# Patient Record
Sex: FEMALE | Race: WHITE | NOT HISPANIC OR LATINO | Employment: PART TIME | URBAN - METROPOLITAN AREA
[De-identification: names, ages, dates, MRNs, and addresses within clinical notes are randomized per-mention and may not be internally consistent; named-entity substitution may affect disease eponyms.]

---

## 2023-03-20 LAB
ALANINE AMINOTRANSFERASE (SGPT) (U/L) IN SER/PLAS: 26 U/L (ref 7–45)
ALBUMIN (G/DL) IN SER/PLAS: 4.4 G/DL (ref 3.4–5)
ALKALINE PHOSPHATASE (U/L) IN SER/PLAS: 91 U/L (ref 33–136)
ANION GAP IN SER/PLAS: 16 MMOL/L (ref 10–20)
ASPARTATE AMINOTRANSFERASE (SGOT) (U/L) IN SER/PLAS: 22 U/L (ref 9–39)
BILIRUBIN TOTAL (MG/DL) IN SER/PLAS: 0.5 MG/DL (ref 0–1.2)
CALCIUM (MG/DL) IN SER/PLAS: 9.7 MG/DL (ref 8.6–10.6)
CARBON DIOXIDE, TOTAL (MMOL/L) IN SER/PLAS: 26 MMOL/L (ref 21–32)
CHLORIDE (MMOL/L) IN SER/PLAS: 101 MMOL/L (ref 98–107)
CREATININE (MG/DL) IN SER/PLAS: 0.89 MG/DL (ref 0.5–1.05)
ERYTHROCYTE DISTRIBUTION WIDTH (RATIO) BY AUTOMATED COUNT: 12.9 % (ref 11.5–14.5)
ERYTHROCYTE MEAN CORPUSCULAR HEMOGLOBIN CONCENTRATION (G/DL) BY AUTOMATED: 31.6 G/DL (ref 32–36)
ERYTHROCYTE MEAN CORPUSCULAR VOLUME (FL) BY AUTOMATED COUNT: 92 FL (ref 80–100)
ERYTHROCYTES (10*6/UL) IN BLOOD BY AUTOMATED COUNT: 4.6 X10E12/L (ref 4–5.2)
GFR FEMALE: 66 ML/MIN/1.73M2
GLUCOSE (MG/DL) IN SER/PLAS: 100 MG/DL (ref 74–99)
HEMATOCRIT (%) IN BLOOD BY AUTOMATED COUNT: 42.1 % (ref 36–46)
HEMOGLOBIN (G/DL) IN BLOOD: 13.3 G/DL (ref 12–16)
LEUKOCYTES (10*3/UL) IN BLOOD BY AUTOMATED COUNT: 7.3 X10E9/L (ref 4.4–11.3)
NRBC (PER 100 WBCS) BY AUTOMATED COUNT: 0 /100 WBC (ref 0–0)
PLATELETS (10*3/UL) IN BLOOD AUTOMATED COUNT: 269 X10E9/L (ref 150–450)
POTASSIUM (MMOL/L) IN SER/PLAS: 4.5 MMOL/L (ref 3.5–5.3)
PROTEIN TOTAL: 7.3 G/DL (ref 6.4–8.2)
SODIUM (MMOL/L) IN SER/PLAS: 138 MMOL/L (ref 136–145)
THYROTROPIN (MIU/L) IN SER/PLAS BY DETECTION LIMIT <= 0.05 MIU/L: 1.95 MIU/L (ref 0.44–3.98)
UREA NITROGEN (MG/DL) IN SER/PLAS: 31 MG/DL (ref 6–23)

## 2023-06-05 LAB
CREATININE (MG/DL) IN SER/PLAS: 1.15 MG/DL (ref 0.5–1.05)
GFR FEMALE: 49 ML/MIN/1.73M2

## 2023-10-27 DIAGNOSIS — M79.7 FIBROMYALGIA: ICD-10-CM

## 2023-10-31 RX ORDER — DULOXETIN HYDROCHLORIDE 60 MG/1
60 CAPSULE, DELAYED RELEASE ORAL DAILY
Qty: 90 CAPSULE | Refills: 0 | Status: SHIPPED | OUTPATIENT
Start: 2023-10-31 | End: 2024-04-05 | Stop reason: SDUPTHER

## 2024-03-27 ENCOUNTER — OFFICE VISIT (OUTPATIENT)
Dept: PRIMARY CARE | Facility: CLINIC | Age: 79
End: 2024-03-27
Payer: MEDICARE

## 2024-03-27 VITALS
HEART RATE: 87 BPM | DIASTOLIC BLOOD PRESSURE: 70 MMHG | BODY MASS INDEX: 40.65 KG/M2 | SYSTOLIC BLOOD PRESSURE: 140 MMHG | HEIGHT: 65 IN | WEIGHT: 244 LBS

## 2024-03-27 DIAGNOSIS — I87.2 STASIS DERMATITIS OF BOTH LEGS: ICD-10-CM

## 2024-03-27 DIAGNOSIS — R01.1 MURMUR: ICD-10-CM

## 2024-03-27 DIAGNOSIS — E03.9 ACQUIRED HYPOTHYROIDISM: ICD-10-CM

## 2024-03-27 DIAGNOSIS — Z00.00 MEDICARE ANNUAL WELLNESS VISIT, SUBSEQUENT: Primary | ICD-10-CM

## 2024-03-27 DIAGNOSIS — M79.7 FIBROMYALGIA: ICD-10-CM

## 2024-03-27 DIAGNOSIS — I89.0 LYMPHEDEMA: ICD-10-CM

## 2024-03-27 DIAGNOSIS — Z78.0 ASYMPTOMATIC POSTMENOPAUSAL STATE: ICD-10-CM

## 2024-03-27 DIAGNOSIS — R79.9 ABNORMAL FINDING OF BLOOD CHEMISTRY, UNSPECIFIED: ICD-10-CM

## 2024-03-27 DIAGNOSIS — Z11.59 NEED FOR HEPATITIS C SCREENING TEST: ICD-10-CM

## 2024-03-27 DIAGNOSIS — I10 BENIGN ESSENTIAL HTN: ICD-10-CM

## 2024-03-27 DIAGNOSIS — E55.9 VITAMIN D DEFICIENCY: ICD-10-CM

## 2024-03-27 DIAGNOSIS — E78.49 OTHER HYPERLIPIDEMIA: ICD-10-CM

## 2024-03-27 DIAGNOSIS — E66.01 MORBID OBESITY (MULTI): ICD-10-CM

## 2024-03-27 PROBLEM — R69 DISORDER: Status: RESOLVED | Noted: 2024-03-27 | Resolved: 2024-03-27

## 2024-03-27 PROBLEM — L98.9 SKIN LESION: Status: ACTIVE | Noted: 2024-03-27

## 2024-03-27 PROBLEM — I50.89 OTHER HEART FAILURE (MULTI): Status: ACTIVE | Noted: 2024-03-27

## 2024-03-27 PROBLEM — H60.509 ACUTE OTITIS EXTERNA: Status: RESOLVED | Noted: 2024-03-27 | Resolved: 2024-03-27

## 2024-03-27 PROBLEM — N64.4 BREAST PAIN: Status: RESOLVED | Noted: 2024-03-27 | Resolved: 2024-03-27

## 2024-03-27 PROBLEM — H04.123 DRY EYE SYNDROME OF BOTH EYES: Status: ACTIVE | Noted: 2023-06-27

## 2024-03-27 PROBLEM — R05.9 COUGH: Status: RESOLVED | Noted: 2024-03-27 | Resolved: 2024-03-27

## 2024-03-27 PROBLEM — B02.9 HERPES ZOSTER: Status: ACTIVE | Noted: 2024-03-27

## 2024-03-27 PROBLEM — R73.03 PREDIABETES: Status: ACTIVE | Noted: 2024-03-27

## 2024-03-27 PROBLEM — R06.02 SOB (SHORTNESS OF BREATH) ON EXERTION: Status: ACTIVE | Noted: 2024-03-27

## 2024-03-27 PROBLEM — M79.621 PAIN IN RIGHT AXILLA: Status: RESOLVED | Noted: 2024-03-27 | Resolved: 2024-03-27

## 2024-03-27 PROBLEM — T17.308A CHOKING: Status: RESOLVED | Noted: 2024-03-27 | Resolved: 2024-03-27

## 2024-03-27 PROBLEM — H16.211 EXPOSURE KERATOCONJUNCTIVITIS OF RIGHT EYE: Status: RESOLVED | Noted: 2023-06-27 | Resolved: 2024-03-27

## 2024-03-27 PROBLEM — E78.5 HYPERLIPIDEMIA: Status: ACTIVE | Noted: 2024-03-27

## 2024-03-27 PROBLEM — H35.30 AMD (AGE RELATED MACULAR DEGENERATION): Status: ACTIVE | Noted: 2023-06-27

## 2024-03-27 PROBLEM — U07.1 COVID-19: Status: RESOLVED | Noted: 2024-03-27 | Resolved: 2024-03-27

## 2024-03-27 PROBLEM — H35.373 EPIRETINAL MEMBRANE, BOTH EYES: Status: ACTIVE | Noted: 2023-06-27

## 2024-03-27 PROBLEM — E66.9 OBESITY, CLASS II, BMI 35-39.9: Status: ACTIVE | Noted: 2019-10-09

## 2024-03-27 PROBLEM — R60.9 EDEMA: Status: ACTIVE | Noted: 2024-03-27

## 2024-03-27 PROBLEM — L03.90 CELLULITIS: Status: RESOLVED | Noted: 2024-03-27 | Resolved: 2024-03-27

## 2024-03-27 PROBLEM — J81.0: Status: RESOLVED | Noted: 2024-03-27 | Resolved: 2024-03-27

## 2024-03-27 PROBLEM — I50.89 OTHER HEART FAILURE (MULTI): Status: RESOLVED | Noted: 2024-03-27 | Resolved: 2024-03-27

## 2024-03-27 PROBLEM — J45.909 ASTHMATIC BRONCHITIS (HHS-HCC): Status: ACTIVE | Noted: 2024-03-27

## 2024-03-27 PROBLEM — G47.00 INSOMNIA: Status: ACTIVE | Noted: 2024-03-27

## 2024-03-27 PROBLEM — M19.90 OSTEOARTHRITIS: Status: ACTIVE | Noted: 2024-03-27

## 2024-03-27 PROBLEM — L72.3 SEBACEOUS CYST: Status: ACTIVE | Noted: 2018-07-16

## 2024-03-27 PROBLEM — T84.018A FAILED TOTAL KNEE ARTHROPLASTY (CMS-HCC): Status: ACTIVE | Noted: 2019-10-08

## 2024-03-27 PROBLEM — E88.810 DYSMETABOLIC SYNDROME: Status: ACTIVE | Noted: 2024-03-27

## 2024-03-27 PROBLEM — R10.9 ABDOMINAL PAIN: Status: RESOLVED | Noted: 2023-06-02 | Resolved: 2024-03-27

## 2024-03-27 PROBLEM — D17.21 BENIGN LIPOMATOUS NEOPLASM OF SKIN AND SUBCUTANEOUS TISSUE OF RIGHT ARM: Status: ACTIVE | Noted: 2018-07-16

## 2024-03-27 PROBLEM — R07.89 ATYPICAL CHEST PAIN: Status: RESOLVED | Noted: 2022-11-04 | Resolved: 2024-03-27

## 2024-03-27 PROBLEM — R63.5 ABNORMAL WEIGHT GAIN: Status: ACTIVE | Noted: 2024-03-27

## 2024-03-27 PROBLEM — Z86.16 PERSONAL HISTORY OF COVID-19: Status: RESOLVED | Noted: 2022-06-15 | Resolved: 2024-03-27

## 2024-03-27 PROBLEM — Z96.659 FAILED TOTAL KNEE ARTHROPLASTY (CMS-HCC): Status: ACTIVE | Noted: 2019-10-08

## 2024-03-27 PROBLEM — Z96.1 PSEUDOPHAKIA OF BOTH EYES: Status: ACTIVE | Noted: 2023-06-27

## 2024-03-27 PROBLEM — E66.812 OBESITY, CLASS II, BMI 35-39.9: Status: ACTIVE | Noted: 2019-10-09

## 2024-03-27 PROBLEM — D47.2 MONOCLONAL GAMMOPATHY: Status: ACTIVE | Noted: 2024-03-27

## 2024-03-27 PROBLEM — H52.203 ASTIGMATISM OF BOTH EYES: Status: ACTIVE | Noted: 2023-06-27

## 2024-03-27 PROBLEM — R93.89 ABNORMAL CHEST XRAY: Status: ACTIVE | Noted: 2024-03-27

## 2024-03-27 PROBLEM — J81.0: Status: ACTIVE | Noted: 2024-03-27

## 2024-03-27 PROBLEM — L97.309 ULCER OF ANKLE (MULTI): Status: RESOLVED | Noted: 2024-03-27 | Resolved: 2024-03-27

## 2024-03-27 PROCEDURE — 1170F FXNL STATUS ASSESSED: CPT | Performed by: INTERNAL MEDICINE

## 2024-03-27 PROCEDURE — 3078F DIAST BP <80 MM HG: CPT | Performed by: INTERNAL MEDICINE

## 2024-03-27 PROCEDURE — 3077F SYST BP >= 140 MM HG: CPT | Performed by: INTERNAL MEDICINE

## 2024-03-27 PROCEDURE — 1160F RVW MEDS BY RX/DR IN RCRD: CPT | Performed by: INTERNAL MEDICINE

## 2024-03-27 PROCEDURE — 99215 OFFICE O/P EST HI 40 MIN: CPT | Performed by: INTERNAL MEDICINE

## 2024-03-27 PROCEDURE — G0439 PPPS, SUBSEQ VISIT: HCPCS | Performed by: INTERNAL MEDICINE

## 2024-03-27 PROCEDURE — 1036F TOBACCO NON-USER: CPT | Performed by: INTERNAL MEDICINE

## 2024-03-27 PROCEDURE — 1124F ACP DISCUSS-NO DSCNMKR DOCD: CPT | Performed by: INTERNAL MEDICINE

## 2024-03-27 PROCEDURE — 1159F MED LIST DOCD IN RCRD: CPT | Performed by: INTERNAL MEDICINE

## 2024-03-27 RX ORDER — CALCITRIOL 0.25 UG/1
0.25 CAPSULE ORAL
COMMUNITY

## 2024-03-27 RX ORDER — TRIAMTERENE AND HYDROCHLOROTHIAZIDE 37.5; 25 MG/1; MG/1
1 CAPSULE ORAL DAILY PRN
COMMUNITY

## 2024-03-27 RX ORDER — ATORVASTATIN CALCIUM 20 MG/1
20 TABLET, FILM COATED ORAL DAILY
COMMUNITY
End: 2024-04-05 | Stop reason: SDUPTHER

## 2024-03-27 RX ORDER — TRIAMCINOLONE ACETONIDE 1 MG/G
CREAM TOPICAL 2 TIMES DAILY
Qty: 60 G | Refills: 1 | Status: SHIPPED | OUTPATIENT
Start: 2024-03-27

## 2024-03-27 RX ORDER — LEVOTHYROXINE SODIUM 25 UG/1
25 TABLET ORAL DAILY
COMMUNITY
End: 2024-06-03

## 2024-03-27 RX ORDER — CHOLECALCIFEROL (VITAMIN D3) 50 MCG
2000 TABLET ORAL
COMMUNITY

## 2024-03-27 RX ORDER — MULTIVIT-MIN/IRON/FOLIC ACID/K 18-600-40
CAPSULE ORAL
COMMUNITY

## 2024-03-27 RX ORDER — ASPIRIN 81 MG/1
81 TABLET ORAL 2 TIMES DAILY
COMMUNITY
Start: 2019-10-15

## 2024-03-27 RX ORDER — MULTIVITAMIN
1 TABLET ORAL DAILY
COMMUNITY

## 2024-03-27 RX ORDER — ACETAMINOPHEN AND PHENYLEPHRINE HCL 325; 5 MG/1; MG/1
TABLET ORAL
COMMUNITY

## 2024-03-27 ASSESSMENT — ENCOUNTER SYMPTOMS
DEPRESSION: 0
OCCASIONAL FEELINGS OF UNSTEADINESS: 0
LOSS OF SENSATION IN FEET: 0

## 2024-03-27 ASSESSMENT — ACTIVITIES OF DAILY LIVING (ADL)
DRESSING: INDEPENDENT
DOING_HOUSEWORK: INDEPENDENT
MANAGING_FINANCES: INDEPENDENT
GROCERY_SHOPPING: INDEPENDENT
BATHING: INDEPENDENT
TAKING_MEDICATION: INDEPENDENT

## 2024-03-27 NOTE — PROGRESS NOTES
"Subjective   Reason for Visit: Millie Riley is an 78 y.o. female here for a Medicare Wellness visit.     Past Medical, Surgical, and Family History reviewed and updated in chart.    Reviewed all medications by prescribing practitioner or clinical pharmacist (such as prescriptions, OTCs, herbal therapies and supplements) and documented in the medical record.    HPI  Millie is a 79YO  female who comes to establish primary care since her previous PCP will be relocating his practice.  Patient is due for subsequent annual Medicare wellness exam and lab work.  She refuses immunizations.  Patient is agreeable to getting a bone DEXA which will be ordered.  She is compliant with her medications and reports no side effects.  Patient has lost her son who  few weeks ago at the age of 54 due to an MI.  She has been under a lot of stress as a result but claims she is trying to cope with the situation along with her daughter-in-law/family.  Patient denies fever, chills, chest pain, shortness of breath, cough, dizziness, palpitations, syncope, abdominal pain, nausea, vomiting, diarrhea, melena, rectal bleeding, loss of weight/appetite, dysuria, hematuria, headaches, dizziness, weakness or numbness.  Patient Care Team:  Azalia Way MD as PCP - General (Internal Medicine)     Review of Systems  As per [facility]  Objective   Vitals:  /70 (BP Location: Right arm, Patient Position: Lying, BP Cuff Size: Large adult)   Pulse 87   Ht 1.651 m (5' 5\")   Wt 111 kg (244 lb)   BMI 40.60 kg/m²       Physical Exam  General - well developed, well appearing, morbidly obese, elderly  female in no acute respiratory distress  Eyes - normal sclera and conjunctiva with no pallor or icterus, normal extraocular movements  ENT - normal external auditory canals and tympanic membranes, throat clear with no exudates  Neck - No JVD, thyromegaly or lymphadenopathy  Lungs - no respiratory distress and lungs clear to " auscultation bilaterally  Heart - normal S1, S2 with normal heart rate, rhythm and systolic murmur heard in the left sternal border  Abdomen - soft, nontender with no masses or organomegaly  Extremities - no cyanosis or pedal edema  Neuro - grossly normal neuro exam with no focal neuro deficits  Psych - normal mental status, mood and affect   Skin -erythematous and scaly skin noted on bilateral shins  MSK - normal gait with grossly normal ROM of major joints  Assessment/Plan   1.  Subsequent annual Medicare wellness exam-routine labs and bone DEXA ordered, patient declines vaccinations  2.  Hypertension-BP is fair, slight elevation in systolic blood pressure could be due to patient being under stress from losing her son recently  3.  Hypothyroidism-patient is on levothyroxine, TSH has been ordered  4.  Hyperlipidemia-fasting lipids ordered, patient is on statin therapy  5.  Need for hepatitis C screening-hepatitis C antibody has been ordered  6.  Asymptomatic postmenopausal state-bone DEXA has been ordered  7.  Chronic lymphedema-patient is on diuretic therapy  8.  Stasis dermatitis of bilateral legs-topical triamcinolone cream has been prescribed and I have advised patient to use it only for 2 weeks at a time  9.  History of vitamin D deficiency-vitamin D 25-hydroxy level has been ordered  10.  Morbid obesity-labs will be checked, patient has been advised to increase activity as tolerated and watch diet to aid in weight loss  11.  Fibromyalgia-patient is on duloxetine  12.  Cardiac murmur, chronic-echo done in 2022 revealed mild aortic stenosis, monitor  Follow-up in 6 months.  43 minutes spent rooming the patient, reviewing records, eliciting history, examining patient, counseling, coordination of care and in documentation.  This note was partially generated using the Dragon voice recognition system. There may be some incorrect words, spelling and punctuation errors that were not corrected prior to committing the  note to the patient's medical record.  Problem List Items Addressed This Visit       Benign essential HTN    Hyperlipidemia    Relevant Orders    Lipid Panel    Hypothyroidism    Overview     Last Assessment & Plan: Formatting of this note might be different from the original. Assessment: minor thyroid         Relevant Orders    TSH with reflex to Free T4 if abnormal    Lymphedema     Other Visit Diagnoses       Medicare annual wellness visit, subsequent    -  Primary    Relevant Orders    Comprehensive Metabolic Panel    Urinalysis with Reflex Microscopic    Need for hepatitis C screening test        Relevant Orders    Hepatitis C Antibody    Morbid obesity (CMS/HCC)        Relevant Orders    Hemoglobin A1C    Vitamin D deficiency        Relevant Orders    Vitamin D 25-Hydroxy,Total (for eval of Vitamin D levels)    Abnormal finding of blood chemistry, unspecified        Relevant Orders    Hemoglobin A1C

## 2024-04-02 ENCOUNTER — TELEPHONE (OUTPATIENT)
Dept: PRIMARY CARE | Facility: CLINIC | Age: 79
End: 2024-04-02
Payer: MEDICARE

## 2024-04-02 NOTE — TELEPHONE ENCOUNTER
Patient called for a refill on    Duloxetine 60 mg capsules #90 take one capsule daily    Atorvastatin 20 mg tablets #90 take one tablet daily     [facility] [phone]

## 2024-04-03 ENCOUNTER — LAB (OUTPATIENT)
Dept: LAB | Facility: LAB | Age: 79
End: 2024-04-03
Payer: MEDICARE

## 2024-04-03 DIAGNOSIS — E66.01 MORBID OBESITY (MULTI): ICD-10-CM

## 2024-04-03 DIAGNOSIS — Z00.00 MEDICARE ANNUAL WELLNESS VISIT, SUBSEQUENT: ICD-10-CM

## 2024-04-03 DIAGNOSIS — E78.49 OTHER HYPERLIPIDEMIA: ICD-10-CM

## 2024-04-03 DIAGNOSIS — E03.9 ACQUIRED HYPOTHYROIDISM: ICD-10-CM

## 2024-04-03 DIAGNOSIS — E55.9 VITAMIN D DEFICIENCY: ICD-10-CM

## 2024-04-03 DIAGNOSIS — R79.9 ABNORMAL FINDING OF BLOOD CHEMISTRY, UNSPECIFIED: ICD-10-CM

## 2024-04-03 DIAGNOSIS — Z11.59 NEED FOR HEPATITIS C SCREENING TEST: ICD-10-CM

## 2024-04-03 LAB
25(OH)D3 SERPL-MCNC: 42 NG/ML (ref 30–100)
ALBUMIN SERPL BCP-MCNC: 4.1 G/DL (ref 3.4–5)
ALP SERPL-CCNC: 96 U/L (ref 33–136)
ALT SERPL W P-5'-P-CCNC: 23 U/L (ref 7–45)
ANION GAP SERPL CALC-SCNC: 14 MMOL/L (ref 10–20)
APPEARANCE UR: ABNORMAL
AST SERPL W P-5'-P-CCNC: 20 U/L (ref 9–39)
BILIRUB SERPL-MCNC: 0.4 MG/DL (ref 0–1.2)
BILIRUB UR STRIP.AUTO-MCNC: NEGATIVE MG/DL
BUN SERPL-MCNC: 24 MG/DL (ref 6–23)
CALCIUM SERPL-MCNC: 9 MG/DL (ref 8.6–10.6)
CHLORIDE SERPL-SCNC: 106 MMOL/L (ref 98–107)
CHOLEST SERPL-MCNC: 169 MG/DL (ref 0–199)
CHOLESTEROL/HDL RATIO: 2.7
CO2 SERPL-SCNC: 28 MMOL/L (ref 21–32)
COLOR UR: ABNORMAL
CREAT SERPL-MCNC: 0.9 MG/DL (ref 0.5–1.05)
EGFRCR SERPLBLD CKD-EPI 2021: 66 ML/MIN/1.73M*2
EST. AVERAGE GLUCOSE BLD GHB EST-MCNC: 114 MG/DL
GLUCOSE SERPL-MCNC: 102 MG/DL (ref 74–99)
GLUCOSE UR STRIP.AUTO-MCNC: NORMAL MG/DL
HBA1C MFR BLD: 5.6 %
HCV AB SER QL: NONREACTIVE
HDLC SERPL-MCNC: 62.7 MG/DL
KETONES UR STRIP.AUTO-MCNC: NEGATIVE MG/DL
LDLC SERPL CALC-MCNC: 87 MG/DL
LEUKOCYTE ESTERASE UR QL STRIP.AUTO: ABNORMAL
NITRITE UR QL STRIP.AUTO: NEGATIVE
NON HDL CHOLESTEROL: 106 MG/DL (ref 0–149)
PH UR STRIP.AUTO: 6 [PH]
POTASSIUM SERPL-SCNC: 4.3 MMOL/L (ref 3.5–5.3)
PROT SERPL-MCNC: 6.9 G/DL (ref 6.4–8.2)
PROT UR STRIP.AUTO-MCNC: ABNORMAL MG/DL
RBC # UR STRIP.AUTO: ABNORMAL /UL
RBC #/AREA URNS AUTO: >20 /HPF
SODIUM SERPL-SCNC: 144 MMOL/L (ref 136–145)
SP GR UR STRIP.AUTO: 1.02
SQUAMOUS #/AREA URNS AUTO: ABNORMAL /HPF
TRIGL SERPL-MCNC: 97 MG/DL (ref 0–149)
TSH SERPL-ACNC: 1.91 MIU/L (ref 0.44–3.98)
UROBILINOGEN UR STRIP.AUTO-MCNC: NORMAL MG/DL
VLDL: 19 MG/DL (ref 0–40)
WBC #/AREA URNS AUTO: >50 /HPF

## 2024-04-03 PROCEDURE — 36415 COLL VENOUS BLD VENIPUNCTURE: CPT

## 2024-04-03 PROCEDURE — 80053 COMPREHEN METABOLIC PANEL: CPT

## 2024-04-03 PROCEDURE — 80061 LIPID PANEL: CPT

## 2024-04-03 PROCEDURE — 82306 VITAMIN D 25 HYDROXY: CPT

## 2024-04-03 PROCEDURE — 84443 ASSAY THYROID STIM HORMONE: CPT

## 2024-04-03 PROCEDURE — 83036 HEMOGLOBIN GLYCOSYLATED A1C: CPT

## 2024-04-03 PROCEDURE — 81001 URINALYSIS AUTO W/SCOPE: CPT

## 2024-04-03 PROCEDURE — 86803 HEPATITIS C AB TEST: CPT

## 2024-04-05 DIAGNOSIS — E78.49 OTHER HYPERLIPIDEMIA: Primary | ICD-10-CM

## 2024-04-05 DIAGNOSIS — M79.7 FIBROMYALGIA: ICD-10-CM

## 2024-04-05 RX ORDER — DULOXETIN HYDROCHLORIDE 60 MG/1
60 CAPSULE, DELAYED RELEASE ORAL DAILY
Qty: 90 CAPSULE | Refills: 1 | Status: SHIPPED | OUTPATIENT
Start: 2024-04-05

## 2024-04-05 RX ORDER — ATORVASTATIN CALCIUM 20 MG/1
20 TABLET, FILM COATED ORAL DAILY
Qty: 90 TABLET | Refills: 1 | Status: SHIPPED | OUTPATIENT
Start: 2024-04-05 | End: 2024-10-02

## 2024-05-20 ENCOUNTER — APPOINTMENT (OUTPATIENT)
Dept: DERMATOLOGY | Facility: CLINIC | Age: 79
End: 2024-05-20
Payer: MEDICARE

## 2024-06-03 DIAGNOSIS — E03.9 ACQUIRED HYPOTHYROIDISM: Primary | ICD-10-CM

## 2024-06-03 RX ORDER — LEVOTHYROXINE SODIUM 25 UG/1
25 TABLET ORAL DAILY
Qty: 90 TABLET | Refills: 3 | Status: SHIPPED | OUTPATIENT
Start: 2024-06-03

## 2024-08-23 DIAGNOSIS — M79.7 FIBROMYALGIA: ICD-10-CM

## 2024-08-23 DIAGNOSIS — E78.49 OTHER HYPERLIPIDEMIA: ICD-10-CM

## 2024-08-23 RX ORDER — DULOXETIN HYDROCHLORIDE 60 MG/1
60 CAPSULE, DELAYED RELEASE ORAL DAILY
Qty: 90 CAPSULE | Refills: 1 | Status: SHIPPED | OUTPATIENT
Start: 2024-08-23

## 2024-08-23 RX ORDER — ATORVASTATIN CALCIUM 20 MG/1
20 TABLET, FILM COATED ORAL DAILY
Qty: 90 TABLET | Refills: 1 | Status: SHIPPED | OUTPATIENT
Start: 2024-08-23

## 2024-09-10 DIAGNOSIS — I10 BENIGN ESSENTIAL HTN: Primary | ICD-10-CM

## 2024-09-10 RX ORDER — TRIAMTERENE AND HYDROCHLOROTHIAZIDE 37.5; 25 MG/1; MG/1
1 CAPSULE ORAL DAILY
Qty: 90 CAPSULE | Refills: 0 | Status: SHIPPED | OUTPATIENT
Start: 2024-09-10

## 2024-10-03 ENCOUNTER — TELEMEDICINE (OUTPATIENT)
Dept: PRIMARY CARE | Facility: CLINIC | Age: 79
End: 2024-10-03
Payer: MEDICARE

## 2024-10-03 ENCOUNTER — TELEPHONE (OUTPATIENT)
Dept: PRIMARY CARE | Facility: CLINIC | Age: 79
End: 2024-10-03

## 2024-10-03 DIAGNOSIS — H66.90 ACUTE OTITIS MEDIA, UNSPECIFIED OTITIS MEDIA TYPE: Primary | ICD-10-CM

## 2024-10-03 RX ORDER — AMOXICILLIN AND CLAVULANATE POTASSIUM 875; 125 MG/1; MG/1
875 TABLET, FILM COATED ORAL 2 TIMES DAILY
Qty: 20 TABLET | Refills: 0 | Status: SHIPPED | OUTPATIENT
Start: 2024-10-03 | End: 2024-10-13

## 2024-10-03 NOTE — PROGRESS NOTES
Subjective   Patient ID: Millie Riley is a 79 y.o. female who presents for Earache.    HPI   Millie is a 79 year-old female called via a regular telephone call for a virtual/acute visit.  Patient complains of sore throat with bilateral ear discomfort for the past few days and she also has some nasal congestion though she denies any discharge from ears, fever, chills, chest pain, shortness of breath or cough.  No sick contacts reported.  Patient claims that she gets ear infections once a year around the same time.  Review of Systems  As per HPI.  Objective   There were no vitals taken for this visit.    Physical Exam  Patient sounds comfortable on the  phone and does not appear to be in any acute respiratory distress  Assessment/Plan        Acute otitis media, pharyngitis-Augmentin 875 mg twice daily for 10 days will be prescribed  Follow-up as advised during previous appointment.  This note was partially generated using the Dragon voice recognition system. There may be some incorrect words, spelling and punctuation errors that were not corrected prior to committing the note to the patient's medical record.

## 2024-10-03 NOTE — TELEPHONE ENCOUNTER
The patient called and stated she has a sore throat and thinks her ears are infected.  No fever.    She wanted to know if she could see you or do a virtual visit.  She gets this around this time every year as the weather changes.    [phone]

## 2024-12-06 DIAGNOSIS — H65.197 OTHER RECURRENT ACUTE NONSUPPURATIVE OTITIS MEDIA, UNSPECIFIED LATERALITY: Primary | ICD-10-CM

## 2024-12-06 RX ORDER — AMOXICILLIN AND CLAVULANATE POTASSIUM 875; 125 MG/1; MG/1
875 TABLET, FILM COATED ORAL 2 TIMES DAILY
Qty: 20 TABLET | Refills: 0 | Status: SHIPPED | OUTPATIENT
Start: 2024-12-06 | End: 2024-12-16

## 2024-12-07 DIAGNOSIS — I10 BENIGN ESSENTIAL HTN: ICD-10-CM

## 2024-12-09 RX ORDER — TRIAMTERENE AND HYDROCHLOROTHIAZIDE 37.5; 25 MG/1; MG/1
1 CAPSULE ORAL DAILY
Qty: 90 CAPSULE | Refills: 1 | Status: SHIPPED | OUTPATIENT
Start: 2024-12-09

## 2024-12-10 ENCOUNTER — TELEPHONE (OUTPATIENT)
Dept: PRIMARY CARE | Facility: CLINIC | Age: 79
End: 2024-12-10
Payer: MEDICARE

## 2024-12-10 NOTE — TELEPHONE ENCOUNTER
"Pt called today complaining of SOB and left leg pain/swelling. I spoke with Dr. Way and she advised me to tell pt to please go to ER. I gave verbal orders over the phone multiple times stating that Dr. Way herself advised this. Pt scheduled with Dr. Way in mid January as a \"ED follow up\" but said she would try to get one of her daughters to take her to ER today.   "

## 2024-12-31 ENCOUNTER — HOSPITAL ENCOUNTER (INPATIENT)
Facility: HOSPITAL | Age: 79
LOS: 2 days | Discharge: HOME | End: 2025-01-02
Attending: INTERNAL MEDICINE | Admitting: INTERNAL MEDICINE
Payer: MEDICARE

## 2024-12-31 DIAGNOSIS — I50.9 ACUTE CONGESTIVE HEART FAILURE, UNSPECIFIED HEART FAILURE TYPE: ICD-10-CM

## 2024-12-31 DIAGNOSIS — R94.31 ABNORMAL EKG: ICD-10-CM

## 2024-12-31 DIAGNOSIS — I48.91 ATRIAL FIBRILLATION (MULTI): Primary | ICD-10-CM

## 2024-12-31 DIAGNOSIS — I48.91 NEW ONSET ATRIAL FIBRILLATION (MULTI): ICD-10-CM

## 2024-12-31 DIAGNOSIS — I35.0 NONRHEUMATIC AORTIC (VALVE) STENOSIS: ICD-10-CM

## 2024-12-31 DIAGNOSIS — R06.09 DYSPNEA ON EXERTION: ICD-10-CM

## 2024-12-31 PROCEDURE — 1100000001 HC PRIVATE ROOM DAILY

## 2024-12-31 PROCEDURE — 99223 1ST HOSP IP/OBS HIGH 75: CPT

## 2024-12-31 SDOH — SOCIAL STABILITY: SOCIAL INSECURITY
WITHIN THE LAST YEAR, HAVE YOU BEEN RAPED OR FORCED TO HAVE ANY KIND OF SEXUAL ACTIVITY BY YOUR PARTNER OR EX-PARTNER?: NO

## 2024-12-31 SDOH — SOCIAL STABILITY: SOCIAL INSECURITY
WITHIN THE LAST YEAR, HAVE YOU BEEN KICKED, HIT, SLAPPED, OR OTHERWISE PHYSICALLY HURT BY YOUR PARTNER OR EX-PARTNER?: NO

## 2024-12-31 SDOH — ECONOMIC STABILITY: FOOD INSECURITY: WITHIN THE PAST 12 MONTHS, THE FOOD YOU BOUGHT JUST DIDN'T LAST AND YOU DIDN'T HAVE MONEY TO GET MORE.: NEVER TRUE

## 2024-12-31 SDOH — SOCIAL STABILITY: SOCIAL INSECURITY: HAVE YOU HAD THOUGHTS OF HARMING ANYONE ELSE?: NO

## 2024-12-31 SDOH — HEALTH STABILITY: MENTAL HEALTH: HOW OFTEN DO YOU HAVE A DRINK CONTAINING ALCOHOL?: NEVER

## 2024-12-31 SDOH — HEALTH STABILITY: MENTAL HEALTH
DO YOU FEEL STRESS - TENSE, RESTLESS, NERVOUS, OR ANXIOUS, OR UNABLE TO SLEEP AT NIGHT BECAUSE YOUR MIND IS TROUBLED ALL THE TIME - THESE DAYS?: NOT AT ALL

## 2024-12-31 SDOH — SOCIAL STABILITY: SOCIAL INSECURITY: WITHIN THE LAST YEAR, HAVE YOU BEEN AFRAID OF YOUR PARTNER OR EX-PARTNER?: NO

## 2024-12-31 SDOH — SOCIAL STABILITY: SOCIAL INSECURITY: DOES ANYONE TRY TO KEEP YOU FROM HAVING/CONTACTING OTHER FRIENDS OR DOING THINGS OUTSIDE YOUR HOME?: NO

## 2024-12-31 SDOH — SOCIAL STABILITY: SOCIAL INSECURITY: HAS ANYONE EVER THREATENED TO HURT YOUR FAMILY OR YOUR PETS?: NO

## 2024-12-31 SDOH — SOCIAL STABILITY: SOCIAL INSECURITY: ARE YOU OR HAVE YOU BEEN THREATENED OR ABUSED PHYSICALLY, EMOTIONALLY, OR SEXUALLY BY ANYONE?: NO

## 2024-12-31 SDOH — SOCIAL STABILITY: SOCIAL INSECURITY: DO YOU FEEL ANYONE HAS EXPLOITED OR TAKEN ADVANTAGE OF YOU FINANCIALLY OR OF YOUR PERSONAL PROPERTY?: NO

## 2024-12-31 SDOH — HEALTH STABILITY: MENTAL HEALTH: HOW OFTEN DO YOU HAVE SIX OR MORE DRINKS ON ONE OCCASION?: NEVER

## 2024-12-31 SDOH — ECONOMIC STABILITY: INCOME INSECURITY: IN THE PAST 12 MONTHS HAS THE ELECTRIC, GAS, OIL, OR WATER COMPANY THREATENED TO SHUT OFF SERVICES IN YOUR HOME?: NO

## 2024-12-31 SDOH — HEALTH STABILITY: MENTAL HEALTH: HOW MANY DRINKS CONTAINING ALCOHOL DO YOU HAVE ON A TYPICAL DAY WHEN YOU ARE DRINKING?: PATIENT DOES NOT DRINK

## 2024-12-31 SDOH — SOCIAL STABILITY: SOCIAL INSECURITY: ARE THERE ANY APPARENT SIGNS OF INJURIES/BEHAVIORS THAT COULD BE RELATED TO ABUSE/NEGLECT?: NO

## 2024-12-31 SDOH — ECONOMIC STABILITY: HOUSING INSECURITY: IN THE LAST 12 MONTHS, WAS THERE A TIME WHEN YOU WERE NOT ABLE TO PAY THE MORTGAGE OR RENT ON TIME?: NO

## 2024-12-31 SDOH — ECONOMIC STABILITY: HOUSING INSECURITY: AT ANY TIME IN THE PAST 12 MONTHS, WERE YOU HOMELESS OR LIVING IN A SHELTER (INCLUDING NOW)?: NO

## 2024-12-31 SDOH — ECONOMIC STABILITY: FOOD INSECURITY: HOW HARD IS IT FOR YOU TO PAY FOR THE VERY BASICS LIKE FOOD, HOUSING, MEDICAL CARE, AND HEATING?: NOT VERY HARD

## 2024-12-31 SDOH — ECONOMIC STABILITY: FOOD INSECURITY: WITHIN THE PAST 12 MONTHS, YOU WORRIED THAT YOUR FOOD WOULD RUN OUT BEFORE YOU GOT THE MONEY TO BUY MORE.: NEVER TRUE

## 2024-12-31 SDOH — SOCIAL STABILITY: SOCIAL INSECURITY: WITHIN THE LAST YEAR, HAVE YOU BEEN HUMILIATED OR EMOTIONALLY ABUSED IN OTHER WAYS BY YOUR PARTNER OR EX-PARTNER?: NO

## 2024-12-31 SDOH — SOCIAL STABILITY: SOCIAL INSECURITY: DO YOU FEEL UNSAFE GOING BACK TO THE PLACE WHERE YOU ARE LIVING?: NO

## 2024-12-31 SDOH — SOCIAL STABILITY: SOCIAL INSECURITY: WERE YOU ABLE TO COMPLETE ALL THE BEHAVIORAL HEALTH SCREENINGS?: YES

## 2024-12-31 SDOH — ECONOMIC STABILITY: TRANSPORTATION INSECURITY: IN THE PAST 12 MONTHS, HAS LACK OF TRANSPORTATION KEPT YOU FROM MEDICAL APPOINTMENTS OR FROM GETTING MEDICATIONS?: NO

## 2024-12-31 SDOH — SOCIAL STABILITY: SOCIAL INSECURITY: HAVE YOU HAD ANY THOUGHTS OF HARMING ANYONE ELSE?: NO

## 2024-12-31 SDOH — SOCIAL STABILITY: SOCIAL INSECURITY: ABUSE: ADULT

## 2024-12-31 SDOH — ECONOMIC STABILITY: HOUSING INSECURITY: IN THE PAST 12 MONTHS, HOW MANY TIMES HAVE YOU MOVED WHERE YOU WERE LIVING?: 0

## 2024-12-31 ASSESSMENT — ACTIVITIES OF DAILY LIVING (ADL)
ADEQUATE_TO_COMPLETE_ADL: YES
JUDGMENT_ADEQUATE_SAFELY_COMPLETE_DAILY_ACTIVITIES: YES
BATHING: INDEPENDENT
TOILETING: INDEPENDENT
GROOMING: INDEPENDENT
FEEDING YOURSELF: INDEPENDENT
LACK_OF_TRANSPORTATION: NO
DRESSING YOURSELF: INDEPENDENT
ASSISTIVE_DEVICE: EYEGLASSES
PATIENT'S MEMORY ADEQUATE TO SAFELY COMPLETE DAILY ACTIVITIES?: YES
HEARING - RIGHT EAR: FUNCTIONAL
WALKS IN HOME: INDEPENDENT
LACK_OF_TRANSPORTATION: NO
HEARING - LEFT EAR: FUNCTIONAL

## 2024-12-31 ASSESSMENT — LIFESTYLE VARIABLES
AUDIT-C TOTAL SCORE: 0
SKIP TO QUESTIONS 9-10: 1
HOW MANY STANDARD DRINKS CONTAINING ALCOHOL DO YOU HAVE ON A TYPICAL DAY: PATIENT DOES NOT DRINK
HOW OFTEN DO YOU HAVE 6 OR MORE DRINKS ON ONE OCCASION: NEVER
SKIP TO QUESTIONS 9-10: 1
HOW OFTEN DO YOU HAVE A DRINK CONTAINING ALCOHOL: NEVER

## 2024-12-31 ASSESSMENT — COGNITIVE AND FUNCTIONAL STATUS - GENERAL
DAILY ACTIVITIY SCORE: 24
PATIENT BASELINE BEDBOUND: NO
MOBILITY SCORE: 24

## 2024-12-31 ASSESSMENT — PATIENT HEALTH QUESTIONNAIRE - PHQ9
2. FEELING DOWN, DEPRESSED OR HOPELESS: NOT AT ALL
1. LITTLE INTEREST OR PLEASURE IN DOING THINGS: NOT AT ALL
SUM OF ALL RESPONSES TO PHQ9 QUESTIONS 1 & 2: 0

## 2024-12-31 ASSESSMENT — COLUMBIA-SUICIDE SEVERITY RATING SCALE - C-SSRS
1. IN THE PAST MONTH, HAVE YOU WISHED YOU WERE DEAD OR WISHED YOU COULD GO TO SLEEP AND NOT WAKE UP?: NO
2. HAVE YOU ACTUALLY HAD ANY THOUGHTS OF KILLING YOURSELF?: NO
6. HAVE YOU EVER DONE ANYTHING, STARTED TO DO ANYTHING, OR PREPARED TO DO ANYTHING TO END YOUR LIFE?: NO

## 2025-01-01 ENCOUNTER — APPOINTMENT (OUTPATIENT)
Dept: CARDIOLOGY | Facility: HOSPITAL | Age: 80
DRG: 308 | End: 2025-01-01
Payer: MEDICARE

## 2025-01-01 LAB
ALBUMIN SERPL BCP-MCNC: 3.6 G/DL (ref 3.4–5)
ANION GAP SERPL CALC-SCNC: 16 MMOL/L (ref 10–20)
BUN SERPL-MCNC: 31 MG/DL (ref 6–23)
CALCIUM SERPL-MCNC: 8.6 MG/DL (ref 8.6–10.3)
CHLORIDE SERPL-SCNC: 104 MMOL/L (ref 98–107)
CO2 SERPL-SCNC: 25 MMOL/L (ref 21–32)
CREAT SERPL-MCNC: 0.73 MG/DL (ref 0.5–1.05)
EGFRCR SERPLBLD CKD-EPI 2021: 84 ML/MIN/1.73M*2
ERYTHROCYTE [DISTWIDTH] IN BLOOD BY AUTOMATED COUNT: 13.8 % (ref 11.5–14.5)
GLUCOSE SERPL-MCNC: 105 MG/DL (ref 74–99)
HCT VFR BLD AUTO: 42.6 % (ref 36–46)
HGB BLD-MCNC: 12.8 G/DL (ref 12–16)
MAGNESIUM SERPL-MCNC: 1.92 MG/DL (ref 1.6–2.4)
MCH RBC QN AUTO: 28.1 PG (ref 26–34)
MCHC RBC AUTO-ENTMCNC: 30 G/DL (ref 32–36)
MCV RBC AUTO: 93 FL (ref 80–100)
NRBC BLD-RTO: 0 /100 WBCS (ref 0–0)
PHOSPHATE SERPL-MCNC: 3.5 MG/DL (ref 2.5–4.9)
PLATELET # BLD AUTO: 277 X10*3/UL (ref 150–450)
POTASSIUM SERPL-SCNC: 3.5 MMOL/L (ref 3.5–5.3)
RBC # BLD AUTO: 4.56 X10*6/UL (ref 4–5.2)
SODIUM SERPL-SCNC: 141 MMOL/L (ref 136–145)
URATE SERPL-MCNC: 5.4 MG/DL (ref 2.3–6.7)
WBC # BLD AUTO: 7.2 X10*3/UL (ref 4.4–11.3)

## 2025-01-01 PROCEDURE — 85027 COMPLETE CBC AUTOMATED: CPT

## 2025-01-01 PROCEDURE — 36415 COLL VENOUS BLD VENIPUNCTURE: CPT

## 2025-01-01 PROCEDURE — 93005 ELECTROCARDIOGRAM TRACING: CPT

## 2025-01-01 PROCEDURE — 84550 ASSAY OF BLOOD/URIC ACID: CPT

## 2025-01-01 PROCEDURE — 2500000002 HC RX 250 W HCPCS SELF ADMINISTERED DRUGS (ALT 637 FOR MEDICARE OP, ALT 636 FOR OP/ED)

## 2025-01-01 PROCEDURE — 2500000001 HC RX 250 WO HCPCS SELF ADMINISTERED DRUGS (ALT 637 FOR MEDICARE OP)

## 2025-01-01 PROCEDURE — 83735 ASSAY OF MAGNESIUM: CPT

## 2025-01-01 PROCEDURE — 99232 SBSQ HOSP IP/OBS MODERATE 35: CPT

## 2025-01-01 PROCEDURE — 1200000002 HC GENERAL ROOM WITH TELEMETRY DAILY

## 2025-01-01 PROCEDURE — 93010 ELECTROCARDIOGRAM REPORT: CPT | Performed by: STUDENT IN AN ORGANIZED HEALTH CARE EDUCATION/TRAINING PROGRAM

## 2025-01-01 PROCEDURE — 99223 1ST HOSP IP/OBS HIGH 75: CPT | Performed by: INTERNAL MEDICINE

## 2025-01-01 PROCEDURE — 2500000004 HC RX 250 GENERAL PHARMACY W/ HCPCS (ALT 636 FOR OP/ED): Performed by: NURSE PRACTITIONER

## 2025-01-01 PROCEDURE — 84100 ASSAY OF PHOSPHORUS: CPT

## 2025-01-01 RX ORDER — POTASSIUM CHLORIDE 1.5 G/1.58G
40 POWDER, FOR SOLUTION ORAL ONCE
Status: COMPLETED | OUTPATIENT
Start: 2025-01-01 | End: 2025-01-01

## 2025-01-01 RX ORDER — NYSTATIN 100000 U/G
CREAM TOPICAL 2 TIMES DAILY
Status: DISCONTINUED | OUTPATIENT
Start: 2025-01-01 | End: 2025-01-02 | Stop reason: HOSPADM

## 2025-01-01 RX ORDER — POLYETHYLENE GLYCOL 3350 17 G/17G
17 POWDER, FOR SOLUTION ORAL DAILY
Status: DISCONTINUED | OUTPATIENT
Start: 2025-01-01 | End: 2025-01-02 | Stop reason: HOSPADM

## 2025-01-01 RX ORDER — LEVOTHYROXINE SODIUM 25 UG/1
25 TABLET ORAL DAILY
Status: DISCONTINUED | OUTPATIENT
Start: 2025-01-01 | End: 2025-01-02 | Stop reason: HOSPADM

## 2025-01-01 RX ORDER — AMOXICILLIN 250 MG
2 CAPSULE ORAL 2 TIMES DAILY
Status: DISCONTINUED | OUTPATIENT
Start: 2025-01-01 | End: 2025-01-02 | Stop reason: HOSPADM

## 2025-01-01 RX ORDER — FUROSEMIDE 10 MG/ML
20 INJECTION INTRAMUSCULAR; INTRAVENOUS ONCE
Status: DISCONTINUED | OUTPATIENT
Start: 2025-01-01 | End: 2025-01-02

## 2025-01-01 RX ORDER — TRIAMTERENE/HYDROCHLOROTHIAZID 37.5-25 MG
1 TABLET ORAL DAILY
Status: DISCONTINUED | OUTPATIENT
Start: 2025-01-01 | End: 2025-01-02 | Stop reason: HOSPADM

## 2025-01-01 RX ORDER — DULOXETIN HYDROCHLORIDE 60 MG/1
60 CAPSULE, DELAYED RELEASE ORAL DAILY
Status: DISCONTINUED | OUTPATIENT
Start: 2025-01-01 | End: 2025-01-02 | Stop reason: HOSPADM

## 2025-01-01 RX ORDER — METOPROLOL SUCCINATE 25 MG/1
25 TABLET, EXTENDED RELEASE ORAL DAILY
Status: DISCONTINUED | OUTPATIENT
Start: 2025-01-01 | End: 2025-01-02

## 2025-01-01 RX ORDER — CHOLECALCIFEROL (VITAMIN D3) 25 MCG
2000 TABLET ORAL
Status: DISCONTINUED | OUTPATIENT
Start: 2025-01-01 | End: 2025-01-02 | Stop reason: HOSPADM

## 2025-01-01 RX ORDER — FUROSEMIDE 10 MG/ML
20 INJECTION INTRAMUSCULAR; INTRAVENOUS ONCE
Status: COMPLETED | OUTPATIENT
Start: 2025-01-01 | End: 2025-01-01

## 2025-01-01 RX ORDER — ATORVASTATIN CALCIUM 20 MG/1
20 TABLET, FILM COATED ORAL DAILY
Status: DISCONTINUED | OUTPATIENT
Start: 2025-01-01 | End: 2025-01-02 | Stop reason: HOSPADM

## 2025-01-01 RX ADMIN — DULOXETINE HYDROCHLORIDE 60 MG: 60 CAPSULE, DELAYED RELEASE ORAL at 09:35

## 2025-01-01 RX ADMIN — Medication 2000 UNITS: at 06:02

## 2025-01-01 RX ADMIN — METOPROLOL SUCCINATE 25 MG: 25 TABLET, EXTENDED RELEASE ORAL at 09:35

## 2025-01-01 RX ADMIN — APIXABAN 5 MG: 5 TABLET, FILM COATED ORAL at 09:35

## 2025-01-01 RX ADMIN — POTASSIUM CHLORIDE 40 MEQ: 1.5 POWDER, FOR SOLUTION ORAL at 09:34

## 2025-01-01 RX ADMIN — LEVOTHYROXINE SODIUM 25 MCG: 25 TABLET ORAL at 09:34

## 2025-01-01 RX ADMIN — FUROSEMIDE 20 MG: 10 INJECTION, SOLUTION INTRAMUSCULAR; INTRAVENOUS at 09:40

## 2025-01-01 RX ADMIN — ATORVASTATIN CALCIUM 20 MG: 20 TABLET, FILM COATED ORAL at 09:35

## 2025-01-01 RX ADMIN — TRIAMTERENE AND HYDROCHLOROTHIAZIDE 1 TABLET: 37.5; 25 TABLET ORAL at 09:34

## 2025-01-01 RX ADMIN — APIXABAN 5 MG: 5 TABLET, FILM COATED ORAL at 21:15

## 2025-01-01 RX ADMIN — NYSTATIN: 100000 CREAM TOPICAL at 09:34

## 2025-01-01 ASSESSMENT — COGNITIVE AND FUNCTIONAL STATUS - GENERAL
DAILY ACTIVITIY SCORE: 24
DAILY ACTIVITIY SCORE: 24
MOBILITY SCORE: 24
MOBILITY SCORE: 24

## 2025-01-01 ASSESSMENT — PAIN - FUNCTIONAL ASSESSMENT
PAIN_FUNCTIONAL_ASSESSMENT: 0-10
PAIN_FUNCTIONAL_ASSESSMENT: 0-10

## 2025-01-01 ASSESSMENT — PAIN SCALES - GENERAL
PAINLEVEL_OUTOF10: 0 - NO PAIN

## 2025-01-01 ASSESSMENT — ENCOUNTER SYMPTOMS
SHORTNESS OF BREATH: 1
PALPITATIONS: 1

## 2025-01-01 NOTE — PROGRESS NOTES
01/01/25 0813   Discharge Planning   Living Arrangements Alone   Support Systems Children;Family members   Assistance Needed A&Ox3, indepedent with ADLs, no DME, room air at baseline, drives. PCP Dr. Azalia Way   Type of Residence Private residence   Number of Stairs Within Residence 12   Do you have animals or pets at home? No   Home or Post Acute Services None   Expected Discharge Disposition Home   Does the patient need discharge transport arranged? No   Stroke Family Assessment   Stroke Family Assessment Needed No   Intensity of Service   Intensity of Service 0-30 min

## 2025-01-01 NOTE — HOSPITAL COURSE
Millie Riley is a 79 y.o. female with PMHx hypertension, hyperlipidemia, chronic lymphedema, asthma, and hypothyroidism who presented to [facility] ED on 12/30 for shortness of breath and transferred to [facility] 12/31 for new onset Afib with RVR and CHF.     Initial presentation at [facility] ED notable for hypertension and tachycardia, patient found to be in Afib with RVR. BNP elevated at 752, TSH normal, CMP normal. CXR significant for pulmonary venous congestion and small pleural effusions. Interventions included lasix, metoprolol, and lovenox injections.    Upon transfer to [facility], patient was started on Eliquis 5 mg BID (CHADS2-VAsc: 4) and metoprolol 12.5 mg BID. No prior history of HF, EF 60-65% on most recent echo (June 2022) so TTE was ordered and cardiology was consulted. Patient in atrial fibrillation, with HR going up to 120-140bpm but not sustained.  SANTOS/cardioversion done on 1/2/25 without complications, converted to NSR. Patient has outpatient cardiology follow up on 1/20/25 at [facility].

## 2025-01-01 NOTE — PROGRESS NOTES
Patient: Millie Riley Age: 79 y.o. Gender: female   Room/bed: 130/130-A   Hospital Day: 2     Attending: Abrahan Zheng DO  Code Status:  Full Code    Subjective  Patient seen and examined at bedside. Patient denies chest pain. She states her shortness of breath is only on exertion and it has improved. She denies fever, chills, heart racing.     Objective:  Physical Exam  Constitutional:       General: She is not in acute distress.     Appearance: She is obese. She is not ill-appearing.   Cardiovascular:      Rate and Rhythm: Tachycardia present. Rhythm irregular.      Heart sounds: No murmur heard.  Pulmonary:      Effort: Pulmonary effort is normal. No respiratory distress.      Breath sounds: Normal breath sounds. No wheezing.   Musculoskeletal:      Right lower leg: Edema present.      Left lower leg: Edema present.   Neurological:      Mental Status: She is alert and oriented to person, place, and time.   Psychiatric:         Mood and Affect: Mood normal.         Behavior: Behavior normal.          Temp:  [36.7 °C (98.1 °F)-37 °C (98.6 °F)] 36.7 °C (98.1 °F)  Heart Rate:  [] 106  Resp:  [16] 16  BP: (132-161)/(74-89) 136/87    Vitals:    01/01/25 0600   Weight: 120 kg (263 lb 10.7 oz)             I/Os    Intake/Output Summary (Last 24 hours) at 1/1/2025 0839  Last data filed at 1/1/2025 0600  Gross per 24 hour   Intake 300 ml   Output 200 ml   Net 100 ml       Labs:   CBC:  Recent Labs     01/01/25  0533 03/20/23  1542 05/28/22  0841   WBC 7.2 7.3 5.1   HGB 12.8 13.3 12.8   HCT 42.6 42.1 40.2    269 267   MCV 93 92 91     CMP:  Recent Labs     01/01/25  0533 04/03/24  1053 06/05/23  1452 03/20/23  1542    144  --  138   K 3.5 4.3  --  4.5    106  --  101   CO2 25 28  --  26   ANIONGAP 16 14  --  16   BUN 31* 24*  --  31*   CREATININE 0.73 0.90 1.15* 0.89   EGFR 84 66  --   --    GLUCOSE 105* 102*  --  100*     Recent Labs     01/01/25  0533 04/03/24  1053 03/20/23  1547  "05/28/22  0841   ALBUMIN 3.6 4.1 4.4 4.1   ALKPHOS  --  96 91 89   ALT  --  23 26 28   AST  --  20 22 23   BILITOT  --  0.4 0.5 0.4     Calcium/Phos:   Lab Results   Component Value Date    CALCIUM 8.6 01/01/2025    PHOS 3.5 01/01/2025      COAG: No results for input(s): \"INR\", \"DDIMERVTE\" in the last 92849 hours.  CRP: No results found for: \"CRP\"   [unfilled]   ENDO:  Recent Labs     04/03/24  1053 03/20/23  1542 05/28/22  0841 11/16/20  0911   TSH 1.91 1.95 1.64 1.48   HGBA1C 5.6  --   --   --       CARDIAC:   Recent Labs     06/13/22  1159   BNP 26     Recent Labs     04/03/24  1053 05/28/22  0841 11/16/20  0911   CHOL 169 137 224*   LDLF  --  71 142*   LDLCALC 87  --   --    HDL 62.7 56.4 55.9   TRIG 97 49 132     No data recorded    Micro/ID:   No results found for the last 90 days.                   No lab exists for component: \"AGALPCRNB\"   .ID  No results found for: \"URINECULTURE\", \"BLOODCULT\", \"CSFCULTSMEAR\"    Images:  CT abdomen pelvis w IV contrast  Narrative: Interpreted By:  ADAN HARTLEY MD  STUDY:  CT Abdomen and Pelvis with IV Contrast; 6/6/2023 11:44 AM.     INDICATION:  Abdominal pain and distension.     COMPARISON:  CT chest 2/8/2022.     ACCESSION NUMBER(S):  [id number]     ORDERING CLINICIAN:  DENISE FRANCISCO MD     TECHNIQUE:  CT of the abdomen and pelvis was performed.  Contiguous axial images  were obtained at 3 mm slice thickness through the abdomen and pelvis.   Coronal and sagittal reconstructions at 3 mm slice thickness were  performed.  Omnipaque 350--70 mL was administered intravenously.       FINDINGS:     Abdomen:  The liver is top normal size with mild steatosis.  There are scattered  small cysts, largest in the left lobe at 1.3 cm.  The gallbladder is  normally distended with no radiopaque calculi or biliary duct  dilatation.  Spleen is normal size.  Pancreas is normal without  detectable mass or pancreatitis.  Minimal thickening is seen in the  adrenal glands.     Several " calculi are seen in the left kidney, including a 1.4 cm stone  in the renal pelvis and several smaller stones in the lower pole  calyces, next largest at 9 mm.  The left renal calyces are minimally  hydronephrotic.  No stones are seen in the right kidney, or in either  ureter.  Both kidneys demonstrate a few tiny cysts.     There is no localizing lymphadenopathy or ascites.  Abdominal aorta  and IVC are normal caliber and patent.     Stomach is non-distended with a small hiatal hernia.  There is no  small bowel thickening or obstruction.  Appendix is not seen.  A few  diverticulum are seen in the sigmoid colon without acute  diverticulitis.  Small periumbilical hernia contains fat.     Pelvis:  There is no detectable mass or fluid collection.  The bladder is  decompressed.  There is no significant lymphadenopathy.  Inguinal  rings are minimally patulous containing fat.     Skeletal:  There is mild scattered arthritis in the spine.  No compression  fractures are seen.  There is very minimal degenerative  anterolisthesis at L4-5.     Minimal atelectasis or scarring is seen in the lung bases.  Heart is  normal size.  There are no pleural effusions.     Impression: 1.  No bowel obstruction or detectable bowel inflammation.  2.  Several left-sided renal calculi, including a 1.4 cm stone in the  left renal pelvis with minimal hydronephrosis of the left renal  calyces.    3.  Mild hepatic steatosis with scattered small cysts.  4.  Remainder as above.           Signed by Arnel Spangler MD       Medications:  Scheduled medications  apixaban, 5 mg, oral, q12h  atorvastatin, 20 mg, oral, Daily  cholecalciferol, 2,000 Units, oral, Daily  DULoxetine, 60 mg, oral, Daily  furosemide, 20 mg, intravenous, Once  levothyroxine, 25 mcg, oral, Daily  metoprolol succinate XL, 25 mg, oral, Daily  nystatin, , Topical, BID  polyethylene glycol, 17 g, oral, Daily  potassium chloride, 40 mEq, oral, Once  sennosides-docusate sodium, 2 tablet,  oral, BID  triamterene-hydrochlorothiazid, 1 tablet, oral, Daily      Continuous medications     PRN medications       Assessment and Plan:  Millie Riley is a 79 y.o. female with PMHx hypertension, hyperlipidemia, chronic lymphedema, asthma, and hypothyroidism who presented to [facility] ED on 12/30 for shortness of breath and transferred to [facility] 12/31 for new onset Afib with RVR and CHF.      Acute Medical Issues   #New Onset A-fib with RVR  #New-Onset Congestive Heart Failure  - EKG: Afib with RVR  - CXR notable for pulmonary venous congestion and small pleural effusions  - Last ECHO: EF 60-65% on 6/17/22  - CHADS2-VAsc: 4  - BNP: 752  - TSH: 2.05  PLAN  - Metoprolol 12.5 mg PO BID w/ hold SBP <90 HR<60  - Continue eliquis 5 mg BID tomorrow AM (received lovenox at ED 12/31 PM)  - Telemetry  - Monitor electrolytes, Target Mg >2 & K>4  - Echo pending  - Strict I/Os  - Monitor daily weights;  - Cardiology following, plan for SANTOS/cardioversion tomorrow  - IV lasix today     Chronic Medical Issues   #HTN: PTA triamterene-hydrochlorothiazide 37.5-25 mg daily  #HLD: PTA atorvastatin 20 mg daily  #Hypothyroidism:Levothyroxine 25 mcg daily     F: PO intake & IVF PRN   E: Replete as needed   N: Cardiac diet  GI ppx: None  DVT ppx: Eliquis 5 mg BID  Antibiotics: None  Tubes/Lines/Drains: pIV     Disposition: 79 y.o.female admitted for new-onset Afib with RVR, plan for SANTOS/DCC tomorrow. Continue to monitor.     Marlena Mon, DO  PGY1

## 2025-01-01 NOTE — CONSULTS
Inpatient consult to Cardiology  Consult performed by: MANUEL La-CNP  Consult ordered by: Abrahan Zheng DO        History Of Present Illness:    Millie Riley is a 79 y.o. female with PMH of hypertension, hyperlipidemia, chronic lymphedema, asthma, and hypothyroidism admitted to [facility] for new onset Afib with RVR as a transfer from [facility]. She reports increasing SOB for approximately 4 days prior to presenting. She denies CP. She has chronic LE edema, but reports it's more than usual. EKG shows atrial fibrillation  bpm. Lab work unremarkable. She had a normal NM stress in 11/2022. TTE in 6/2022 showed normal LVEF and mild AS.     Review of Systems   Respiratory:  Positive for shortness of breath.    Cardiovascular:  Positive for palpitations and leg swelling.   All other systems reviewed and are negative.    Last Recorded Vitals:  Vitals:    01/01/25 0053 01/01/25 0102 01/01/25 0527 01/01/25 0600   BP: 161/89 132/74 136/87    BP Location: Left arm Right arm Right arm    Patient Position: Lying Lying Lying    Pulse: 92 101 106    Resp:  16 16    Temp:  36.9 °C (98.4 °F) 36.7 °C (98.1 °F)    TempSrc:  Temporal Temporal    SpO2: 96% 93% 92%    Weight:   120 kg (264 lb 8.8 oz) 120 kg (263 lb 10.7 oz)       Last Labs:  CBC - 1/1/2025:  5:33 AM  7.2 12.8 277    42.6      CMP - 1/1/2025:  5:33 AM  8.6 6.9 20 --- 0.4   3.5 3.6 23 96      PTT - No results in last year.  _   _ _     BNP   Date/Time Value Ref Range Status   06/13/2022 11:59 AM 26 0 - 99 pg/mL Final     Comment:     .  <100 pg/mL - Heart failure unlikely  100-299 pg/mL - Intermediate probability of acute heart  .               failure exacerbation. Correlate with clinical  .               context and patient history.    >=300 pg/mL - Heart Failure likely. Correlate with clinical  .               context and patient history.   Biotin interference may cause falsely decreased results.   Patients taking a  "Biotin dose of up to 5 mg/day should   refrain from taking Biotin for 24 hours before sample   collection. Providers may contact their local laboratory   for further information.       Hemoglobin A1C   Date/Time Value Ref Range Status   04/03/2024 10:53 AM 5.6 see below % Final     LDL Calculated   Date/Time Value Ref Range Status   04/03/2024 10:53 AM 87 <=99 mg/dL Final     Comment:                                 Near   Borderline      AGE      Desirable  Optimal    High     High     Very High     0-19 Y     0 - 109     ---    110-129   >/= 130     ----    20-24 Y     0 - 119     ---    120-159   >/= 160     ----      >24 Y     0 -  99   100-129  130-159   160-189     >/=190       VLDL   Date/Time Value Ref Range Status   04/03/2024 10:53 AM 19 0 - 40 mg/dL Final   05/28/2022 08:41 AM 10 0 - 40 mg/dL Final   11/16/2020 09:11 AM 26 0 - 40 mg/dL Final      Last I/O:  I/O last 3 completed shifts:  In: 300 (2.5 mL/kg) [P.O.:300]  Out: 200 (1.7 mL/kg) [Urine:200 (0 mL/kg/hr)]  Weight: 119.6 kg     Past Cardiology Tests (Last 3 Years):  EKG:  No results found for this or any previous visit from the past 1095 days.    Echo:  (6/17/2022)  CONCLUSIONS:   1. The left ventricular systolic function is normal with a 60-65% estimated ejection fraction.   2. Spectral Doppler shows an impaired relaxation pattern of left ventricular diastolic filling.   3. Mild aortic valve stenosis.   4. There is moderate aortic valve cusp calcification.   5. The pulmonary artery is not well visualized.    Ejection Fractions:  No results found for: \"EF\"  Cath:  No results found for this or any previous visit from the past 1095 days.    Stress Test:  Nuclear Stress Test 11/04/2022  IMPRESSION:  1. Normal stress myocardial perfusion imaging in response to  pharmacologic stress.  2. Calculated ejection fraction is 75% without segmental wall motion  abnormality seen.    Cardiac Imaging:  No results found for this or any previous visit from the past " 1095 days.      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    Past Surgical History:  She has a past surgical history that includes Total knee arthroplasty (06/27/2013) and US guided thyroid biopsy (3/10/2014).      Social History:  She reports that she has never smoked. She has never used smokeless tobacco. She reports current alcohol use. She reports that she does not use drugs.    Family History:  No family history on file.     Allergies:  Patient has no known allergies.    Inpatient Medications:  Scheduled medications   Medication Dose Route Frequency    apixaban  5 mg oral q12h    atorvastatin  20 mg oral Daily    cholecalciferol  2,000 Units oral Daily    DULoxetine  60 mg oral Daily    furosemide  20 mg intravenous Once    levothyroxine  25 mcg oral Daily    metoprolol succinate XL  25 mg oral Daily    nystatin   Topical BID    polyethylene glycol  17 g oral Daily    potassium chloride  40 mEq oral Once    sennosides-docusate sodium  2 tablet oral BID    triamterene-hydrochlorothiazid  1 tablet oral Daily     PRN medications   Medication     Continuous Medications   Medication Dose Last Rate     Outpatient Medications:  Current Outpatient Medications   Medication Instructions    ascorbic acid, vitamin C, 500 mg capsule oral, Daily RT    aspirin 81 mg, oral, 2 times  daily    atorvastatin (LIPITOR) 20 mg, oral, Daily    biotin 10,000 mcg capsule oral, Daily RT    calcitriol (ROCALTROL) 0.25 mcg, oral, Daily RT    cholecalciferol (VITAMIN D-3) 2,000 Units, oral, Daily RT    DULoxetine (CYMBALTA) 60 mg, oral, Daily    levothyroxine (SYNTHROID, LEVOXYL) 25 mcg, oral, Daily    multivitamin (Daily Multi-Vitamin) tablet 1 tablet, oral, Daily    triamcinolone (Kenalog) 0.1 % cream Topical, 2 times daily    triamterene-hydrochlorothiazid (Dyazide) 37.5-25 mg capsule 1 capsule, oral, Daily       Physical Exam  Vitals reviewed.   Constitutional:       Appearance: Normal appearance. She is normal weight.   HENT:      Head: Normocephalic and atraumatic.   Neck:      Vascular: No carotid bruit or JVD.   Cardiovascular:      Rate and Rhythm: Normal rate. Rhythm irregularly irregular.      Pulses: Normal pulses.      Heart sounds: Normal heart sounds.   Pulmonary:      Effort: Pulmonary effort is normal.      Breath sounds: Normal breath sounds.   Abdominal:      General: Abdomen is flat. Bowel sounds are normal.      Palpations: Abdomen is soft.   Musculoskeletal:      Right lower le+ Pitting Edema present.      Left lower le+ Pitting Edema present.   Skin:     General: Skin is warm and dry.   Neurological:      General: No focal deficit present.      Mental Status: She is alert and oriented to person, place, and time. Mental status is at baseline.   Psychiatric:         Mood and Affect: Mood normal.         Behavior: Behavior normal.            Assessment/Plan   Assessment  79 y.o. female with PMH of hypertension, hyperlipidemia, chronic lymphedema, asthma, and hypothyroidism admitted to [facility] for new onset Afib with RVR as a transfer from [facility]. She reports increasing SOB for approximately 4 days prior to presenting. She denies CP. She has chronic LE edema, but reports it's more than usual. EKG shows atrial fibrillation  bpm. Lab work unremarkable.  She had a normal NM stress in 11/2022. TTE in 6/2022 showed normal LVEF and mild AS.     New onset atrial fibrillation with RVR  Mild AS  HTN  HLD  Chronic lymphedema    [id number] Score  Age >= 75: 2   Sex Female: 1   CHF History No: 0   HTN Yes: 1   Stroke/TIA/Thromboembolism No: 0   Vascular Dz: CAD/PAD/Aortic Plaque No: 0   DM No: 0   Total Score 4     Plan  -TTE  -NPO after midnight for SANTOS/DCC tomorrow  -Continue Eliquis 5 mg BID, Toprol 25 mg daily  -Continue atorvastatin 20 mg daily  -IV Lasix today  -Will follow       Code Status:  Full Code    I spent minutes in the professional and overall care of this patient.        MANUEL La-CNP    Patient seen, discussed, and examined with EBONY Gerardo, above is representative of my medical decision making.    Simone Shahid MD

## 2025-01-01 NOTE — H&P
"  HPI   HPI: Millie Riley is a 79 y.o. female with PMHx hypertension, hyperlipidemia, chronic lymphedema, asthma, and hypothyroidism admitted to [facility] for new onset Afib with RVR as a transfer from [facility].     Millie first noticed shortness of breath 4 days ago that was worsened when laying flat and with exertion. No similar symptoms in the past. Associated chest pressure that felt like \"bricks on my chest\" which has improved since onset. Endorses productive cough and palpitations but denies nausea, light headedness, fevers, chills. Leg swelling is slightly worse than at baseline with her chronic lymphedema. First presented to ED on Monday 12/30/24, where she was started on metoprolol and given lasix and lovenox.     Family history notable for afib in grandson and heart failure in father. Denies tobacco use, alcohol, and illicit drug use. Lives at home by herself.     [facility] ED course:     Vitals: Temp 98 F, /107, ,RR 18, Spo2 96% on RA    Labs:   -CBC - WBC 8.53, RBC4.76, Hgb 13.9, HCT44.0, MCV 92.4, Platelet 288, PTT 22.1, INR 1.0  -CMP - Glucose 127, Na 142, K unable to assay due to hemolysis, Cl 103, HCO3 27, BUN 28, Cr 0.80, Ca 8.8, Alk Phos 98, ALT , AST unable to assay due to hemolysis  -UA - 3+ protein, >25 RBC, 6-10 WBC, moderate urate crystals, negative leuk esterase and nitrite  -Troponin - 22 -> 23 -> 27  -BNP - 752  -TSH - 2.05    Imaging:   - CXR: New pulmonary venous congestion and congestive interstitial prominence including peribronchial cuffing, probable small pleural effusions  - EKG: Afib with RVR    Micro:   - UA: as above  - COVID: pending    Interventions: lasix, lovenox, metoprolol    ROS: 12 points review of system is negative except as stated in the HPI above.   Past Medical History     Past Medical History:   Diagnosis Date    Abdominal pain 06/02/2023    Acute otitis externa 03/27/2024    Atypical chest pain 11/04/2022    Breast pain 03/27/2024    " Cellulitis 03/27/2024    Choking 03/27/2024    Choking 03/27/2024    Cough 03/27/2024    COVID-19 03/27/2024    Disorder 03/27/2024    Exposure keratoconjunctivitis of right eye 06/27/2023    Other conditions influencing health status     Skin Abscess Of Toes    Pain in right axilla 03/27/2024    Personal history of COVID-19 06/15/2022    Personal history of other diseases of the musculoskeletal system and connective tissue     History of bursitis    Personal history of other diseases of the nervous system and sense organs     History of sciatica    Ulcer of ankle (Multi) 03/27/2024      Surgical History     Past Surgical History:   Procedure Laterality Date    TOTAL KNEE ARTHROPLASTY  06/27/2013    Knee Replacement    US GUIDED THYROID BIOPSY  3/10/2014    US GUIDED THYROID BIOPSY 3/10/2014 [facility]     Family History   No family history on file.  Social History     Social History     Socioeconomic History    Marital status:      Spouse name: Not on file    Number of children: Not on file    Years of education: Not on file    Highest education level: Not on file   Occupational History    Not on file   Tobacco Use    Smoking status: Never    Smokeless tobacco: Never   Substance and Sexual Activity    Alcohol use: Yes    Drug use: Never    Sexual activity: Not on file   Other Topics Concern    Not on file   Social History Narrative    Not on file     Social Drivers of Health     Financial Resource Strain: Low Risk  (12/31/2024)    Overall Financial Resource Strain (CARDIA)     Difficulty of Paying Living Expenses: Not very hard   Food Insecurity: No Food Insecurity (12/31/2024)    Hunger Vital Sign     Worried About Running Out of Food in the Last Year: Never true     Ran Out of Food in the Last Year: Never true   Transportation Needs: No Transportation Needs (12/31/2024)    PRAPARE - Transportation     Lack of Transportation (Medical): No     Lack of Transportation (Non-Medical): No   Physical  Activity: Not on file   Stress: No Stress Concern Present (12/31/2024)    [employer] - Occupational Stress Questionnaire     Feeling of Stress : Not at all   Social Connections: Not on file   Intimate Partner Violence: Not At Risk (12/31/2024)    Humiliation, Afraid, Rape, and Kick questionnaire     Fear of Current or Ex-Partner: No     Emotionally Abused: No     Physically Abused: No     Sexually Abused: No   Housing Stability: Low Risk  (12/31/2024)    Housing Stability Vital Sign     Unable to Pay for Housing in the Last Year: No     Number of Times Moved in the Last Year: 0     Homeless in the Last Year: No       Tobacco Use: Low Risk  (10/3/2024)    Patient History     Smoking Tobacco Use: Never     Smokeless Tobacco Use: Never     Passive Exposure: Not on file        Social History     Substance and Sexual Activity   Alcohol Use Yes      Allergies   No Known Allergies   Meds    Scheduled medications  apixaban, 5 mg, oral, q12h  atorvastatin, 20 mg, oral, Daily  cholecalciferol, 2,000 Units, oral, Daily  DULoxetine, 60 mg, oral, Daily  furosemide, 20 mg, intravenous, Once  levothyroxine, 25 mcg, oral, Daily  metoprolol succinate XL, 25 mg, oral, Daily  polyethylene glycol, 17 g, oral, Daily  sennosides-docusate sodium, 2 tablet, oral, BID  triamterene-hydrochlorothiazid, 1 tablet, oral, Daily      Continuous medications     PRN medications     Objective     Vitals  Visit Vitals  /84 (BP Location: Left arm, Patient Position: Sitting)   Pulse 91   Temp 37 °C (98.6 °F) (Temporal)   Resp 16   SpO2 93%   Smoking Status Never        Physical Examination:  Physical Exam  Constitutional:       Appearance: She is obese.   Cardiovascular:      Rate and Rhythm: Rhythm irregularly irregular.      Heart sounds: No murmur heard.  Pulmonary:      Effort: Pulmonary effort is normal. No respiratory distress.   Abdominal:      General: There is no distension.      Palpations: Abdomen is soft.  "     Tenderness: There is no abdominal tenderness.   Musculoskeletal:      Right lower leg: Edema present.      Left lower leg: Edema present.      Comments: Bilateral 2+ pitting edema, L > R   Skin:     General: Skin is warm and dry.      Comments: Stasis dermatitis bilateral lower extremities   Neurological:      General: No focal deficit present.      Mental Status: She is alert and oriented to person, place, and time.   Psychiatric:         Mood and Affect: Mood normal.         Behavior: Behavior normal.           I/Os  No intake or output data in the 24 hours ending 01/01/25 0053    Labs:             Lab Results   Component Value Date    CALCIUM 9.0 04/03/2024      No results found for: \"CRP\"       Micro/ID:   No results found for the last 90 days.                   No lab exists for component: \"AGALPCRNB\"     No results found for: \"URINECULTURE\", \"BLOODCULT\", \"CSFCULTSMEAR\"  Images    XR chest 1 view    Result Date: 12/30/2024  IMPRESSION: 1.  New pulmonary venous congestion and congestive and/or inflammatory interstitial prominence including peribronchial cuffing 2.  New probable at least small pleural effusions and mildly hypoexpanded, hypoaerated subjacent lung bases especially left lower lobe where there is suspected small patchy consolidation : PSCB   Transcribe Date/Time: Dec 30 2024  3:10P Dictated by : FRANK CARRANZA MD This examination was interpreted and the report reviewed and electronically signed by: FRANK CARRANZA MD on Dec 30 2024  3:17PM  EST    Assessment and Plan    Millie Riley is a 79 y.o. female with PMHx hypertension, hyperlipidemia, chronic lymphedema, asthma, and hypothyroidism who presented to [facility] ED on 12/30 for shortness of breath and transferred to [facility] 12/31 for new onset Afib with RVR and CHF.     Acute Medical Issues   #New Onset A-fib with RVR  #New-Onset Congestive Heart Failure  - EKG: Afib with RVR  - CXR notable for pulmonary venous congestion and small " pleural effusions  - Last ECHO: EF 60-65% on 6/17/22  - CHADS2-VAsc: 4  - Afebrile, absent leukocytosis.  - Troponin: 22 -> 23 -> 27 in ED  - BNP: 752  -TSH: 2.05  PLAN  - Metoprolol 12.5 mg PO BID w/ hold SBP <90 HR<60  - Start eliquis 5 mg BID tomorrow AM (received lovenox at ED 12/31 PM)  - Cardiology consulted  - Telemetry  - Target Mg >2 & K>4  - Echo; pending  - Strict I/Os  - Monitor daily weights;   - Monitor electrolytes    Chronic Medical Issues   #HTN: PTA triamterene-hydrochlorothiazide 37.5-25 mg daily  #HLD: PTA atorvastatin 20 mg daily  #Hypothyroidism:Levothyroxine 25 mcg daily    F: PO intake & IVF PRN   E: Replete as needed   N: Cardiac diet  GI ppx: None  DVT ppx: Eliquis 5 mg BID  Antibiotics: None  Tubes/Lines/Drains: pIV    Code Status: Full Code   Emergency Contact: Extended Emergency Contact Information  Primary Emergency Contact: Lonny Riley  Home Phone: [phone]  Relation: Spouse     Disposition: 79 y.o.female admitted for new-onset Afib with RVR.  Estimated length of stay greater than 48 hrs.     Jhoan Zaman MD  PGY-1 Transitional Year

## 2025-01-01 NOTE — NURSING NOTE
12-31-24/2330: pt arrived to unit, assumed pt care    1-1-25/0130: pt refused to let me take pictures of redness to breasts and abdomen folds, refused to let me do a SARs test, and take IV lasix b/c she states she wants to sleep, let residents know.     1-1-25/0601: pt still refuses to take the SARS nasal test

## 2025-01-01 NOTE — CARE PLAN
The patient's goals for the shift include      The clinical goals for the shift include Patient remain HDS this shift    Assumed care of patient at 0700. Awake, alert and interactive. Up in chair during nurse shift report. Denies any pain/discomforts. No c/o voiced. Bedside table, call light and personal belongings within reach. Will cont. To monitor.

## 2025-01-02 ENCOUNTER — APPOINTMENT (OUTPATIENT)
Dept: CARDIOLOGY | Facility: HOSPITAL | Age: 80
DRG: 308 | End: 2025-01-02
Payer: MEDICARE

## 2025-01-02 ENCOUNTER — PHARMACY VISIT (OUTPATIENT)
Dept: PHARMACY | Facility: CLINIC | Age: 80
End: 2025-01-02
Payer: COMMERCIAL

## 2025-01-02 ENCOUNTER — ANESTHESIA EVENT (OUTPATIENT)
Dept: CARDIOLOGY | Facility: HOSPITAL | Age: 80
End: 2025-01-02
Payer: MEDICARE

## 2025-01-02 ENCOUNTER — ANESTHESIA (OUTPATIENT)
Dept: CARDIOLOGY | Facility: HOSPITAL | Age: 80
End: 2025-01-02
Payer: MEDICARE

## 2025-01-02 VITALS
BODY MASS INDEX: 43.31 KG/M2 | DIASTOLIC BLOOD PRESSURE: 76 MMHG | WEIGHT: 259.92 LBS | OXYGEN SATURATION: 90 % | RESPIRATION RATE: 19 BRPM | HEIGHT: 65 IN | TEMPERATURE: 97 F | HEART RATE: 84 BPM | SYSTOLIC BLOOD PRESSURE: 137 MMHG

## 2025-01-02 LAB
ALBUMIN SERPL BCP-MCNC: 3.8 G/DL (ref 3.4–5)
ANION GAP SERPL CALC-SCNC: 13 MMOL/L (ref 10–20)
AORTIC VALVE MEAN GRADIENT: 1 MMHG
AORTIC VALVE PEAK VELOCITY: 0.67 M/S
ATRIAL RATE: 107 BPM
ATRIAL RATE: 86 BPM
AV PEAK GRADIENT: 2 MMHG
AVA (PEAK VEL): 1.87 CM2
AVA (VTI): 1.75 CM2
BUN SERPL-MCNC: 32 MG/DL (ref 6–23)
CALCIUM SERPL-MCNC: 8.9 MG/DL (ref 8.6–10.3)
CHLORIDE SERPL-SCNC: 102 MMOL/L (ref 98–107)
CO2 SERPL-SCNC: 28 MMOL/L (ref 21–32)
CREAT SERPL-MCNC: 0.84 MG/DL (ref 0.5–1.05)
EGFRCR SERPLBLD CKD-EPI 2021: 71 ML/MIN/1.73M*2
EJECTION FRACTION APICAL 4 CHAMBER: 56
EJECTION FRACTION: 58 %
EJECTION FRACTION: 58 %
ERYTHROCYTE [DISTWIDTH] IN BLOOD BY AUTOMATED COUNT: 13.4 % (ref 11.5–14.5)
GLUCOSE SERPL-MCNC: 111 MG/DL (ref 74–99)
HCT VFR BLD AUTO: 43 % (ref 36–46)
HGB BLD-MCNC: 13.3 G/DL (ref 12–16)
LEFT VENTRICLE INTERNAL DIMENSION DIASTOLE: 5.23 CM (ref 3.5–6)
LEFT VENTRICULAR OUTFLOW TRACT DIAMETER: 1.64 CM
MAGNESIUM SERPL-MCNC: 1.91 MG/DL (ref 1.6–2.4)
MCH RBC QN AUTO: 28.4 PG (ref 26–34)
MCHC RBC AUTO-ENTMCNC: 30.9 G/DL (ref 32–36)
MCV RBC AUTO: 92 FL (ref 80–100)
MITRAL VALVE E/A RATIO: 2.85
NRBC BLD-RTO: 0 /100 WBCS (ref 0–0)
P AXIS: 100 DEGREES
P OFFSET: 160 MS
P ONSET: 103 MS
PHOSPHATE SERPL-MCNC: 3.9 MG/DL (ref 2.5–4.9)
PLATELET # BLD AUTO: 276 X10*3/UL (ref 150–450)
POTASSIUM SERPL-SCNC: 4 MMOL/L (ref 3.5–5.3)
PR INTERVAL: 214 MS
Q ONSET: 209 MS
Q ONSET: 210 MS
QRS COUNT: 15 BEATS
QRS COUNT: 18 BEATS
QRS DURATION: 88 MS
QRS DURATION: 96 MS
QT INTERVAL: 346 MS
QT INTERVAL: 404 MS
QTC CALCULATION(BAZETT): 468 MS
QTC CALCULATION(BAZETT): 483 MS
QTC FREDERICIA: 423 MS
QTC FREDERICIA: 455 MS
R AXIS: -27 DEGREES
R AXIS: 200 DEGREES
RBC # BLD AUTO: 4.68 X10*6/UL (ref 4–5.2)
RIGHT VENTRICLE FREE WALL PEAK S': 8 CM/S
RIGHT VENTRICLE PEAK SYSTOLIC PRESSURE: 22.9 MMHG
RIGHT VENTRICLE PEAK SYSTOLIC PRESSURE: 40.6 MMHG
SODIUM SERPL-SCNC: 139 MMOL/L (ref 136–145)
T AXIS: 114 DEGREES
T AXIS: 62 DEGREES
T OFFSET: 382 MS
T OFFSET: 412 MS
VENTRICULAR RATE: 110 BPM
VENTRICULAR RATE: 86 BPM
WBC # BLD AUTO: 5.4 X10*3/UL (ref 4.4–11.3)

## 2025-01-02 PROCEDURE — 7100000002 HC RECOVERY ROOM TIME - EACH INCREMENTAL 1 MINUTE

## 2025-01-02 PROCEDURE — 93320 DOPPLER ECHO COMPLETE: CPT | Performed by: STUDENT IN AN ORGANIZED HEALTH CARE EDUCATION/TRAINING PROGRAM

## 2025-01-02 PROCEDURE — 93325 DOPPLER ECHO COLOR FLOW MAPG: CPT | Performed by: STUDENT IN AN ORGANIZED HEALTH CARE EDUCATION/TRAINING PROGRAM

## 2025-01-02 PROCEDURE — 7100000010 HC PHASE TWO TIME - EACH INCREMENTAL 1 MINUTE

## 2025-01-02 PROCEDURE — 3700000001 HC GENERAL ANESTHESIA TIME - INITIAL BASE CHARGE

## 2025-01-02 PROCEDURE — 7100000001 HC RECOVERY ROOM TIME - INITIAL BASE CHARGE

## 2025-01-02 PROCEDURE — B24BZZ4 ULTRASONOGRAPHY OF HEART WITH AORTA, TRANSESOPHAGEAL: ICD-10-PCS | Performed by: NURSE PRACTITIONER

## 2025-01-02 PROCEDURE — 2500000004 HC RX 250 GENERAL PHARMACY W/ HCPCS (ALT 636 FOR OP/ED)

## 2025-01-02 PROCEDURE — C8929 TTE W OR WO FOL WCON,DOPPLER: HCPCS

## 2025-01-02 PROCEDURE — 93320 DOPPLER ECHO COMPLETE: CPT

## 2025-01-02 PROCEDURE — 3700000002 HC GENERAL ANESTHESIA TIME - EACH INCREMENTAL 1 MINUTE

## 2025-01-02 PROCEDURE — 2500000001 HC RX 250 WO HCPCS SELF ADMINISTERED DRUGS (ALT 637 FOR MEDICARE OP): Performed by: STUDENT IN AN ORGANIZED HEALTH CARE EDUCATION/TRAINING PROGRAM

## 2025-01-02 PROCEDURE — 92960 CARDIOVERSION ELECTRIC EXT: CPT

## 2025-01-02 PROCEDURE — 99152 MOD SED SAME PHYS/QHP 5/>YRS: CPT | Performed by: STUDENT IN AN ORGANIZED HEALTH CARE EDUCATION/TRAINING PROGRAM

## 2025-01-02 PROCEDURE — 93312 ECHO TRANSESOPHAGEAL: CPT | Performed by: STUDENT IN AN ORGANIZED HEALTH CARE EDUCATION/TRAINING PROGRAM

## 2025-01-02 PROCEDURE — 36415 COLL VENOUS BLD VENIPUNCTURE: CPT

## 2025-01-02 PROCEDURE — 93306 TTE W/DOPPLER COMPLETE: CPT | Performed by: STUDENT IN AN ORGANIZED HEALTH CARE EDUCATION/TRAINING PROGRAM

## 2025-01-02 PROCEDURE — 7100000009 HC PHASE TWO TIME - INITIAL BASE CHARGE

## 2025-01-02 PROCEDURE — 99152 MOD SED SAME PHYS/QHP 5/>YRS: CPT

## 2025-01-02 PROCEDURE — 93010 ELECTROCARDIOGRAM REPORT: CPT | Performed by: INTERNAL MEDICINE

## 2025-01-02 PROCEDURE — 99100 ANES PT EXTEME AGE<1 YR&>70: CPT | Performed by: ANESTHESIOLOGY

## 2025-01-02 PROCEDURE — 2500000005 HC RX 250 GENERAL PHARMACY W/O HCPCS: Performed by: STUDENT IN AN ORGANIZED HEALTH CARE EDUCATION/TRAINING PROGRAM

## 2025-01-02 PROCEDURE — 93005 ELECTROCARDIOGRAM TRACING: CPT

## 2025-01-02 PROCEDURE — RXMED WILLOW AMBULATORY MEDICATION CHARGE

## 2025-01-02 PROCEDURE — 2500000002 HC RX 250 W HCPCS SELF ADMINISTERED DRUGS (ALT 637 FOR MEDICARE OP, ALT 636 FOR OP/ED)

## 2025-01-02 PROCEDURE — 92960 CARDIOVERSION ELECTRIC EXT: CPT | Performed by: STUDENT IN AN ORGANIZED HEALTH CARE EDUCATION/TRAINING PROGRAM

## 2025-01-02 PROCEDURE — 83735 ASSAY OF MAGNESIUM: CPT

## 2025-01-02 PROCEDURE — A93312 PR ECHO HEART,TRANSESOPHAGEAL,COMPLETE: Performed by: ANESTHESIOLOGY

## 2025-01-02 PROCEDURE — 2500000004 HC RX 250 GENERAL PHARMACY W/ HCPCS (ALT 636 FOR OP/ED): Performed by: STUDENT IN AN ORGANIZED HEALTH CARE EDUCATION/TRAINING PROGRAM

## 2025-01-02 PROCEDURE — 2500000004 HC RX 250 GENERAL PHARMACY W/ HCPCS (ALT 636 FOR OP/ED): Performed by: ANESTHESIOLOGY

## 2025-01-02 PROCEDURE — 99239 HOSP IP/OBS DSCHRG MGMT >30: CPT

## 2025-01-02 PROCEDURE — 80069 RENAL FUNCTION PANEL: CPT

## 2025-01-02 PROCEDURE — 5A2204Z RESTORATION OF CARDIAC RHYTHM, SINGLE: ICD-10-PCS | Performed by: NURSE PRACTITIONER

## 2025-01-02 PROCEDURE — 85027 COMPLETE CBC AUTOMATED: CPT

## 2025-01-02 PROCEDURE — 2500000001 HC RX 250 WO HCPCS SELF ADMINISTERED DRUGS (ALT 637 FOR MEDICARE OP)

## 2025-01-02 RX ORDER — DILTIAZEM HYDROCHLORIDE 120 MG/1
240 CAPSULE, COATED, EXTENDED RELEASE ORAL DAILY
Status: DISCONTINUED | OUTPATIENT
Start: 2025-01-02 | End: 2025-01-02

## 2025-01-02 RX ORDER — DILTIAZEM HYDROCHLORIDE 180 MG/1
180 CAPSULE, COATED, EXTENDED RELEASE ORAL DAILY
Qty: 30 CAPSULE | Refills: 0 | Status: SHIPPED | OUTPATIENT
Start: 2025-01-02 | End: 2025-02-01

## 2025-01-02 RX ORDER — LIDOCAINE HYDROCHLORIDE 20 MG/ML
SOLUTION OROPHARYNGEAL AS NEEDED
Status: DISCONTINUED | OUTPATIENT
Start: 2025-01-02 | End: 2025-01-02 | Stop reason: HOSPADM

## 2025-01-02 RX ORDER — MIDAZOLAM HYDROCHLORIDE 1 MG/ML
INJECTION, SOLUTION INTRAMUSCULAR; INTRAVENOUS AS NEEDED
Status: DISCONTINUED | OUTPATIENT
Start: 2025-01-02 | End: 2025-01-02

## 2025-01-02 RX ORDER — FENTANYL CITRATE 50 UG/ML
INJECTION, SOLUTION INTRAMUSCULAR; INTRAVENOUS AS NEEDED
Status: DISCONTINUED | OUTPATIENT
Start: 2025-01-02 | End: 2025-01-02

## 2025-01-02 RX ORDER — SODIUM CHLORIDE 9 MG/ML
INJECTION, SOLUTION INTRAVENOUS CONTINUOUS PRN
Status: DISCONTINUED | OUTPATIENT
Start: 2025-01-02 | End: 2025-01-02 | Stop reason: HOSPADM

## 2025-01-02 RX ORDER — DILTIAZEM HYDROCHLORIDE 180 MG/1
180 CAPSULE, COATED, EXTENDED RELEASE ORAL DAILY
Status: DISCONTINUED | OUTPATIENT
Start: 2025-01-02 | End: 2025-01-02 | Stop reason: HOSPADM

## 2025-01-02 RX ORDER — PROPOFOL 10 MG/ML
INJECTION, EMULSION INTRAVENOUS AS NEEDED
Status: DISCONTINUED | OUTPATIENT
Start: 2025-01-02 | End: 2025-01-02

## 2025-01-02 RX ORDER — MAGNESIUM SULFATE HEPTAHYDRATE 40 MG/ML
2 INJECTION, SOLUTION INTRAVENOUS ONCE
Status: COMPLETED | OUTPATIENT
Start: 2025-01-02 | End: 2025-01-02

## 2025-01-02 RX ADMIN — Medication 2 L/MIN: at 08:49

## 2025-01-02 RX ADMIN — FENTANYL CITRATE 25 MCG: 50 INJECTION, SOLUTION INTRAMUSCULAR; INTRAVENOUS at 09:03

## 2025-01-02 RX ADMIN — PROPOFOL 25 MG: 10 INJECTION, EMULSION INTRAVENOUS at 09:08

## 2025-01-02 RX ADMIN — DULOXETINE HYDROCHLORIDE 60 MG: 60 CAPSULE, DELAYED RELEASE ORAL at 11:54

## 2025-01-02 RX ADMIN — ATORVASTATIN CALCIUM 20 MG: 20 TABLET, FILM COATED ORAL at 11:54

## 2025-01-02 RX ADMIN — PROPOFOL 25 MG: 10 INJECTION, EMULSION INTRAVENOUS at 08:57

## 2025-01-02 RX ADMIN — PROPOFOL 25 MG: 10 INJECTION, EMULSION INTRAVENOUS at 08:40

## 2025-01-02 RX ADMIN — BENZOCAINE, BUTAMBEN, AND TETRACAINE HYDROCHLORIDE 3 SPRAY: .028; .004; .004 AEROSOL, SPRAY TOPICAL at 09:02

## 2025-01-02 RX ADMIN — LIDOCAINE HYDROCHLORIDE 1.25 ML: 20 SOLUTION ORAL at 09:02

## 2025-01-02 RX ADMIN — PROPOFOL 25 MG: 10 INJECTION, EMULSION INTRAVENOUS at 08:52

## 2025-01-02 RX ADMIN — MAGNESIUM SULFATE HEPTAHYDRATE 2 G: 2 INJECTION, SOLUTION INTRAVENOUS at 10:28

## 2025-01-02 RX ADMIN — PROPOFOL 25 MG: 10 INJECTION, EMULSION INTRAVENOUS at 08:47

## 2025-01-02 RX ADMIN — MIDAZOLAM 2 MG: 1 INJECTION INTRAMUSCULAR; INTRAVENOUS at 08:32

## 2025-01-02 RX ADMIN — PROPOFOL 50 MG: 10 INJECTION, EMULSION INTRAVENOUS at 08:37

## 2025-01-02 RX ADMIN — DILTIAZEM HYDROCHLORIDE 180 MG: 180 CAPSULE, COATED, EXTENDED RELEASE ORAL at 12:03

## 2025-01-02 RX ADMIN — PERFLUTREN 1 ML OF DILUTION: 6.52 INJECTION, SUSPENSION INTRAVENOUS at 10:01

## 2025-01-02 RX ADMIN — Medication 2000 UNITS: at 05:47

## 2025-01-02 RX ADMIN — TRIAMTERENE AND HYDROCHLOROTHIAZIDE 1 TABLET: 37.5; 25 TABLET ORAL at 11:54

## 2025-01-02 RX ADMIN — APIXABAN 5 MG: 5 TABLET, FILM COATED ORAL at 11:54

## 2025-01-02 RX ADMIN — FENTANYL CITRATE 25 MCG: 50 INJECTION, SOLUTION INTRAMUSCULAR; INTRAVENOUS at 08:32

## 2025-01-02 RX ADMIN — SENNOSIDES AND DOCUSATE SODIUM 2 TABLET: 50; 8.6 TABLET ORAL at 11:54

## 2025-01-02 RX ADMIN — LEVOTHYROXINE SODIUM 25 MCG: 25 TABLET ORAL at 11:54

## 2025-01-02 RX ADMIN — SODIUM CHLORIDE 50 ML/HR: 9 INJECTION, SOLUTION INTRAVENOUS at 09:06

## 2025-01-02 RX ADMIN — PROPOFOL 25 MG: 10 INJECTION, EMULSION INTRAVENOUS at 08:32

## 2025-01-02 SDOH — HEALTH STABILITY: MENTAL HEALTH: CURRENT SMOKER: 0

## 2025-01-02 ASSESSMENT — COGNITIVE AND FUNCTIONAL STATUS - GENERAL
DAILY ACTIVITIY SCORE: 24
MOBILITY SCORE: 24

## 2025-01-02 ASSESSMENT — PAIN - FUNCTIONAL ASSESSMENT
PAIN_FUNCTIONAL_ASSESSMENT: 0-10

## 2025-01-02 ASSESSMENT — PAIN SCALES - GENERAL
PAIN_LEVEL: 2
PAINLEVEL_OUTOF10: 0 - NO PAIN

## 2025-01-02 NOTE — ANESTHESIA PREPROCEDURE EVALUATION
Patient: Millie Riley    Procedure Information       Anesthesia Start Date/Time: 01/02/25 0830    Scheduled providers: Ludwin Calderon MD    Procedure: TRANSESOPHAGEAL ECHO (SANTOS) W/ POSSIBLE CARDIOVERSION    Location: [facility]            Relevant Problems   Cardiac   (+) Atrial fibrillation (Multi)   (+) Benign essential HTN   (+) Hyperlipidemia   (+) Murmur      Pulmonary   (+) Asthmatic bronchitis (HHS-HCC)   (+) SOB (shortness of breath) on exertion      Endocrine   (+) Hypothyroidism      Musculoskeletal   (+) Fibromyalgia   (+) Osteoarthritis      ID   (+) Herpes zoster       Clinical information reviewed:   Tobacco  Allergies  Meds  Problems  Med Hx  Surg Hx   Fam Hx  Soc   Hx        NPO Detail:  NPO/Void Status  Date of Last Liquid: 01/02/25  Time of Last Liquid: 0001  Date of Last Solid: 01/02/25  Time of Last Solid: 0001         PHYSICAL EXAM    Anesthesia Plan    History of general anesthesia?: yes  History of complications of general anesthesia?: no    ASA 3     MAC

## 2025-01-02 NOTE — PROCEDURES
Procedure narrative:  The risks, benefits, and alternatives to the procedure and sedation were explained to the patient, and informed consent was obtained. The patient was in the fasting state. A grounding pad was placed. Self-adhesive anterior-posterior defibrillation pads were applied. A defibrillator was used for monitoring and the defibrillator waveform was set to biphasic. The patient was set up for continuous monitoring of surface 12 lead ECG, capnography, and pulse oximetry. Blood pressure was monitored with automatic cuff measurements. The procedure was performed under IV conscious sedation supplemented with intermittent deep sedation delivered by anaesthesia.    Transesophageal echo (SANTOS) probe was inserted. There was no left atrial appendage thrombus found. SANTOS probe was withdrawn successfully.    When pt had been adequately sedated, they were cardioverted with a 200J biphasic shock.  This restored sinus rhythm which was confirmed by tele and 12-lead ECG.       Summary:  Patient was successfully cardioverted from Atrial fibrillation to Sinus rhythm    Complications:  Patient tolerated the procedure well with no complications noted.      Patient Instructions:  - No Driving or making legal decisions for 24 hours  - DO NOT Stop your blood thinner unless emergency without checking in with your doctor     Follow up:   DO NOT Stop your blood thinner unless emergency without checking in with your doctor.  No driving, alcohol or making legal decisions for 24 hours.  Plan for follow up with patient's cardiologist/electrophysiologist as scheduled

## 2025-01-02 NOTE — DISCHARGE SUMMARY
Discharge Diagnosis  Atrial fibrillation (Multi)    Issues Requiring Follow-Up  New onset atrial fibrillation s/p cardioversion  - converted to NSR s/p cardioversion  - Continue Eliquis 1 tablet (5 mg) every 12 hours  - Start Diltiazem 180mg 1 capsule daily   - Continue home lipitor  - Stop home aspirin   - Recommend sleep study outpatient, a sleep medicine referral has been sent   - Follow up with Cardiology outpatient, Dr. Wilkinson, Jan 20, 2025 9:20am.     Discharge Meds     Medication List      START taking these medications     dilTIAZem  mg 24 hr capsule; Commonly known as: Cardizem CD; Take   1 capsule (180 mg) by mouth once daily.   Eliquis 5 mg tablet; Generic drug: apixaban; Take 1 tablet (5 mg) by   mouth every 12 hours.     CONTINUE taking these medications     ascorbic acid (vitamin C) 500 mg capsule   atorvastatin 20 mg tablet; Commonly known as: Lipitor; TAKE 1 TABLET BY   MOUTH EVERY DAY   biotin 10,000 mcg capsule   calcitriol 0.25 mcg capsule; Commonly known as: Rocaltrol   cholecalciferol 50 MCG (2000 UT) tablet; Commonly known as: Vitamin D-3   Daily Multi-Vitamin tablet; Generic drug: multivitamin   DULoxetine 60 mg DR capsule; Commonly known as: Cymbalta; TAKE 1 CAPSULE   BY MOUTH ONCE DAILY.   levothyroxine 25 mcg tablet; Commonly known as: Synthroid, Levoxyl; TAKE   1 TABLET BY MOUTH EVERY DAY   triamcinolone 0.1 % cream; Commonly known as: Kenalog; Apply topically 2   times a day.   triamterene-hydrochlorothiazid 37.5-25 mg capsule; Commonly known as:   Dyazide; TAKE 1 CAPSULE BY MOUTH EVERY DAY     STOP taking these medications     aspirin 81 mg EC tablet       Test Results Pending At Discharge  Pending Labs       No current pending labs.            Hospital Course  Millie Riley is a 79 y.o. female with PMHx hypertension, hyperlipidemia, chronic lymphedema, asthma, and hypothyroidism who presented to [facility] ED on 12/30 for shortness of breath and [facility] for new  onset Afib with RVR and CHF.     Initial presentation at [facility] ED notable for hypertension and tachycardia, patient found to be in Afib with RVR. BNP elevated at 752, TSH normal, CMP normal. CXR significant for pulmonary venous congestion and small pleural effusions. Interventions included lasix, metoprolol, and lovenox injections.    Upon transfer to [facility], patient was started on Eliquis 5 mg BID (CHADS2-VAsc: 4) and metoprolol 12.5 mg BID. No prior history of HF, EF 60-65% on most recent echo (June 2022) so TTE was ordered and cardiology was consulted. Patient in atrial fibrillation, with HR going up to 120-140bpm but not sustained.  SANTOS/cardioversion done on 1/2/25 without complications, converted to NSR. Patient has outpatient cardiology follow up on 1/20/25 at [facility].     Pertinent Physical Exam At Time of Discharge  Physical Exam  Constitutional:       General: She is not in acute distress.     Appearance: She is obese.   Eyes:      Extraocular Movements: Extraocular movements intact.   Cardiovascular:      Rate and Rhythm: Regular rhythm.      Heart sounds: Normal heart sounds.   Pulmonary:      Effort: Pulmonary effort is normal. No respiratory distress.      Breath sounds: Normal breath sounds. No wheezing.   Abdominal:      General: There is no distension.      Palpations: Abdomen is soft.      Tenderness: There is no abdominal tenderness.   Musculoskeletal:      Right lower leg: Edema present.      Left lower leg: Edema present.   Skin:     General: Skin is warm and dry.   Neurological:      General: No focal deficit present.      Mental Status: She is alert and oriented to person, place, and time.   Psychiatric:         Mood and Affect: Mood normal.         Behavior: Behavior normal.         Outpatient Follow-Up  Future Appointments   Date Time Provider Department Center   1/13/2025  1:30 PM Azalia Way MD [id number] [facility]   1/20/2025  9:20 AM  Shane Wilkinson MD UCR1 [facility]         Marlena Mon, DO

## 2025-01-02 NOTE — ANESTHESIA PREPROCEDURE EVALUATION
Patient: Millie Riley    Procedure Information       Anesthesia Start Date/Time: 01/02/25 0830    Scheduled providers: Ludwin Calderon MD    Procedure: TRANSESOPHAGEAL ECHO (SANTOS) W/ POSSIBLE CARDIOVERSION    Location: [facility]            Relevant Problems   Cardiac   (+) Atrial fibrillation (Multi)   (+) Benign essential HTN   (+) Hyperlipidemia   (+) Murmur      Pulmonary   (+) Asthmatic bronchitis (HHS-HCC)   (+) SOB (shortness of breath) on exertion      Endocrine   (+) Hypothyroidism      Musculoskeletal   (+) Fibromyalgia   (+) Osteoarthritis      ID   (+) Herpes zoster       Clinical information reviewed:   Tobacco  Allergies  Meds  Problems  Med Hx  Surg Hx   Fam Hx  Soc   Hx        NPO Detail:  NPO/Void Status  Date of Last Liquid: 01/02/25  Time of Last Liquid: 0001  Date of Last Solid: 01/02/25  Time of Last Solid: 0001         Physical Exam    Airway  Mallampati: III  TM distance: >3 FB  Neck ROM: full     Cardiovascular   Rhythm: irregular     Dental    Pulmonary - normal exam     Abdominal   (+) obese         Anesthesia Plan

## 2025-01-02 NOTE — ANESTHESIA PREPROCEDURE EVALUATION
Patient: Millie Riley    Procedure Information       Anesthesia Start Date/Time: 01/02/25 0830    Scheduled providers: Ludwin Calderon MD    Procedure: TRANSESOPHAGEAL ECHO (SANTOS) W/ POSSIBLE CARDIOVERSION    Location: [facility]            Relevant Problems   Cardiac   (+) Atrial fibrillation (Multi)   (+) Benign essential HTN   (+) Hyperlipidemia   (+) Murmur      Pulmonary   (+) Asthmatic bronchitis (HHS-HCC)   (+) SOB (shortness of breath) on exertion      Endocrine   (+) Hypothyroidism      Musculoskeletal   (+) Fibromyalgia   (+) Osteoarthritis      ID   (+) Herpes zoster       Clinical information reviewed:   Tobacco  Allergies  Meds  Problems  Med Hx  Surg Hx   Fam Hx  Soc   Hx        NPO Detail:  NPO/Void Status  Date of Last Liquid: 01/02/25  Time of Last Liquid: 0001  Date of Last Solid: 01/02/25  Time of Last Solid: 0001         Physical Exam    Airway  Mallampati: III  TM distance: >3 FB  Neck ROM: full     Cardiovascular   Rhythm: irregular     Dental    Pulmonary - normal exam     Abdominal   (+) obese         Anesthesia Plan    History of general anesthesia?: yes  History of complications of general anesthesia?: no    ASA 3     MAC     The patient is not a current smoker.    intravenous induction   Anesthetic plan and risks discussed with patient.    Plan discussed with CRNA and attending.

## 2025-01-02 NOTE — ANESTHESIA POSTPROCEDURE EVALUATION
Patient: Millie Riley    Procedure Summary       Date: 01/02/25 Room / Location: [facility]    Anesthesia Start: 0830 Anesthesia Stop: 0921    Procedure: TRANSESOPHAGEAL ECHO (SANTOS) W/ POSSIBLE CARDIOVERSION Diagnosis:       Atrial fibrillation (Multi)      Abnormal EKG      New onset atrial fibrillation (Multi)      Nonrheumatic aortic (valve) stenosis      (Afib)    Scheduled Providers: Ludwin Calderon MD Responsible Provider:     Anesthesia Type: Not recorded ASA Status: Not recorded            Anesthesia Type: No value filed.    Vitals Value Taken Time   /91 01/02/25 0927   Temp  01/02/25 1013   Pulse 90 01/02/25 0927   Resp 22 01/02/25 0927   SpO2 99 % 01/02/25 0927       Anesthesia Post Evaluation    Patient location during evaluation: PACU  Patient participation: complete - patient participated  Level of consciousness: awake  Pain score: 2  Pain management: adequate  Multimodal analgesia pain management approach  Airway patency: patent  Two or more strategies used to mitigate risk of obstructive sleep apnea  Cardiovascular status: acceptable  Respiratory status: acceptable  Hydration status: acceptable  Postoperative Nausea and Vomiting: none      No notable events documented.

## 2025-01-02 NOTE — DISCHARGE INSTRUCTIONS
The following recommendations are made for you at time of hospital discharge:  - Please take your home medications as instructed prior to hospitalization.   - The following changes to your home medications have been made during your hospital stay:   --> Continue Eliquis 1 tablet (5 mg) every 12 hours  --> Start Diltiazem 180mg 1 capsule daily   --> Continue lipitor  --> STOP taking aspirin   --> We recommend a sleep study outpatient, a sleep medicine referral has been sent     Instructions from cardiology   DO NOT Stop your blood thinner unless emergency without checking in with your doctor.  No driving, alcohol or making legal decisions for 24 hours.  Follow up with Cardiology outpatient, Dr. Wilkinson Jan 20, 2025 9:20am. [address] [facility] [address] [facility] [id number]     - Please follow-up with your primary care provider within 5-7 days from time of discharge. Please call your PCP's office to schedule an appointment.   - If you experience any worsening symptoms or any new acute concerns arise, please contact your primary care provider to discuss and possibly arrange an appointment. If you cannot get in touch with provider or severe symptoms are present, please return to nearest emergency room/urgent care for evaluation and treatment.

## 2025-01-02 NOTE — ANESTHESIA PREPROCEDURE EVALUATION
Patient: Millie Riley    Procedure Information       Anesthesia Start Date/Time: 01/02/25 0830    Scheduled providers: Ludwin Calderon MD    Procedure: TRANSESOPHAGEAL ECHO (SANTOS) W/ POSSIBLE CARDIOVERSION    Location: [facility]            Relevant Problems   Cardiac   (+) Atrial fibrillation (Multi)   (+) Benign essential HTN   (+) Hyperlipidemia   (+) Murmur      Pulmonary   (+) Asthmatic bronchitis (HHS-HCC)   (+) SOB (shortness of breath) on exertion      Endocrine   (+) Hypothyroidism      Musculoskeletal   (+) Fibromyalgia   (+) Osteoarthritis      ID   (+) Herpes zoster       Clinical information reviewed:   Tobacco  Allergies  Meds  Problems  Med Hx  Surg Hx   Fam Hx  Soc   Hx        NPO Detail:  NPO/Void Status  Date of Last Liquid: 01/02/25  Time of Last Liquid: 0001  Date of Last Solid: 01/02/25  Time of Last Solid: 0001         Physical Exam    Airway  Mallampati: III  TM distance: >3 FB     Cardiovascular   Rhythm: irregular     Dental    Pulmonary - normal exam     Abdominal   (+) obese         Anesthesia Plan    History of general anesthesia?: yes  History of complications of general anesthesia?: no    ASA 3     MAC     The patient is not a current smoker.    intravenous induction   Anesthetic plan and risks discussed with patient.    Plan discussed with CRNA and attending.

## 2025-01-02 NOTE — NURSING NOTE
1305 Discharge instructions reviewed with pt. All questions and concerns addressed. IV removed. Medications provided by outpatient pharmacy.

## 2025-01-02 NOTE — PROGRESS NOTES
"Subjective Data:  Was in afib with rvr overnight and felt sob  She underwent successful brock/dcv and currently she is in NSR  No chest pain or palpitations  No dizziness or syncope  Still has LE edema, bilateral   New to me    Reviewed prior notes, and prior cardiac workup 2 years ago     Objective Data:  Last Recorded Vitals:  Vitals:    01/01/25 1936 01/02/25 0423 01/02/25 0600 01/02/25 0848   BP: 135/78 (!) 154/96 134/77 (!) 202/97   BP Location: Left arm Right arm Right arm    Patient Position: Sitting Sitting Sitting    Pulse: 106 105  98   Resp: 18 18  22   Temp: 36.2 °C (97.2 °F) 35.7 °C (96.3 °F)     TempSrc: Temporal Temporal     SpO2: 93% 94%  97%   Weight:  118 kg (259 lb 14.8 oz) 118 kg (259 lb 14.8 oz)    Height:  1.651 m (5' 5\")         Last Labs:  CBC - 1/2/2025:  6:24 AM  5.4 13.3 276    43.0      CMP - 1/2/2025:  6:24 AM  8.9 6.9 20 --- 0.4   3.9 3.8 23 96      PTT - No results in last year.  _   _ _     BNP   Date/Time Value Ref Range Status   06/13/2022 11:59 AM 26 0 - 99 pg/mL Final     Comment:     .  <100 pg/mL - Heart failure unlikely  100-299 pg/mL - Intermediate probability of acute heart  .               failure exacerbation. Correlate with clinical  .               context and patient history.    >=300 pg/mL - Heart Failure likely. Correlate with clinical  .               context and patient history.   Biotin interference may cause falsely decreased results.   Patients taking a Biotin dose of up to 5 mg/day should   refrain from taking Biotin for 24 hours before sample   collection. Providers may contact their local laboratory   for further information.       HGBA1C   Date/Time Value Ref Range Status   04/03/2024 10:53 AM 5.6 see below % Final     LDLCALC   Date/Time Value Ref Range Status   04/03/2024 10:53 AM 87 <=99 mg/dL Final     Comment:                                 Near   Borderline      AGE      Desirable  Optimal    High     High     Very High     0-19 Y     0 - 109     ---    " "110-129   >/= 130     ----    20-24 Y     0 - 119     ---    120-159   >/= 160     ----      >24 Y     0 -  99   100-129  130-159   160-189     >/=190       VLDL   Date/Time Value Ref Range Status   04/03/2024 10:53 AM 19 0 - 40 mg/dL Final   05/28/2022 08:41 AM 10 0 - 40 mg/dL Final   11/16/2020 09:11 AM 26 0 - 40 mg/dL Final      Last I/O:  I/O last 3 completed shifts:  In: 755 (6.4 mL/kg) [P.O.:750; I.V.:5 (0 mL/kg)]  Out: 400 (3.4 mL/kg) [Urine:400 (0.1 mL/kg/hr)]  Weight: 117.9 kg     Past Cardiology Tests (Last 3 Years):  EKG:  ECG 12 Lead 01/01/2025 (Preliminary)    Echo:  No results found for this or any previous visit from the past 1095 days.    Ejection Fractions:  No results found for: \"EF\"  Cath:  No results found for this or any previous visit from the past 1095 days.    Stress Test:  Nuclear Stress Test 11/04/2022    Cardiac Imaging:  No results found for this or any previous visit from the past 1095 days.      Inpatient Medications:  Scheduled medications   Medication Dose Route Frequency    apixaban  5 mg oral q12h    atorvastatin  20 mg oral Daily    cholecalciferol  2,000 Units oral Daily    DULoxetine  60 mg oral Daily    levothyroxine  25 mcg oral Daily    magnesium sulfate  2 g intravenous Once    metoprolol succinate XL  25 mg oral Daily    nystatin   Topical BID    polyethylene glycol  17 g oral Daily    sennosides-docusate sodium  2 tablet oral BID    triamterene-hydrochlorothiazid  1 tablet oral Daily     PRN medications   Medication    butamben-tetracaine-benzocaine    lidocaine    oxygen    sodium chloride 0.9%     Continuous Medications   Medication Dose Last Rate    oxygen   2 L/min (01/02/25 0849)    sodium chloride 0.9%   50 mL/hr (01/02/25 0906)       Physical Exam:  Vitals:    01/02/25 0848   BP: (!) 202/97   Pulse: 98   Resp: 22   Temp:    SpO2: 97%     Constitutional:       Appearance: Normal appearance. She is normal weight.   HENT:      Head: Normocephalic and atraumatic.   Neck: "      Vascular: No carotid bruit or JVD.   Cardiovascular:      Rate and Rhythm: RRR     Pulses: Normal pulses.      Heart sounds: Normal heart sounds.   Pulmonary:      Effort: Pulmonary effort is normal.      Breath sounds: Normal breath sounds.   Abdominal:      General: Abdomen is flat. Bowel sounds are normal.      Palpations: Abdomen is soft.   Musculoskeletal:      Right lower le+ non Pitting Edema present.      Left lower le+ non Pitting Edema present.   Skin:     General: Skin is warm and dry.   Neurological:      General: No focal deficit present.      Mental Status: She is alert and oriented to person, place, and time. Mental status is at baseline.   Psychiatric:         Mood and Affect: Mood normal.         Behavior: Behavior normal.      Assessment/Plan   79 y.o. female with PMH of hypertension, hyperlipidemia, chronic lymphedema, asthma, and hypothyroidism admitted to [facility] for new onset Afib with RVR as a transfer from [facility]. She reports increasing SOB for approximately 4 days prior to presenting. She denies CP. She has chronic LE edema, but reports it's more than usual. EKG shows atrial fibrillation  bpm. Lab work unremarkable. She had a normal NM stress in 2022. TTE in 2022 showed normal LVEF and mild AS.      New onset atrial fibrillation with RVR s/p successful SANTOS/DCV 25 and currently in NSR   Mild AS  HTN, uncontrolled   HLD  Chronic lymphedema    Plan  -Continue Eliquis 5 mg BID  -Switch to Toprol to Diltiazem   -Continue lipitor  -stop home aspirin   -Will arrange for outpatient follow up   -recommend TTE to eval Aortic stenosis severity while in NSR   -recommend outpatient sleep study     Code Status:  Full Code    I spent  minutes in the professional and overall care of this patient.    High complex       Ludwin Calderon MD

## 2025-01-02 NOTE — H&P
"History Of Present Illness:    Millie Riley is a 79 y.o. female presenting for brock/dcv     Last Recorded Vitals:  Vitals:    01/01/25 1430 01/01/25 1936 01/02/25 0423 01/02/25 0600   BP: 145/83 135/78 (!) 154/96 134/77   BP Location:  Left arm Right arm Right arm   Patient Position: Sitting Sitting Sitting Sitting   Pulse: (!) 120 106 105    Resp: 20 18 18    Temp: 36.1 °C (97 °F) 36.2 °C (97.2 °F) 35.7 °C (96.3 °F)    TempSrc: Temporal Temporal Temporal    SpO2: 92% 93% 94%    Weight:   118 kg (259 lb 14.8 oz) 118 kg (259 lb 14.8 oz)   Height:   1.651 m (5' 5\")        Last Labs:  CBC - 1/2/2025:  6:24 AM  5.4 13.3 276    43.0      CMP - 1/2/2025:  6:24 AM  8.9 6.9 20 --- 0.4   3.9 3.8 23 96      PTT - No results in last year.  _   _ _     BNP   Date/Time Value Ref Range Status   06/13/2022 11:59 AM 26 0 - 99 pg/mL Final     Comment:     .  <100 pg/mL - Heart failure unlikely  100-299 pg/mL - Intermediate probability of acute heart  .               failure exacerbation. Correlate with clinical  .               context and patient history.    >=300 pg/mL - Heart Failure likely. Correlate with clinical  .               context and patient history.   Biotin interference may cause falsely decreased results.   Patients taking a Biotin dose of up to 5 mg/day should   refrain from taking Biotin for 24 hours before sample   collection. Providers may contact their local laboratory   for further information.       Hemoglobin A1C   Date/Time Value Ref Range Status   04/03/2024 10:53 AM 5.6 see below % Final     LDL Calculated   Date/Time Value Ref Range Status   04/03/2024 10:53 AM 87 <=99 mg/dL Final     Comment:                                 Near   Borderline      AGE      Desirable  Optimal    High     High     Very High     0-19 Y     0 - 109     ---    110-129   >/= 130     ----    20-24 Y     0 - 119     ---    120-159   >/= 160     ----      >24 Y     0 -  99   100-129  130-159   160-189     >/=190       VLDL " "  Date/Time Value Ref Range Status   04/03/2024 10:53 AM 19 0 - 40 mg/dL Final   05/28/2022 08:41 AM 10 0 - 40 mg/dL Final   11/16/2020 09:11 AM 26 0 - 40 mg/dL Final      Last I/O:  I/O last 3 completed shifts:  In: 755 (6.4 mL/kg) [P.O.:750; I.V.:5 (0 mL/kg)]  Out: 400 (3.4 mL/kg) [Urine:400 (0.1 mL/kg/hr)]  Weight: 117.9 kg     Past Cardiology Tests (Last 3 Years):  EKG:  No results found for this or any previous visit from the past 1095 days.    Echo:  No results found for this or any previous visit from the past 1095 days.    Ejection Fractions:  No results found for: \"EF\"  Cath:  No results found for this or any previous visit from the past 1095 days.    Stress Test:  Nuclear Stress Test 11/04/2022    Cardiac Imaging:  No results found for this or any previous visit from the past 1095 days.      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    Past Surgical History:  She has a past surgical history that includes Total knee arthroplasty (06/27/2013) and US guided thyroid biopsy (3/10/2014).      Social History:  She reports that she has never smoked. She has never used smokeless tobacco. She reports current alcohol use. She reports that she does not use drugs.    Family History:  No family history on file.     Allergies:  Patient has no known allergies.    Inpatient " Medications:  Scheduled medications   Medication Dose Route Frequency    apixaban  5 mg oral q12h    atorvastatin  20 mg oral Daily    cholecalciferol  2,000 Units oral Daily    DULoxetine  60 mg oral Daily    levothyroxine  25 mcg oral Daily    metoprolol succinate XL  25 mg oral Daily    nystatin   Topical BID    polyethylene glycol  17 g oral Daily    sennosides-docusate sodium  2 tablet oral BID    triamterene-hydrochlorothiazid  1 tablet oral Daily     PRN medications   Medication     Continuous Medications   Medication Dose Last Rate     Outpatient Medications:  Current Outpatient Medications   Medication Instructions    ascorbic acid, vitamin C, 500 mg capsule oral, Daily RT    aspirin 81 mg, oral, 2 times daily    atorvastatin (LIPITOR) 20 mg, oral, Daily    biotin 10,000 mcg capsule oral, Daily RT    calcitriol (ROCALTROL) 0.25 mcg, oral, Daily RT    cholecalciferol (VITAMIN D-3) 2,000 Units, oral, Daily RT    DULoxetine (CYMBALTA) 60 mg, oral, Daily    levothyroxine (SYNTHROID, LEVOXYL) 25 mcg, oral, Daily    multivitamin (Daily Multi-Vitamin) tablet 1 tablet, oral, Daily    triamcinolone (Kenalog) 0.1 % cream Topical, 2 times daily    triamterene-hydrochlorothiazid (Dyazide) 37.5-25 mg capsule 1 capsule, oral, Daily       Physical Exam:  irr     Assessment/Plan   Proceed with brock/dcv  Peripheral IV 12/31/24 18 G Left;Proximal;Anterior Antecubital (Active)   Site Assessment Clean;Dry;Intact 01/01/25 2104   Line Status Flushed;Saline locked;No blood return 01/01/25 2104   Dressing Status Clean;Dry 01/01/25 2104   Number of days: 2       Code Status:  Full Code    I spent  minutes in the professional and overall care of this patient.        Ludwin Calderon MD

## 2025-01-02 NOTE — ANESTHESIA PREPROCEDURE EVALUATION
Patient: Millie Riley    Procedure Information       Anesthesia Start Date/Time: 01/02/25 0830    Scheduled providers: Ludwin Calderon MD    Procedure: TRANSESOPHAGEAL ECHO (SANTOS) W/ POSSIBLE CARDIOVERSION    Location: [facility]            Relevant Problems   Cardiac   (+) Atrial fibrillation (Multi)   (+) Benign essential HTN   (+) Hyperlipidemia   (+) Murmur      Pulmonary   (+) Asthmatic bronchitis (HHS-HCC)   (+) SOB (shortness of breath) on exertion      Endocrine   (+) Hypothyroidism      Musculoskeletal   (+) Fibromyalgia   (+) Osteoarthritis      ID   (+) Herpes zoster       Clinical information reviewed:   Tobacco  Allergies  Meds  Problems  Med Hx  Surg Hx   Fam Hx  Soc   Hx        NPO Detail:  NPO/Void Status  Date of Last Liquid: 01/02/25  Time of Last Liquid: 0001  Date of Last Solid: 01/02/25  Time of Last Solid: 0001         PHYSICAL EXAM    Anesthesia Plan

## 2025-01-02 NOTE — PROGRESS NOTES
01/02/25 0800   Discharge Planning   Living Arrangements Alone   Support Systems Children;Family members   Assistance Needed A&Ox3, indepedent with ADLs, no DME, room air at baseline-currently room air, drives. PCP Dr. [address]alia [address]   Type of Residence Private residence   Number of Stairs Within Residence 12   Do you have animals or pets at home? No   Home or Post Acute Services None   Expected Discharge Disposition Home  (TBD. Patient may need new anticoagulant (Eliquis?) at dc pending TTE and possible cardioversion)   Does the patient need discharge transport arranged? No   Financial Resource Strain   How hard is it for you to pay for the very basics like food, housing, medical care, and heating? Not hard        01/02/25 1100   Discharge Planning   Expected Discharge Disposition Home  (New Eliquis with meds to beds. $569 per fill (first mo. free) [facility]   Jan 20, 2025 9:20am  with Dr Wilkinson - they will try to arrange eliquis payment assist. Pt can arrange ride home when dc completed.)

## 2025-01-02 NOTE — ANESTHESIA PREPROCEDURE EVALUATION
Patient: Millie Riley    Procedure Information       Anesthesia Start Date/Time: 01/02/25 0830    Scheduled providers: Ludwin Calderon MD    Procedure: TRANSESOPHAGEAL ECHO (SANTOS) W/ POSSIBLE CARDIOVERSION    Location: [facility]            Relevant Problems   Cardiac   (+) Atrial fibrillation (Multi)   (+) Benign essential HTN   (+) Hyperlipidemia   (+) Murmur      Pulmonary   (+) Asthmatic bronchitis (HHS-HCC)   (+) SOB (shortness of breath) on exertion      Endocrine   (+) Hypothyroidism      Musculoskeletal   (+) Fibromyalgia   (+) Osteoarthritis      ID   (+) Herpes zoster       Clinical information reviewed:   Tobacco  Allergies  Meds  Problems  Med Hx  Surg Hx   Fam Hx  Soc   Hx        NPO Detail:  NPO/Void Status  Date of Last Liquid: 01/02/25  Time of Last Liquid: 0001  Date of Last Solid: 01/02/25  Time of Last Solid: 0001         Physical Exam    Airway  Mallampati: III  TM distance: >3 FB  Neck ROM: full     Cardiovascular - normal exam     Dental    Pulmonary    Abdominal   (+) obese         Anesthesia Plan

## 2025-01-02 NOTE — ANESTHESIA PREPROCEDURE EVALUATION
Patient: Millie Riley    Procedure Information       Date/Time: 01/02/25 0900    Scheduled providers: Ludwin Calderon MD    Procedure: TRANSESOPHAGEAL ECHO (SANTOS) W/ POSSIBLE CARDIOVERSION    Location: [facility]            Relevant Problems   Cardiac   (+) Atrial fibrillation (Multi)   (+) Benign essential HTN   (+) Hyperlipidemia   (+) Murmur      Pulmonary   (+) Asthmatic bronchitis (HHS-HCC)   (+) SOB (shortness of breath) on exertion      Endocrine   (+) Hypothyroidism      Musculoskeletal   (+) Fibromyalgia   (+) Osteoarthritis      ID   (+) Herpes zoster       Clinical information reviewed:    Allergies  Meds  Problems              NPO Detail:  No data recorded     Physical Exam    Airway  Mallampati: III  TM distance: >3 FB     Cardiovascular   Rhythm: irregular     Dental    Pulmonary - normal exam     Abdominal   (+) obese         Anesthesia Plan    History of general anesthesia?: yes  History of complications of general anesthesia?: no    ASA 3     MAC     The patient is not a current smoker.    intravenous induction   Anesthetic plan and risks discussed with patient.    Plan discussed with CRNA and attending.

## 2025-01-03 ENCOUNTER — PATIENT OUTREACH (OUTPATIENT)
Dept: PRIMARY CARE | Facility: CLINIC | Age: 80
End: 2025-01-03
Payer: MEDICARE

## 2025-01-03 NOTE — SIGNIFICANT EVENT
Follow Up Phone Call    Outgoing phone call    Spoke to: Millie Riley Relationship:self   Phone number: [phone]      Outcome: contacted patient/ family   No chief complaint on file.         Diagnosis:Not applicable    States she is feeling better. No further questions or concerns.

## 2025-01-06 NOTE — DOCUMENTATION CLARIFICATION NOTE
"    PATIENT:               ALAN SINGER  ACCT #:                  [id number]  MRN:                       [id number]  :                       1945  ADMIT DATE:       2024 11:28 PM  DISCH DATE:        2025 4:32 PM  RESPONDING PROVIDER #:        [id number]          PROVIDER RESPONSE TEXT:    Acute Diastolic Congestive Heart Failure    CDI QUERY TEXT:    Clarification        Instruction:    Based on your assessment of the patient and the clinical information, please provide the requested documentation by clicking on the appropriate radio button and enter any additional information if prompted.    Question: Please further clarify the type and acuity of congestive heart failure    When answering this query, please exercise your independent professional judgment. The fact that a question is being asked, does not imply that any particular answer is desired or expected.    The patient's clinical indicators include:  Clinical Information: \"transferred to [facility]  for new onset Afib with RVR and CHF. \" per H/P    Clinical Indicators:    \"New Onset A-fib with RVR  New-Onset Congestive Heart Failure\" per H/P    \"Alan first noticed shortness of breath 4 days ago that was worsened when laying flat and with exertion\" per H/P    \" Leg swelling is slightly worse than at baseline with her chronic lymphedema\" per H/P    CONCLUSIONS:  1. Poorly visualized anatomical structures due to suboptimal image quality.  2. The left ventricular systolic function is normal, with a visually estimated ejection fraction of 55-60%.  3. Spectral Doppler shows a Grade II (pseudonormal pattern) of left ventricular diastolic filling. per TTE 25    3+ RLE and LLE edema per nursing assessment 25 at 2102    IMPRESSION: 1.  New pulmonary venous congestion and congestive and/or inflammatory interstitial prominence including peribronchial cuffing 2.  New probable at least small pleural effusions and mildly hypoexpanded, hypoaerated " "subjacent lung bases especially left lower lobe where there is suspected small patchy consolidation per CXR from H/P    Treatment:    \"NPO after midnight for SANTOS/DCC tomorrow....IV Lasix today....\" per cardiology consult    IV Lasix 20mg  not given patient/family refused 1/1/25 at 0042 per MAR    IV Lasix 20mg  1/1/25 at 0940 per MAR        Risk Factors:    \"New onset atrial fibrillation with RVR  Mild AS  HTN\" per cardiology consult  Options provided:  -- Acute Diastolic Congestive Heart Failure  -- Other - I will add my own diagnosis  -- Refer to Clinical Documentation Reviewer    Query created by: Rebeca Mcgee on 1/5/2025 5:52 PM      Electronically signed by:  KATY MITCHELL DO 1/6/2025 8:42 AM          "

## 2025-01-08 LAB
ATRIAL RATE: 86 BPM
P AXIS: 100 DEGREES
P OFFSET: 160 MS
P ONSET: 103 MS
PR INTERVAL: 214 MS
Q ONSET: 210 MS
QRS COUNT: 15 BEATS
QRS DURATION: 96 MS
QT INTERVAL: 404 MS
QTC CALCULATION(BAZETT): 483 MS
QTC FREDERICIA: 455 MS
R AXIS: 200 DEGREES
T AXIS: 114 DEGREES
T OFFSET: 412 MS
VENTRICULAR RATE: 86 BPM

## 2025-01-13 ENCOUNTER — APPOINTMENT (OUTPATIENT)
Dept: PRIMARY CARE | Facility: CLINIC | Age: 80
End: 2025-01-13
Payer: MEDICARE

## 2025-01-13 VITALS
HEIGHT: 65 IN | DIASTOLIC BLOOD PRESSURE: 70 MMHG | HEART RATE: 106 BPM | BODY MASS INDEX: 42.65 KG/M2 | SYSTOLIC BLOOD PRESSURE: 124 MMHG | WEIGHT: 256 LBS

## 2025-01-13 DIAGNOSIS — I48.0 PAROXYSMAL ATRIAL FIBRILLATION (MULTI): ICD-10-CM

## 2025-01-13 DIAGNOSIS — E66.01 MORBID OBESITY (MULTI): ICD-10-CM

## 2025-01-13 DIAGNOSIS — E78.49 OTHER HYPERLIPIDEMIA: ICD-10-CM

## 2025-01-13 DIAGNOSIS — I10 BENIGN ESSENTIAL HTN: ICD-10-CM

## 2025-01-13 DIAGNOSIS — Z09 HOSPITAL DISCHARGE FOLLOW-UP: Primary | ICD-10-CM

## 2025-01-13 DIAGNOSIS — F41.9 ANXIETY: ICD-10-CM

## 2025-01-13 DIAGNOSIS — M79.7 FIBROMYALGIA: ICD-10-CM

## 2025-01-13 DIAGNOSIS — E03.9 ACQUIRED HYPOTHYROIDISM: ICD-10-CM

## 2025-01-13 DIAGNOSIS — I89.0 LYMPHEDEMA: ICD-10-CM

## 2025-01-13 PROCEDURE — 1159F MED LIST DOCD IN RCRD: CPT | Performed by: INTERNAL MEDICINE

## 2025-01-13 PROCEDURE — 1111F DSCHRG MED/CURRENT MED MERGE: CPT | Performed by: INTERNAL MEDICINE

## 2025-01-13 PROCEDURE — 1124F ACP DISCUSS-NO DSCNMKR DOCD: CPT | Performed by: INTERNAL MEDICINE

## 2025-01-13 PROCEDURE — 3074F SYST BP LT 130 MM HG: CPT | Performed by: INTERNAL MEDICINE

## 2025-01-13 PROCEDURE — 3078F DIAST BP <80 MM HG: CPT | Performed by: INTERNAL MEDICINE

## 2025-01-13 PROCEDURE — 99495 TRANSJ CARE MGMT MOD F2F 14D: CPT | Performed by: INTERNAL MEDICINE

## 2025-01-13 PROCEDURE — 1160F RVW MEDS BY RX/DR IN RCRD: CPT | Performed by: INTERNAL MEDICINE

## 2025-01-13 PROCEDURE — 1036F TOBACCO NON-USER: CPT | Performed by: INTERNAL MEDICINE

## 2025-01-13 RX ORDER — CALCITRIOL 0.25 UG/1
0.25 CAPSULE ORAL
Qty: 90 CAPSULE | Refills: 1 | Status: CANCELLED | OUTPATIENT
Start: 2025-01-13

## 2025-01-13 NOTE — PROGRESS NOTES
"Subjective   Patient ID: Millie Riley is a 79 y.o. female who presents for Follow-up.  Accompanied by daughter-in-law Massiel who is in the room with patient's consent to provide support and coordinate care.  HPI   Millie is a 79-year-old  female who comes today for follow-up after recent hospitalization from 12/31 - 1/2 for new onset atrial fibrillation with RVR, status post cardioversion.  Patient was treated with diltiazem and started on Eliquis.  She was advised to consult sleep medicine to rule out sleep apnea.  Medications were reviewed and updated in the medical record, patient is compliant with them and reports no significant side effects.  She has a follow-up appointment with cardiology next week.  Patient claims she had a \"virus\" and had a fever for 2 days but symptoms have resolved since.  Patient claims she was increasingly short of breath and had palpitations and cough when she went to the ED and subsequently hospitalized in [facility].  Currently, patient is ambulating without any aid or assistance and she denies fever, chills, chest pain, any significant shortness of breath, palpitations, dizziness, syncope, abdominal pain, nausea, vomiting, diarrhea, melena, rectal bleeding, dysuria or hematuria.  Review of Systems  As per [facility].  Patient lives alone but her family lives close by.  Objective   /70 (BP Location: Right arm, Patient Position: Sitting, BP Cuff Size: Large adult)   Pulse 106   Ht 1.651 m (5' 5\")   Wt 116 kg (256 lb)   BMI 42.60 kg/m²  POX 95% R/A    Physical Exam  General - well developed, well appearing, morbidly obese, elderly  female in no acute respiratory distress  Eyes - normal sclera and conjunctiva with no pallor or icterus, normal extraocular movements  ENT - normal external auditory canals and tympanic membranes, throat clear with no exudates  Neck - No JVD, thyromegaly or lymphadenopathy  Lungs - no respiratory distress and lungs clear to " auscultation bilaterally  Heart - normal S1, S2 with irregular rhythm, tachycardia and systolic murmur heard in the left sternal border  Abdomen - soft, nontender with no masses or organomegaly  Extremities -bilateral lower extremity edema, chronic  Neuro - grossly normal neuro exam with no focal neuro deficits  Psych - normal mental status, mood and affect   Skin -erythematous and scaly skin noted on bilateral shins  MSK - normal gait with grossly normal ROM of major joints  Assessment/Plan        1.  Hospital discharge follow-up-patient was recently hospitalized for atrial fibrillation with rapid ventricular rate, she has undergone a cardioversion and has been started on Eliquis and diltiazem, patient will follow-up with cardiology next week  Currently patient denies any chest pain or any significant shortness of breath/palpitations-pulse ox is okay as well and I have advised patient that if she develops any chest pain, increased shortness of breath or palpitations that she must go to the ED  2.  Paroxysmal atrial fibrillation-patient has been started on Eliquis, she is also on diltiazem and will follow-up with cardiology next week as scheduled  3.  Hypertension-BP is controlled and patient will continue current treatment  4.  Hyperlipidemia-patient will continue statin therapy, fasting lipids will be checked at follow-up  5.  Hypothyroidism-patient is on levothyroxine, recent TSH was normal  6.  Morbid obesity-patient has been referred to sleep medicine to rule out sleep apnea  Hemoglobin A1c done last year was normal  7.  Fibromyalgia, anxiety-patient is on duloxetine  8.  Bilateral lower extremity edema-chronic and secondary to lymphedema, patient is on diuretic therapy with Maxzide  Plans discussed with patient and daughter-in-law Massiel at length.  Follow-up in 2 months for Medicare annual wellness exam.  40 minutes spent rooming the patient, reviewing records, eliciting history, examining patient,  counseling, coordination of care and in documentation.  This note was partially generated using the Dragon voice recognition system. There may be some incorrect words, spelling and punctuation errors that were not corrected prior to committing the note to the patient's medical record.

## 2025-01-15 ENCOUNTER — PATIENT OUTREACH (OUTPATIENT)
Dept: PRIMARY CARE | Facility: CLINIC | Age: 80
End: 2025-01-15
Payer: MEDICARE

## 2025-01-15 NOTE — PROGRESS NOTES
Call regarding appt with PCP after hospitalization. At time of outreach call, the patient stated she still has some SOB, but it is greatly improved. No questions regarding appt. Next follow up is with cardiology on 1/20/2025.

## 2025-01-20 ENCOUNTER — OFFICE VISIT (OUTPATIENT)
Dept: CARDIOLOGY | Facility: CLINIC | Age: 80
End: 2025-01-20
Payer: MEDICARE

## 2025-01-20 ENCOUNTER — APPOINTMENT (OUTPATIENT)
Dept: CARDIOLOGY | Facility: HOSPITAL | Age: 80
End: 2025-01-20
Payer: MEDICARE

## 2025-01-20 VITALS
OXYGEN SATURATION: 92 % | HEART RATE: 111 BPM | DIASTOLIC BLOOD PRESSURE: 83 MMHG | WEIGHT: 257.6 LBS | HEIGHT: 65 IN | BODY MASS INDEX: 42.92 KG/M2 | SYSTOLIC BLOOD PRESSURE: 125 MMHG

## 2025-01-20 DIAGNOSIS — I48.91 ATRIAL FIBRILLATION (MULTI): Primary | ICD-10-CM

## 2025-01-20 DIAGNOSIS — I48.91 ATRIAL FIBRILLATION, UNSPECIFIED TYPE (MULTI): ICD-10-CM

## 2025-01-20 DIAGNOSIS — I50.32 CHRONIC HEART FAILURE WITH PRESERVED EJECTION FRACTION: ICD-10-CM

## 2025-01-20 PROCEDURE — 1159F MED LIST DOCD IN RCRD: CPT | Performed by: INTERNAL MEDICINE

## 2025-01-20 PROCEDURE — 3074F SYST BP LT 130 MM HG: CPT | Performed by: INTERNAL MEDICINE

## 2025-01-20 PROCEDURE — 1111F DSCHRG MED/CURRENT MED MERGE: CPT | Performed by: INTERNAL MEDICINE

## 2025-01-20 PROCEDURE — RXMED WILLOW AMBULATORY MEDICATION CHARGE

## 2025-01-20 PROCEDURE — 1036F TOBACCO NON-USER: CPT | Performed by: INTERNAL MEDICINE

## 2025-01-20 PROCEDURE — 99215 OFFICE O/P EST HI 40 MIN: CPT | Performed by: INTERNAL MEDICINE

## 2025-01-20 PROCEDURE — 93005 ELECTROCARDIOGRAM TRACING: CPT | Performed by: INTERNAL MEDICINE

## 2025-01-20 PROCEDURE — 3079F DIAST BP 80-89 MM HG: CPT | Performed by: INTERNAL MEDICINE

## 2025-01-20 PROCEDURE — 99205 OFFICE O/P NEW HI 60 MIN: CPT | Performed by: INTERNAL MEDICINE

## 2025-01-20 RX ORDER — SPIRONOLACTONE 25 MG/1
25 TABLET ORAL DAILY
Qty: 90 TABLET | Refills: 3 | Status: SHIPPED | OUTPATIENT
Start: 2025-01-20 | End: 2026-01-20

## 2025-01-20 RX ORDER — TORSEMIDE 20 MG/1
20 TABLET ORAL DAILY
Qty: 90 TABLET | Refills: 3 | Status: SHIPPED | OUTPATIENT
Start: 2025-01-20 | End: 2026-01-20

## 2025-01-20 ASSESSMENT — PATIENT HEALTH QUESTIONNAIRE - PHQ9
9. THOUGHTS THAT YOU WOULD BE BETTER OFF DEAD, OR OF HURTING YOURSELF: NOT AT ALL
6. FEELING BAD ABOUT YOURSELF - OR THAT YOU ARE A FAILURE OR HAVE LET YOURSELF OR YOUR FAMILY DOWN: NEARLY EVERY DAY
7. TROUBLE CONCENTRATING ON THINGS, SUCH AS READING THE NEWSPAPER OR WATCHING TELEVISION: NOT AT ALL
1. LITTLE INTEREST OR PLEASURE IN DOING THINGS: NOT AT ALL
10. IF YOU CHECKED OFF ANY PROBLEMS, HOW DIFFICULT HAVE THESE PROBLEMS MADE IT FOR YOU TO DO YOUR WORK, TAKE CARE OF THINGS AT HOME, OR GET ALONG WITH OTHER PEOPLE: SOMEWHAT DIFFICULT
2. FEELING DOWN, DEPRESSED OR HOPELESS: NEARLY EVERY DAY
5. POOR APPETITE OR OVEREATING: NOT AT ALL
3. TROUBLE FALLING OR STAYING ASLEEP OR SLEEPING TOO MUCH: NOT AT ALL
4. FEELING TIRED OR HAVING LITTLE ENERGY: NEARLY EVERY DAY
SUM OF ALL RESPONSES TO PHQ9 QUESTIONS 1 AND 2: 3
8. MOVING OR SPEAKING SO SLOWLY THAT OTHER PEOPLE COULD HAVE NOTICED. OR THE OPPOSITE, BEING SO FIGETY OR RESTLESS THAT YOU HAVE BEEN MOVING AROUND A LOT MORE THAN USUAL: NOT AT ALL

## 2025-01-20 ASSESSMENT — ENCOUNTER SYMPTOMS
LOSS OF SENSATION IN FEET: 0
DEPRESSION: 0
OCCASIONAL FEELINGS OF UNSTEADINESS: 0

## 2025-01-20 ASSESSMENT — COLUMBIA-SUICIDE SEVERITY RATING SCALE - C-SSRS
2. HAVE YOU ACTUALLY HAD ANY THOUGHTS OF KILLING YOURSELF?: NO
1. IN THE PAST MONTH, HAVE YOU WISHED YOU WERE DEAD OR WISHED YOU COULD GO TO SLEEP AND NOT WAKE UP?: NO
6. HAVE YOU EVER DONE ANYTHING, STARTED TO DO ANYTHING, OR PREPARED TO DO ANYTHING TO END YOUR LIFE?: NO

## 2025-01-20 NOTE — PROGRESS NOTES
Referred by Dr. Gillombardo for No chief complaint on file.     History Of Present Illness:    Millie Riley is a 79 y.o. female with complex PMH (detailed below) presenting to outpatient cardiology clinic to establish care after recent hospitalization at [facility] for A-fib with RVR and acute decompensated heart failure.  She underwent SANTOS with cardioversion on 1/2/2025 after which she was discharged home ultimately on Eliquis 5 mg twice daily and diltiazem 190 mg once daily.    She reports to outpatient cardiology clinic reporting some difficulty with ambulation around her home.  She is able to walk only 25-30 steps before becoming significantly dyspneic.  She has had 1 mechanical fall from standing in the last couple years which was uncomplicated, however there is some concern about being committed to lifelong anticoagulation given her issues with ambulation.    She continues to report dyspnea on exertion, palpitations and 3+ pitting edema in the bilateral lower extremities, much of which is new.    Past Medical History:  Hypertension/dyslipidemia  A-fib with RVR  Hypothyroidism  Chronic lymphedema  Asthma  Negative nuclear stress test 11/2022      Review of Systems   Constitutional:  No Weight Change, No Fever, No Chills, No Night Sweats, No Fatigue, No Malaise   ENT/Mouth:  No Hearing Changes, No Ear Pain, No Nasal Congestion, No  Sinus Pain, No Hoarseness, No sore throat, No Rhinorrhea, No Swallowing  Difficulty   Eyes:  No Eye Pain, No Swelling, No Redness, No Foreign Body, No Discharge, No Vision Changes   Cardiovascular:  See HPI   Respiratory:  No Cough, No Sputum, No Wheezing, No Smoke Exposure, No Dyspnea   Gastrointestinal:  No Nausea, No Vomiting, No Diarrhea, No  Constipation, No Pain, No Heartburn, No Anorexia, No Dysphagia, No  Hematochezia, No Melena, No Flatulence, No Jaundice   Genitourinary:  No Dysmenorrhea, No DUB, No Dyspareunia, No Dysuria, No  Urinary Frequency, No Hematuria, No Urinary  Incontinence, No Urgency,  No Flank Pain, No Urinary Flow Changes   Musculoskeletal:  No Arthralgias, No Myalgias, No Joint Swelling, No  Joint Stiffness, No Back Pain, No Neck Pain, No Injury History   Skin:  No Skin Lesions, No Pruritis, No Hair Changes, No Breast/Skin Changes, No Nipple Discharge   Neuro:  No Weakness, No Numbness, No Paresthesias, No Loss of  Consciousness, No Syncope, No Dizziness, No Headache, No Coordination  Changes, No Recent Falls   Psych:  No Anxiety/Panic, No Depression, No Insomnia, No Delusions, No Rumination, No SI/HI/AH/VH, No Social Issues,  No Memory Changes, No Violence/Abuse Hx., No Eating Concerns   Heme/Lymph:  No Bruising, No Bleeding, No Transfusions History, No Lymphadenopathy   Endocrine:  No Polyuria, No Polydipsia, No Temperature Intolerance        Past Medical History:  She has a past medical history of Abdominal pain (06/02/2023), Acute otitis externa (03/27/2024), Atypical chest pain (11/04/2022), Breast pain (03/27/2024), Cellulitis (03/27/2024), Choking (03/27/2024), Choking (03/27/2024), Cough (03/27/2024), COVID-19 (03/27/2024), Disorder (03/27/2024), 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    Past Surgical History:  She has a past surgical history that includes Total knee arthroplasty (06/27/2013) and US guided thyroid biopsy (3/10/2014).      Social History:  She reports that she has never smoked. She has never used smokeless tobacco. She reports current alcohol use. She reports that she does not use drugs.    Family History:  No family history on file.     Allergies:  Patient has no known  "allergies.    Outpatient Medications:  Current Outpatient Medications   Medication Instructions    apixaban (ELIQUIS) 5 mg, oral, Every 12 hours    ascorbic acid, vitamin C, 500 mg capsule Daily RT    atorvastatin (LIPITOR) 20 mg, oral, Daily    biotin 10,000 mcg capsule Daily RT    cholecalciferol (VITAMIN D-3) 2,000 Units, Daily RT    dilTIAZem CD (CARDIZEM CD) 180 mg, oral, Daily    DULoxetine (CYMBALTA) 60 mg, oral, Daily    levothyroxine (SYNTHROID, LEVOXYL) 25 mcg, oral, Daily    multivitamin (Daily Multi-Vitamin) tablet 1 tablet, Daily    spironolactone (ALDACTONE) 25 mg, oral, Daily    torsemide (DEMADEX) 20 mg, oral, Daily    triamcinolone (Kenalog) 0.1 % cream Topical, 2 times daily    triamterene-hydrochlorothiazid (Dyazide) 37.5-25 mg capsule 1 capsule, oral, Daily        Last Recorded Vitals:  Vitals:    01/20/25 1149 01/20/25 1150   BP: 135/88 125/83   BP Location: Left arm Right arm   Patient Position: Sitting Sitting   BP Cuff Size: Large adult Large adult   Pulse: (!) 111    SpO2: 92%    Weight: 117 kg (257 lb 9.6 oz)    Height: 1.651 m (5' 5\")        Physical Exam:  Physical exam  GEN: calm, cooperative, asking appropriate questions  NEURO: Alert and oriented x3, CN2-12 intact, SILT, Moving all extremities without difficulty  HEENT: atraumatic, no goiter, elevated JVD to the angle of the mandible, normal uvula   CARDS: PMI is non-displaced, RRR, no MRG  PULM: CTAB, no wheezes / rales / rhonchi   ABD: soft, non-distended, non-tender, non-tympanitic, BS in all 4 quadrants. No hepatosplenomegaly  : unremarkable  SKIN: healthy appearing, normal skin turgor   EXT: atraumatic chronic lymphedema + pitting edema to the knee bilateraly             Last Labs:  CBC -  Lab Results   Component Value Date    WBC 5.4 01/02/2025    HGB 13.3 01/02/2025    HCT 43.0 01/02/2025    MCV 92 01/02/2025     01/02/2025       CMP -  Lab Results   Component Value Date    CALCIUM 8.9 01/02/2025    PHOS 3.9 01/02/2025 "    PROT 6.9 04/03/2024    ALBUMIN 3.8 01/02/2025    AST 20 04/03/2024    ALT 23 04/03/2024    ALKPHOS 96 04/03/2024    BILITOT 0.4 04/03/2024       LIPID PANEL -   Lab Results   Component Value Date    CHOL 169 04/03/2024    HDL 62.7 04/03/2024    CHHDL 2.7 04/03/2024    VLDL 19 04/03/2024    TRIG 97 04/03/2024    NHDL 106 04/03/2024       RENAL FUNCTION PANEL -   Lab Results   Component Value Date    K 4.0 01/02/2025    PHOS 3.9 01/02/2025       Lab Results   Component Value Date    BNP 26 06/13/2022    HGBA1C 5.6 04/03/2024           Last Cardiology Tests:    ECG:        Echo:  Echo Results:  Transthoracic Echo (TTE) Complete With Contrast 01/02/2025    [facility], [address] [place] [address]  Tel [phone] and Fax [phone]    TRANSTHORACIC ECHOCARDIOGRAM REPORT      Patient Name:       ALAN Penn Physician:    [id number] Ludwin Calderon MD  Study Date:         1/2/2025            Ordering Provider:    [id number] KATY MITCHELL  MRN/PID:            [id number]            Fellow:  Accession#:         [id number]        Nurse:                Danielle Eric  Date of Birth/Age:  1945 / 79      Sonographer:          Winifred olmos                                     ADRIANA  Gender assigned at  F                   Additional Staff:  Birth:  Height:             139.70 cm           Admit Date:           12/31/2024  Weight:             117.48 kg           Admission Status:     Inpatient -  Routine  BSA / BMI:          1.96 m2 / 60.20     Encounter#:           [id number]  kg/m2  Blood Pressure:     134/77 mmHg         Department Location:  [facility]    Study Type:    TRANSTHORACIC ECHO (TTE) COMPLETE  Diagnosis/ICD: Unspecified atrial fibrillation-I48.91; Heart failure,  unspecified-I50.9; Other forms of dyspnea-R06.09  Indication:    Afib, CHF, , Dyspnea on Exertion  CPT Code:      Echo Complete w Full Doppler-98961    Patient  History:  Pertinent History: HTN, Hyperlipidemia, A-Fib, Dyspnea and Chronic Lymphedema.    Study Detail: The following Echo studies were performed: 2D, M-Mode, Doppler and  color flow. Technically challenging study due to body habitus,  patient lying in supine position and prominent lung artifact.  Definity used as a contrast agent for endocardial border  definition. Total contrast used for this procedure was 1.0 mL via  IV push. Unable to obtain suprasternal notch view. The patient was  awake.      PHYSICIAN INTERPRETATION:  Left Ventricle: The left ventricular systolic function is normal, with a visually estimated ejection fraction of 55-60%. The left ventricular cavity size is normal. There is normal septal and normal posterior left ventricular wall thickness. Spectral Doppler shows a Grade II (pseudonormal pattern) of left ventricular diastolic filling.  Left Atrium: The left atrium was not well visualized.  Right Ventricle: The right ventricle was not well visualized. There is normal right ventricular global systolic function.  Right Atrium: The right atrium was not well visualized.  Aortic Valve: The aortic valve is probably trileaflet. There is mild to moderate aortic valve cusp calcification. The aortic valve dimensionless index is 0.83. There is no evidence of aortic valve regurgitation. The peak instantaneous gradient of the aortic valve is 2 mmHg. The mean gradient of the aortic valve is 1 mmHg. Unable to assess the severity of aortic stenosis.  Mitral Valve: The mitral valve is mildly thickened. There is mild mitral annular calcification. There is trace to mild mitral valve regurgitation.  Tricuspid Valve: The tricuspid valve was not well visualized. Tricuspid regurgitation was not assessed.  Pulmonic Valve: The pulmonic valve is not well visualized. There is physiologic pulmonic valve regurgitation.  Pericardium: Trivial pericardial effusion.  Aorta: The aortic root is normal.  Systemic Veins: The  inferior vena cava appears normal in size, with IVC inspiratory collapse greater than 50%.  In comparison to the previous echocardiogram(s): There are no prior studies on this patient for comparison purposes.      CONCLUSIONS:  1. Poorly visualized anatomical structures due to suboptimal image quality.  2. The left ventricular systolic function is normal, with a visually estimated ejection fraction of 55-60%.  3. Spectral Doppler shows a Grade II (pseudonormal pattern) of left ventricular diastolic filling.  4. There is normal right ventricular global systolic function.    QUANTITATIVE DATA SUMMARY:    2D MEASUREMENTS:          Normal Ranges:  IVSd:            0.92 cm  (0.6-1.1cm)  LVPWd:           0.94 cm  (0.6-1.1cm)  LVIDd:           5.23 cm  (3.9-5.9cm)  LVIDs:           2.61 cm  LV Mass Index:   91 g/m2  LVEDV Index:     25 ml/m2  LV % FS          50.2 %      AORTA MEASUREMENTS:         Normal Ranges:  Ao Sinus, d:        2.90 cm (2.1-3.5cm)  Asc Ao, d:          3.60 cm (2.1-3.4cm)      LV SYSTOLIC FUNCTION BY 2D PLANIMETRY (MOD):  Normal Ranges:  EF-A4C View:    56 % (>=55%)  EF-A2C View:    55 %  EF-Biplane:     54 %  EF-Visual:      58 %  LV EF Reported: 58 %      LV DIASTOLIC FUNCTION:            Normal Ranges:  MV Peak E:             0.87 m/s   (0.7-1.2 m/s)  MV Peak A:             0.30 m/s   (0.42-0.7 m/s)  E/A Ratio:             2.85       (1.0-2.2)  MV A Dur:              68.51 msec      MITRAL VALVE:          Normal Ranges:  MV DT:        138 msec (150-240msec)      AORTIC VALVE:                     Normal Ranges:  AoV Vmax:                0.67 m/s (<=1.7m/s)  AoV Peak P.8 mmHg (<20mmHg)  AoV Mean P.0 mmHg (1.7-11.5mmHg)  LVOT Max Kapil:            0.59 m/s (<=1.1m/s)  AoV VTI:                 13.26 cm (18-25cm)  LVOT VTI:                11.06 cm  LVOT Diameter:           1.64 cm  (1.8-2.4cm)  AoV Area, VTI:           1.75 cm2 (2.5-5.5cm2)  AoV Area,Vmax:           1.87  cm2 (2.5-4.5cm2)  AoV Dimensionless Index: 0.83      RIGHT VENTRICLE:  RV s' 0.08 m/s      TRICUSPID VALVE/RVSP:          Normal Ranges:  Peak TR Velocity:     1.93 m/s  RV Syst Pressure:     23 mmHg  (< 30mmHg)  IVC Diam:             1.80 cm      PULMONIC VALVE:          Normal Ranges:  PV Max Kapil:     0.6 m/s  (0.6-0.9m/s)  PV Max P.5 mmHg      AORTA:  Asc Ao Diam 3.63 cm      [id number] Calderon MD  Electronically signed on 2025 at 10:50:16 AM        ** Final (Updated) **      Transesophageal Echo (SANTOS) Complete With Doppler And Color 2025    [facility], [address] [place] [address]  Tel [phone] and Fax [phone]    TRANSESOPHAGEAL ECHOCARDIOGRAM REPORT      Patient Name:       ALAN AYSHA SINGER   Reading Physician:    [id number] Calderon MD  Study Date:         2025            Ordering Provider:    [id number] LATA MEDEROS  MRN/PID:            [id number]            Fellow:  Accession#:         [id number]        Nurse:  Date of Birth/Age:  1945      Sonographer:          Winifred olmos RDCS  Gender assigned at  F                   Additional Staff:  Birth:  Height:             165.10 cm           Admit Date:           2024  Weight:             117.48 kg           Admission Status:     Inpatient -  Routine  BSA / BMI:          2.21 m2 / 43.10     Encounter#:           [id number]  kg/m2  Blood Pressure:     134/77 mmHg         Department Location:    Study Type:    TRANSESOPHAGEAL ECHO (SANTOS) W/ POSSIBLE CARDIOVERSION  Diagnosis/ICD: Unspecified atrial fibrillation-I48.91; Abnormal  electrocardiogram [ECG] [EKG]-R94.31; Nonrheumatic aortic (valve)  stenosis-I35.0  Indication:    Afib, AS, Abn EKG  CPT Code:      SANTOS Complete-62759; Color Doppler-15756; Doppler Limited-97296  Study Detail: The following Echo studies were performed: 2D, Doppler and color  flow. Agitated saline used as a  contrast agent for intraseptal  flow evaluation. The patient was sedated.      PHYSICIAN INTERPRETATION:  SANTOS Details: The SANTOS probe used was [id number]. Technically adequate omniplane transesophageal echocardiogram performed. Agitated saline contrast and color flow Doppler echo was performed to assess for the presence of a patent foramen ovale.  SANTOS Medication: The patient was sedated by Anesthesia; please refer to anesthesia flow sheet for medications used. Total intraservice time for moderate sedation was 15 minutes.  SANTOS Procedure: The probe was passed without difficulty. Complications encountered during procedure: Patient tolerated the procedure well without any apparent complications.  Left Ventricle: The left ventricular systolic function is normal, with a visually estimated ejection fraction of 55-60%. The left ventricular cavity size was not assessed. Left ventricular diastolic filling was not assessed.  Left Atrium: The left atrium enlarged. A bubble study using agitated saline was performed. Bubble study is negative. The left atrial appendage Doppler velocities are normal and there is no thrombus visualized in the left atrial appendage.  Right Ventricle: The right ventricle is normal in size. There is normal right ventricular global systolic function.  Right Atrium: The right atrium enlarged.  Aortic Valve: The aortic valve is trileaflet. There is moderate aortic valve cusp calcification. There is no evidence of aortic valve regurgitation.  Mitral Valve: The mitral valve is mild to moderately thickened. There is mild to moderate mitral annular calcification. The peak instantaneous gradient of the mitral valve is 5 mmHg. There is mild mitral valve regurgitation.  Tricuspid Valve: The tricuspid valve is structurally normal. There is mild tricuspid regurgitation. The Doppler estimated RVSP is slightly elevated at 40.6 mmHg.  Pulmonic Valve: The pulmonic valve is structurally normal. There is physiologic  pulmonic valve regurgitation.  Pericardium: Trivial to small pericardial effusion.  Aorta: The aortic root is normal. The aortic root is at the upper limits of normal size. There is plaque visualized in the ascending aorta, which is classified as a Grade 2 [mild (focal or diffuse) intimal thickening of 2-3 mm] atherosclerosis. There is plaque visualized in the descending aorta which is classified as a Grade 2 [mild (focal or diffuse) intimal thickening of 2-3 mm] atherosclerosis.  Pulmonary Veins: The pulmonary veins appear normal and return normally to the left atrium.      CONCLUSIONS:  1. The left ventricular systolic function is normal, with a visually estimated ejection fraction of 55-60%.  2. There is normal right ventricular global systolic function.  3. Slightly elevated right ventricular systolic pressure.  4. There is moderate aortic valve cusp calcification.  5. There is plaque visualized in the ascending aorta.  6. There is plaque visualized in the descending aorta.    QUANTITATIVE DATA SUMMARY:    LV SYSTOLIC FUNCTION BY 2D PLANIMETRY (MOD):  Normal Ranges:  EF-Visual:      58 %  LV EF Reported: 58 %      MITRAL VALVE:          Normal Ranges:  MV Vmax:      1.13 m/s (<=1.3m/s)  MV peak P.1 mmHg (<5mmHg)  MV mean P.4 mmHg (<2mmHg)  MV VTI:       14.93 cm (10-13cm)      TRICUSPID VALVE/RVSP:          Normal Ranges:  Peak TR Velocity:     3.07 m/s  RV Syst Pressure:     41 mmHg  (< 30mmHg)      AORTA:  Asc Ao Diam 3.87 cm      [id number] Ludwin Calderon MD  Electronically signed on 2025 at 10:58:38 AM        ** Final **       Ejection Fractions:  EF   Date/Time Value Ref Range Status   2025 10:03 AM 58 %        Cath:  No results found for this or any previous visit from the past 365 days.        Stress Test:  Stress Results:  No results found for this or any previous visit from the past 365 days.           Assessment/Plan   This is a 79-year-old female here to establish care after recent  hospitalization for A-fib with RVR necessitating SANTOS and cardioversion.  ECG obtained in the office today shows that she has reverted to atrial fibrillation with ventricular rates around 110 bpm.  She is otherwise normotensive, conversing in complete sentences on room air.  Physical exam is notable for evidence of chronic volume overload, which we will address with escalation of her diuretic regimen.  We discussed the possibility of changing from diltiazem to amiodarone, possibility of referral to electrophysiology for pulmonary vein isolation as well as to structural heart for possible Watchman.  These topics will be discussed again at our next visit in approximately 1 month our hope is that symptoms will considerably improve with volume optimization.  Please see detailed problem based assessment / plan below    # Chronic heart failure with preserved ejection fraction  # Evidence of chronic volume overload  # AFIB   -Continue diltiazem 180 mg once daily  -Continue triamterene hydrochlorothiazide 37.5 mg - 25 mg  -Start spironolactone 25 mg once daily  -Start torsemide 20 mg once daily  -BMP in 1 week  - will readdress: (1) amio v dilt; (2) watchman; (3) PVI at future visits    # Cardiovascular Health Maintenance  - Physical Activity: Encourage 10-15K steps per day  - Healthy Diet: Avoid high fat and high sodium foods (processed meats / fast foods)  --- consider a mediterranean or DASH diet, emphasizing vegetables, fruits, whole grains, lean proteins  - Avoidance of smoking and smoke exposure    Problem List Items Addressed This Visit             ICD-10-CM    Atrial fibrillation (Multi) - Primary I48.91    Relevant Medications    apixaban (Eliquis) 5 mg tablet    Other Relevant Orders    Referral to Clinical Pharmacy    Follow Up In Cardiology    ECG 12 lead (Clinic Performed)     Other Visit Diagnoses         Codes    Chronic heart failure with preserved ejection fraction     I50.32    Relevant Medications     spironolactone (Aldactone) 25 mg tablet    torsemide (Demadex) 20 mg tablet    Other Relevant Orders    Basic metabolic panel    Follow Up In Cardiology                Time Spent: I spent 40 minutes reviewing medical testing, obtaining medical history and counselling and educating on diagnosis and documenting clinical encounter.   C. Barton Gillombardo, MD  Interventional Cardiology  Pager: [phone]

## 2025-01-21 LAB
ATRIAL RATE: 87 BPM
Q ONSET: 210 MS
QRS COUNT: 18 BEATS
QRS DURATION: 94 MS
QT INTERVAL: 352 MS
QTC CALCULATION(BAZETT): 478 MS
QTC FREDERICIA: 432 MS
R AXIS: -10 DEGREES
T AXIS: 86 DEGREES
T OFFSET: 386 MS
VENTRICULAR RATE: 111 BPM

## 2025-01-28 LAB
BUN SERPL-MCNC: 33 MG/DL (ref 7–25)
BUN/CREAT SERPL: 35 (CALC) (ref 6–22)
CALCIUM SERPL-MCNC: 9.4 MG/DL (ref 8.6–10.4)
CHLORIDE SERPL-SCNC: 102 MMOL/L (ref 98–110)
CO2 SERPL-SCNC: 31 MMOL/L (ref 20–32)
CREAT SERPL-MCNC: 0.94 MG/DL (ref 0.6–1)
EGFRCR SERPLBLD CKD-EPI 2021: 62 ML/MIN/1.73M2
GLUCOSE SERPL-MCNC: 101 MG/DL (ref 65–139)
POTASSIUM SERPL-SCNC: 4.9 MMOL/L (ref 3.5–5.3)
SODIUM SERPL-SCNC: 143 MMOL/L (ref 135–146)

## 2025-01-29 ENCOUNTER — PHARMACY VISIT (OUTPATIENT)
Dept: PHARMACY | Facility: CLINIC | Age: 80
End: 2025-01-29
Payer: COMMERCIAL

## 2025-01-31 ENCOUNTER — TELEPHONE (OUTPATIENT)
Dept: CARDIOLOGY | Facility: CLINIC | Age: 80
End: 2025-01-31
Payer: MEDICARE

## 2025-01-31 ENCOUNTER — PATIENT OUTREACH (OUTPATIENT)
Dept: PRIMARY CARE | Facility: CLINIC | Age: 80
End: 2025-01-31
Payer: MEDICARE

## 2025-01-31 NOTE — PROGRESS NOTES
Outreach made for monthly post discharge follow up. At the time of outreach call the patient stated she is feeling much better, but she is still having some SOB. SOB is with exertion such as stair climbing or taking a shower. It is relieved with rest. Advised the patient, she maybe retaining fluids from her CHF. The patient does not have a scale to perform daily weights. Advised the patient if the swelling in her feet and ankles increased or if she became more SOB to go to the ED to be evaluated. The patient called cardiology earlier today regarding her symptoms and is waiting for a response.

## 2025-02-03 ENCOUNTER — TELEPHONE (OUTPATIENT)
Dept: CARDIOLOGY | Facility: CLINIC | Age: 80
End: 2025-02-03
Payer: MEDICARE

## 2025-02-03 DIAGNOSIS — I48.91 NEW ONSET ATRIAL FIBRILLATION (MULTI): Primary | ICD-10-CM

## 2025-02-03 RX ORDER — DILTIAZEM HYDROCHLORIDE 180 MG/1
180 CAPSULE, COATED, EXTENDED RELEASE ORAL DAILY
Qty: 90 CAPSULE | Refills: 3 | Status: SHIPPED | OUTPATIENT
Start: 2025-02-03 | End: 2026-02-03

## 2025-02-10 DIAGNOSIS — I48.91 NEW ONSET ATRIAL FIBRILLATION (MULTI): ICD-10-CM

## 2025-02-10 RX ORDER — DILTIAZEM HYDROCHLORIDE 180 MG/1
180 CAPSULE, COATED, EXTENDED RELEASE ORAL DAILY
Qty: 90 CAPSULE | Refills: 3 | Status: SHIPPED | OUTPATIENT
Start: 2025-02-10 | End: 2026-02-10

## 2025-02-12 ENCOUNTER — APPOINTMENT (OUTPATIENT)
Dept: SLEEP MEDICINE | Facility: CLINIC | Age: 80
End: 2025-02-12
Payer: MEDICARE

## 2025-02-17 NOTE — PROGRESS NOTES
Pharmacist Clinic: Cardiology Management    Millie Riley is a 79 y.o. female was referred to Clinical Pharmacy Team for anticoagulation management.     Referring Provider: Gillombardo, Carl B, MD    THIS IS A NEW PATIENT APPOINTMENT. PATIENT WILL BE ESTABLISHING CARE WITH CLINICAL PHARMACY.    Appointment was completed by the patient who was reached at .    REVIEW OF PAST APPNT (IF APPLICABLE):   N/A    Allergies Reviewed? Yes    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 06/27/2023    Hyperlipidemia     Hypertension     Other conditions influencing health status     Skin Abscess Of Toes    Pain in right axilla 03/27/2024    Personal history of COVID-19 06/15/2022    Personal history of other diseases of the musculoskeletal system and connective tissue     History of bursitis    Personal history of other diseases of the nervous system and sense organs     History of sciatica    Ulcer of ankle (Multi) 03/27/2024       Current Outpatient Medications on File Prior to Visit   Medication Sig Dispense Refill    apixaban (Eliquis) 5 mg tablet Take 1 tablet (5 mg) by mouth every 12 hours. 180 tablet 3    ascorbic acid, vitamin C, 500 mg capsule Take 1 capsule by mouth once daily.      atorvastatin (Lipitor) 20 mg tablet TAKE 1 TABLET BY MOUTH EVERY DAY 90 tablet 1    cholecalciferol (Vitamin D-3) 50 MCG (2000 UT) tablet Take 1 tablet (2,000 Units) by mouth once daily.      dilTIAZem CD (Cardizem CD) 180 mg 24 hr capsule Take 1 capsule (180 mg) by mouth once daily. 90 capsule 3    DULoxetine (Cymbalta) 60 mg DR capsule TAKE 1 CAPSULE BY MOUTH ONCE DAILY. 90 capsule 1    levothyroxine  "(Synthroid, Levoxyl) 25 mcg tablet TAKE 1 TABLET BY MOUTH EVERY DAY 90 tablet 3    multivitamin (Daily Multi-Vitamin) tablet Take 1 tablet by mouth once daily.      spironolactone (Aldactone) 25 mg tablet Take 1 tablet (25 mg) by mouth once daily. 90 tablet 3    torsemide (Demadex) 20 mg tablet Take 1 tablet (20 mg) by mouth once daily. 90 tablet 3    triamcinolone (Kenalog) 0.1 % cream Apply topically 2 times a day. (Patient not taking: Reported on 2/19/2025) 60 g 1    triamterene-hydrochlorothiazid (Dyazide) 37.5-25 mg capsule TAKE 1 CAPSULE BY MOUTH EVERY DAY (Patient not taking: Reported on 2/19/2025) 90 capsule 1    [DISCONTINUED] biotin 10,000 mcg capsule Take by mouth once daily.       No current facility-administered medications on file prior to visit.         RELEVANT LAB RESULTS:  Lab Results   Component Value Date    BILITOT 0.4 04/03/2024    CALCIUM 9.4 01/27/2025    CO2 31 01/27/2025     01/27/2025    CREATININE 0.94 01/27/2025    GLUCOSE 101 01/27/2025    ALKPHOS 96 04/03/2024    K 4.9 01/27/2025    PROT 6.9 04/03/2024     01/27/2025    AST 20 04/03/2024    ALT 23 04/03/2024    BUN 33 (H) 01/27/2025    ANIONGAP 13 01/02/2025    MG 1.91 01/02/2025    PHOS 3.9 01/02/2025    ALBUMIN 3.8 01/02/2025    GFRF 49 (A) 06/05/2023     Lab Results   Component Value Date    TRIG 97 04/03/2024    CHOL 169 04/03/2024    LDLCALC 87 04/03/2024    HDL 62.7 04/03/2024     No results found for: \"BMCBC\", \"CBCDIF\"     PHARMACEUTICAL ASSESSMENT:    MEDICATION RECONCILIATION    Home Pharmacy Reviewed? Yes, describe: [id number], [facility]    Added:  - None    Changed:  - Triamterene-hydrochlorothiazide: patient not longer taking    Removed:  - Biotin 10,000 mcg: patient not taking    Drug Interactions? Yes, describe: Spironolactone and triamterene may enhance hyperkalemic effect of both medications. Patient is no longer taking triamterene-hydrochlorothiazide, is only taking spironolactone.    Medication " Documentation Review Audit       Reviewed by Maisha Petty, PharmD (Pharmacist) on 02/19/25 at 1443      Medication Order Taking? Sig Documenting Provider Last Dose Status   apixaban (Eliquis) 5 mg tablet [id number] Yes Take 1 tablet (5 mg) by mouth every 12 hours. Carl B Gillombardo, MD  Active   ascorbic acid, vitamin C, 500 mg capsule [id number] Yes Take 1 capsule by mouth once daily. Historical Provider, MD Taking Active   atorvastatin (Lipitor) 20 mg tablet [id number] Yes TAKE 1 TABLET BY MOUTH EVERY DAY Azalia Way MD  Active   Discontinued 02/19/25 1440   cholecalciferol (Vitamin D-3) 50 MCG (2000 UT) tablet [id number] Yes Take 1 tablet (2,000 Units) by mouth once daily. Historical Provider, MD Taking Active   dilTIAZem CD (Cardizem CD) 180 mg 24 hr capsule [id number] Yes Take 1 capsule (180 mg) by mouth once daily. Carl B Gillombardo, MD  Active   DULoxetine (Cymbalta) 60 mg DR capsule [id number] Yes TAKE 1 CAPSULE BY MOUTH ONCE DAILY. Azalia Way MD  Active   levothyroxine (Synthroid, Levoxyl) 25 mcg tablet [id number] Yes TAKE 1 TABLET BY MOUTH EVERY DAY Azalia Way MD  Active   multivitamin (Daily Multi-Vitamin) tablet [id number] Yes Take 1 tablet by mouth once daily. Historical Provider, MD Taking Active   spironolactone (Aldactone) 25 mg tablet [id number] Yes Take 1 tablet (25 mg) by mouth once daily. Carl B Gillombardo, MD  Active   torsemide (Demadex) 20 mg tablet [id number] Yes Take 1 tablet (20 mg) by mouth once daily. Carl B Gillombardo, MD  Active   triamcinolone (Kenalog) 0.1 % cream [id number]  Apply topically 2 times a day.   Patient not taking: Reported on 2/19/2025    Azalia Way MD  Active   triamterene-hydrochlorothiazid (Dyazide) 37.5-25 mg capsule [id number]  TAKE 1 CAPSULE BY MOUTH EVERY DAY   Patient not taking: Reported on 2/19/2025    Azalia Way MD  Active                    DISEASE MANAGEMENT ASSESSMENT:     ANTICOAGULATION ASSESSMENT    The 10-year ASCVD  risk score (Hailee STOUT, et al., 2019) is: 30.8%    Values used to calculate the score:      Age: 79 years      Sex: Female      Is Non- : No      Diabetic: No      Tobacco smoker: No      Systolic Blood Pressure: 125 mmHg      Is BP treated: Yes      HDL Cholesterol: 62.7 mg/dL      Total Cholesterol: 169 mg/dL    DIAGNOSIS: prevention of nonvalvular atrial fibrilliation stroke and systemic embolism  - Patient is projected to be on anticoagulation long term; may consider Watchman procedure  - [id number] Score: [5] (only included if diagnosis is atrial fibrillation)   Age: [<65 (0)] [65-74 (+1)] [> 75 (+2)]: 2  Sex: [Male/Female (+1)]: 1  CHF history: [No/Yes(+1)]: 1  Hypertension history: [No/Yes(+1)]: 1  Stroke/TIA/thromboembolism history: [No/Yes(+2)]: 0  Vascular disease history (prior MI, peripheral artery disease, aortic plaque): [No/Yes(+1)]: 0  Diabetes history: [No/Yes(+1)]: 0    CURRENT PHARMACOTHERAPY:   Eliquis 5 mg BID  78 y/o  117 kg  Scr 0.94 mg/dL (1/27/2025)    RELEVANT PAST MEDICAL HISTORY:   A-fib, HTN, prediabetes,  CHF    Affordability/Accessibility: Eliquis creates cost burden for patient  Adherence/Organization: confirms taking Eliquis twice daily (12 AM and 12 pm); patient uses pill box for organization. Denies any missed doses.  Adverse Reactions: no abnormal bruising/bleeding; no dark/tarry stools  Recent Hospitalizations: none since last cardiology appointment  Recent Falls/Trauma: none  Changes in Tobacco or Alcohol Intake:   Tobacco: none, does not use  Alcohol: none, does not use    EDUCATION/COUNSELING:   - Counseled patient on MOA, expectations, duration of therapy, contraindications, administration, and monitoring parameters  - Counseled patient of side effects that are indicative of bleeding such as dark tarry stool, unexplainable bruising, or vomiting up a coffee ground like substance    [facility] Patient Assistance Program (PAP)    Application for program to be  submitted for the following medications: Eliquis    [facility]ermanent Address: [facility]   Prescription Insurance:   Yes   Members of Household: 1   Files Taxes: Yes       Patient will be faxing financial information to pharmacist directly at .    Patient verbally reports monthly or yearly income which is less than 400% federal poverty level    Patient aware this process may take up to 6 weeks.     If approved medication must be filled through [facility] and MEDICATION WILL BE MAILED TO PATIENT.      DISCUSSION/NOTES:   Today's appointment was initial visit to establish care with Clinical Pharmacy. Millie reports no abnormal bruising or bleeding with Eliquis. No hospitalizations or falls since last cardiology appointment.   We will be applying for [facility] for cost assistance with Eliquis. Of note, the prescription is too soon to refill until 3/28/2025, patient is aware we will not hear back regarding approval until that time.  Will follow up in 6 weeks.     ASSESSMENT:    Assessment/Plan   Problem List Items Addressed This Visit       Atrial fibrillation (Multi)     Millie continues on anticoagulation with Eliquis. No dosage adjustment needed for age, weight, and renal function.         Relevant Orders    Referral to Clinical Pharmacy       RECOMMENDATIONS/PLAN:    Continue: Eliquis 5 mg BID  Follow up: 6 weeks    Last Appnt with Referring Provider: 1/20/2025  Next Appnt with Referring Provider: 2/19/2025  Clinical Pharmacist follow up: 3/28/2025  VAF/Application Expiration: Yes    Date: Pending  Type of Encounter: Fito Petty PharmD    Verbal consent to manage patient's drug therapy was obtained from the patient . They were informed they may decline to participate or withdraw from participation in pharmacy services at any time.    Continue all meds under the continuation of care with the referring provider and clinical pharmacy team.

## 2025-02-19 ENCOUNTER — APPOINTMENT (OUTPATIENT)
Dept: PHARMACY | Facility: HOSPITAL | Age: 80
End: 2025-02-19
Payer: MEDICARE

## 2025-02-19 DIAGNOSIS — I48.91 ATRIAL FIBRILLATION (MULTI): ICD-10-CM

## 2025-02-19 NOTE — Clinical Note
Hello Dr. Gillombardo, Today's appointment was initial visit to establish care with Clinical Pharmacy. Millie reports no abnormal bruising or bleeding with Eliquis. No hospitalizations or falls since last cardiology appointment. We will be applying for [employer] for cost assistance with Eliquis. Of note, the prescription is too soon to refill until 3/28/2025, patient is aware we will not hear back regarding approval until that time. Will follow up in 6 weeks.

## 2025-02-19 NOTE — ASSESSMENT & PLAN NOTE
Millie continues on anticoagulation with Eliquis. No dosage adjustment needed for age, weight, and renal function.

## 2025-02-24 ENCOUNTER — APPOINTMENT (OUTPATIENT)
Dept: CARDIOLOGY | Facility: CLINIC | Age: 80
End: 2025-02-24
Payer: MEDICARE

## 2025-03-03 ENCOUNTER — OFFICE VISIT (OUTPATIENT)
Dept: CARDIOLOGY | Facility: CLINIC | Age: 80
End: 2025-03-03
Payer: MEDICARE

## 2025-03-03 VITALS
HEART RATE: 101 BPM | BODY MASS INDEX: 42.55 KG/M2 | HEIGHT: 65 IN | OXYGEN SATURATION: 94 % | DIASTOLIC BLOOD PRESSURE: 75 MMHG | SYSTOLIC BLOOD PRESSURE: 124 MMHG | WEIGHT: 255.4 LBS

## 2025-03-03 DIAGNOSIS — I48.91 ATRIAL FIBRILLATION (MULTI): ICD-10-CM

## 2025-03-03 DIAGNOSIS — E78.49 OTHER HYPERLIPIDEMIA: ICD-10-CM

## 2025-03-03 DIAGNOSIS — I48.91 NEW ONSET ATRIAL FIBRILLATION (MULTI): ICD-10-CM

## 2025-03-03 DIAGNOSIS — I50.32 CHRONIC HEART FAILURE WITH PRESERVED EJECTION FRACTION: ICD-10-CM

## 2025-03-03 PROCEDURE — 99214 OFFICE O/P EST MOD 30 MIN: CPT | Performed by: INTERNAL MEDICINE

## 2025-03-03 PROCEDURE — 1036F TOBACCO NON-USER: CPT | Performed by: INTERNAL MEDICINE

## 2025-03-03 PROCEDURE — 3078F DIAST BP <80 MM HG: CPT | Performed by: INTERNAL MEDICINE

## 2025-03-03 PROCEDURE — 1126F AMNT PAIN NOTED NONE PRSNT: CPT | Performed by: INTERNAL MEDICINE

## 2025-03-03 PROCEDURE — 3074F SYST BP LT 130 MM HG: CPT | Performed by: INTERNAL MEDICINE

## 2025-03-03 PROCEDURE — 1159F MED LIST DOCD IN RCRD: CPT | Performed by: INTERNAL MEDICINE

## 2025-03-03 RX ORDER — TORSEMIDE 20 MG/1
20 TABLET ORAL DAILY
Qty: 90 TABLET | Refills: 3 | Status: SHIPPED | OUTPATIENT
Start: 2025-03-03 | End: 2026-03-03

## 2025-03-03 RX ORDER — DILTIAZEM HYDROCHLORIDE 180 MG/1
180 CAPSULE, COATED, EXTENDED RELEASE ORAL DAILY
Qty: 90 CAPSULE | Refills: 3 | Status: SHIPPED | OUTPATIENT
Start: 2025-03-03 | End: 2026-03-03

## 2025-03-03 RX ORDER — ATORVASTATIN CALCIUM 40 MG/1
40 TABLET, FILM COATED ORAL DAILY
Qty: 90 TABLET | Refills: 3 | Status: SHIPPED | OUTPATIENT
Start: 2025-03-03 | End: 2026-03-03

## 2025-03-03 RX ORDER — SPIRONOLACTONE 25 MG/1
25 TABLET ORAL DAILY
Qty: 90 TABLET | Refills: 3 | Status: SHIPPED | OUTPATIENT
Start: 2025-03-03 | End: 2026-03-03

## 2025-03-03 ASSESSMENT — ENCOUNTER SYMPTOMS
OCCASIONAL FEELINGS OF UNSTEADINESS: 1
LOSS OF SENSATION IN FEET: 0
DEPRESSION: 0

## 2025-03-03 ASSESSMENT — PATIENT HEALTH QUESTIONNAIRE - PHQ9
SUM OF ALL RESPONSES TO PHQ9 QUESTIONS 1 AND 2: 0
2. FEELING DOWN, DEPRESSED OR HOPELESS: NOT AT ALL
1. LITTLE INTEREST OR PLEASURE IN DOING THINGS: NOT AT ALL

## 2025-03-03 ASSESSMENT — PAIN SCALES - GENERAL: PAINLEVEL_OUTOF10: 0-NO PAIN

## 2025-03-03 NOTE — PROGRESS NOTES
Referred by Dr. Gillombardo for Follow-up     History Of Present Illness:    Millie Riley is a 79 y.o. female with complex PMH (detailed below) presenting to outpatient cardiology clinic here for routine follow-up.  She was last seen in the office on 1/20/2025 for her newly diagnosed atrial fibrillation and heart failure with preserved ejection fraction.  We started her on spironolactone and torsemide to assist with volume management, continued her diltiazem and Eliquis without change.    Over the last several weeks the patient has done quite well, reporting improvement in her dyspnea on exertion, and is now able to walk further distances on flat ground at ascend her stairs at home much easier.  She is able to complete her activities of daily living without limitation.    Notably, she has had 8 pounds of weight loss in the last 4 to 5 weeks.        Past Medical History:  Hypertension/dyslipidemia  A-fib with RVR  Hypothyroidism  Chronic lymphedema  Asthma  Negative nuclear stress test 11/2022  Osteoarthritis of the knee    Review of Systems   Constitutional:  No Weight Change, No Fever, No Chills, No Night Sweats, No Fatigue, No Malaise   ENT/Mouth:  No Hearing Changes, No Ear Pain, No Nasal Congestion, No  Sinus Pain, No Hoarseness, No sore throat, No Rhinorrhea, No Swallowing  Difficulty   Eyes:  No Eye Pain, No Swelling, No Redness, No Foreign Body, No Discharge, No Vision Changes   Cardiovascular:  See HPI   Respiratory:  No Cough, No Sputum, No Wheezing, No Smoke Exposure, No Dyspnea   Gastrointestinal:  No Nausea, No Vomiting, No Diarrhea, No  Constipation, No Pain, No Heartburn, No Anorexia, No Dysphagia, No  Hematochezia, No Melena, No Flatulence, No Jaundice   Genitourinary:  No Dysmenorrhea, No DUB, No Dyspareunia, No Dysuria, No  Urinary Frequency, No Hematuria, No Urinary Incontinence, No Urgency,  No Flank Pain, No Urinary Flow Changes   Musculoskeletal:  No Arthralgias, No Myalgias, No Joint  Swelling, No  Joint Stiffness, No Back Pain, No Neck Pain, No Injury History   Skin:  No Skin Lesions, No Pruritis, No Hair Changes, No Breast/Skin Changes, No Nipple Discharge   Neuro:  No Weakness, No Numbness, No Paresthesias, No Loss of  Consciousness, No Syncope, No Dizziness, No Headache, No Coordination  Changes, No Recent Falls   Psych:  No Anxiety/Panic, No Depression, No Insomnia, No Delusions, No Rumination, No SI/HI/AH/VH, No Social Issues,  No Memory Changes, No Violence/Abuse Hx., No Eating Concerns   Heme/Lymph:  No Bruising, No Bleeding, No Transfusions History, No Lymphadenopathy   Endocrine:  No Polyuria, No Polydipsia, No Temperature Intolerance        Past Medical History:  She has a past medical history of Abdominal pain (06/02/2023), Acute otitis externa (03/27/2024), Atypical chest pain (11/04/2022), Breast pain (03/27/2024), Cellulitis (03/27/2024), Choking (03/27/2024), Choking (03/27/2024), Cough (03/27/2024), COVID-19 (03/27/2024), Disorder (03/27/2024), 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    Past Surgical History:  She has a past surgical history that includes Total knee arthroplasty (06/27/2013) and US guided thyroid biopsy (3/10/2014).      Social History:  She reports that she has never smoked. She has never used smokeless tobacco. She reports current alcohol use. She reports that she does not use drugs.    Family History:  No family history on file.     Allergies:  Patient has no known allergies.    Outpatient Medications:  Current Outpatient Medications   Medication Instructions    ascorbic acid, vitamin C, 500 mg  "capsule 1 capsule, Daily RT    atorvastatin (LIPITOR) 40 mg, oral, Daily    cholecalciferol (VITAMIN D-3) 2,000 Units, Daily RT    dilTIAZem CD (CARDIZEM CD) 180 mg, oral, Daily    DULoxetine (CYMBALTA) 60 mg, oral, Daily    Eliquis 5 mg, oral, Every 12 hours    levothyroxine (SYNTHROID, LEVOXYL) 25 mcg, oral, Daily    multivitamin (Daily Multi-Vitamin) tablet 1 tablet, Daily    spironolactone (ALDACTONE) 25 mg, oral, Daily    torsemide (DEMADEX) 20 mg, oral, Daily        Last Recorded Vitals:  Vitals:    03/03/25 1103   BP: 124/75   BP Location: Left arm   Patient Position: Sitting   BP Cuff Size: Large adult   Pulse: 101   SpO2: 94%   Weight: 116 kg (255 lb 6.4 oz)   Height: 1.651 m (5' 5\")       Physical Exam:  GEN: calm, cooperative, asking appropriate questions  NEURO: Alert and oriented x3, CN2-12 intact, SILT, Moving all extremities without difficulty  HEENT: atraumatic, no goiter, no JVD, normal uvula   CARDS: PMI is non-displaced, RRR, no MRG  PULM: CTAB, no wheezes / rales / rhonchi   ABD: soft, non-distended, non-tender, non-tympanitic, BS in all 4 quadrants. No hepatosplenomegaly  : unremarkable  SKIN: healthy appearing, normal skin turgor   EXT: 2+ pitting and nonpitting edema to the knee in her bilateral lower extremities  VASC: b/l radial pulses are brisk and equal            Last Labs:  CBC -  Lab Results   Component Value Date    WBC 5.4 01/02/2025    HGB 13.3 01/02/2025    HCT 43.0 01/02/2025    MCV 92 01/02/2025     01/02/2025       CMP -  Lab Results   Component Value Date    CALCIUM 9.4 01/27/2025    PHOS 3.9 01/02/2025    PROT 6.9 04/03/2024    ALBUMIN 3.8 01/02/2025    AST 20 04/03/2024    ALT 23 04/03/2024    ALKPHOS 96 04/03/2024    BILITOT 0.4 04/03/2024       LIPID PANEL -   Lab Results   Component Value Date    CHOL 169 04/03/2024    HDL 62.7 04/03/2024    CHHDL 2.7 04/03/2024    VLDL 19 04/03/2024    TRIG 97 04/03/2024    NHDL 106 04/03/2024       RENAL FUNCTION PANEL -   Lab " Results   Component Value Date    K 4.9 01/27/2025    PHOS 3.9 01/02/2025       Lab Results   Component Value Date    BNP 26 06/13/2022    HGBA1C 5.6 04/03/2024           Last Cardiology Tests:    ECG:  Encounter Date: 01/20/25   ECG 12 lead (Clinic Performed)   Result Value    Ventricular Rate 111    Atrial Rate 87    QRS Duration 94    QT Interval 352    QTC Calculation(Bazett) 478    R Axis -10    T Axis 86    QRS Count 18    Q Onset 210    T Offset 386    QTC Fredericia 432    Narrative    Atrial fibrillation with rapid ventricular response  Nonspecific ST and T wave abnormality , probably digitalis effect  Abnormal ECG  When compared with ECG of 02-JAN-2025 09:27,  Atrial fibrillation has replaced Sinus rhythm  QRS axis Shifted right  Criteria for Lateral infarct are no longer Present  Confirmed by Acosta Alcantara ([id number]) on 1/31/2025 10:43:40 AM         Echo:  Echo Results:  Transthoracic Echo (TTE) Complete With Contrast 01/02/2025    [facility], [address] [place] [address]  Tel [phone] and Fax [phone]    TRANSTHORACIC ECHOCARDIOGRAM REPORT      Patient Name:       ALAN SINGER   Reading Physician:    [id number] Ludwin Calderon MD  Study Date:         1/2/2025            Ordering Provider:    [id number] KATY MITCHELL  MRN/PID:            [id number]            Fellow:  Accession#:         [id number]        Nurse:                Danielle Eric  Date of Birth/Age:  1945 / 79      Sonographer:          Winifred olmos                                     ADRIANA  Gender assigned at  F                   Additional Staff:  Birth:  Height:             139.70 cm           Admit Date:           12/31/2024  Weight:             117.48 kg           Admission Status:     Inpatient -  Routine  BSA / BMI:          1.96 m2 / 60.20     Encounter#:           [id number]  kg/m2  Blood Pressure:     134/77 mmHg         Department Location:  [facility]  In[facility]    Study Type:     TRANSTHORACIC ECHO (TTE) COMPLETE  Diagnosis/ICD: Unspecified atrial fibrillation-I48.91; Heart failure,  unspecified-I50.9; Other forms of dyspnea-R06.09  Indication:    Afib, CHF, , Dyspnea on Exertion  CPT Code:      Echo Complete w Full Doppler-78846    Patient History:  Pertinent History: HTN, Hyperlipidemia, A-Fib, Dyspnea and Chronic Lymphedema.    Study Detail: The following Echo studies were performed: 2D, M-Mode, Doppler and  color flow. Technically challenging study due to body habitus,  patient lying in supine position and prominent lung artifact.  Definity used as a contrast agent for endocardial border  definition. Total contrast used for this procedure was 1.0 mL via  IV push. Unable to obtain suprasternal notch view. The patient was  awake.      PHYSICIAN INTERPRETATION:  Left Ventricle: The left ventricular systolic function is normal, with a visually estimated ejection fraction of 55-60%. The left ventricular cavity size is normal. There is normal septal and normal posterior left ventricular wall thickness. Spectral Doppler shows a Grade II (pseudonormal pattern) of left ventricular diastolic filling.  Left Atrium: The left atrium was not well visualized.  Right Ventricle: The right ventricle was not well visualized. There is normal right ventricular global systolic function.  Right Atrium: The right atrium was not well visualized.  Aortic Valve: The aortic valve is probably trileaflet. There is mild to moderate aortic valve cusp calcification. The aortic valve dimensionless index is 0.83. There is no evidence of aortic valve regurgitation. The peak instantaneous gradient of the aortic valve is 2 mmHg. The mean gradient of the aortic valve is 1 mmHg. Unable to assess the severity of aortic stenosis.  Mitral Valve: The mitral valve is mildly thickened. There is mild mitral annular calcification. There is trace to mild mitral valve regurgitation.  Tricuspid Valve: The tricuspid valve was not well  visualized. Tricuspid regurgitation was not assessed.  Pulmonic Valve: The pulmonic valve is not well visualized. There is physiologic pulmonic valve regurgitation.  Pericardium: Trivial pericardial effusion.  Aorta: The aortic root is normal.  Systemic Veins: The inferior vena cava appears normal in size, with IVC inspiratory collapse greater than 50%.  In comparison to the previous echocardiogram(s): There are no prior studies on this patient for comparison purposes.      CONCLUSIONS:  1. Poorly visualized anatomical structures due to suboptimal image quality.  2. The left ventricular systolic function is normal, with a visually estimated ejection fraction of 55-60%.  3. Spectral Doppler shows a Grade II (pseudonormal pattern) of left ventricular diastolic filling.  4. There is normal right ventricular global systolic function.    QUANTITATIVE DATA SUMMARY:    2D MEASUREMENTS:          Normal Ranges:  IVSd:            0.92 cm  (0.6-1.1cm)  LVPWd:           0.94 cm  (0.6-1.1cm)  LVIDd:           5.23 cm  (3.9-5.9cm)  LVIDs:           2.61 cm  LV Mass Index:   91 g/m2  LVEDV Index:     25 ml/m2  LV % FS          50.2 %      AORTA MEASUREMENTS:         Normal Ranges:  Ao Sinus, d:        2.90 cm (2.1-3.5cm)  Asc Ao, d:          3.60 cm (2.1-3.4cm)      LV SYSTOLIC FUNCTION BY 2D PLANIMETRY (MOD):  Normal Ranges:  EF-A4C View:    56 % (>=55%)  EF-A2C View:    55 %  EF-Biplane:     54 %  EF-Visual:      58 %  LV EF Reported: 58 %      LV DIASTOLIC FUNCTION:            Normal Ranges:  MV Peak E:             0.87 m/s   (0.7-1.2 m/s)  MV Peak A:             0.30 m/s   (0.42-0.7 m/s)  E/A Ratio:             2.85       (1.0-2.2)  MV A Dur:              68.51 msec      MITRAL VALVE:          Normal Ranges:  MV DT:        138 msec (150-240msec)      AORTIC VALVE:                     Normal Ranges:  AoV Vmax:                0.67 m/s (<=1.7m/s)  AoV Peak P.8 mmHg (<20mmHg)  AoV Mean P.0 mmHg  (1.7-11.5mmHg)  LVOT Max Kapil:            0.59 m/s (<=1.1m/s)  AoV VTI:                 13.26 cm (18-25cm)  LVOT VTI:                11.06 cm  LVOT Diameter:           1.64 cm  (1.8-2.4cm)  AoV Area, VTI:           1.75 cm2 (2.5-5.5cm2)  AoV Area,Vmax:           1.87 cm2 (2.5-4.5cm2)  AoV Dimensionless Index: 0.83      RIGHT VENTRICLE:  RV s' 0.08 m/s      TRICUSPID VALVE/RVSP:          Normal Ranges:  Peak TR Velocity:     1.93 m/s  RV Syst Pressure:     23 mmHg  (< 30mmHg)  IVC Diam:             1.80 cm      PULMONIC VALVE:          Normal Ranges:  PV Max Kapil:     0.6 m/s  (0.6-0.9m/s)  PV Max P.5 mmHg      AORTA:  Asc Ao Diam 3.63 cm      [id number] Calderon MD  Electronically signed on 2025 at 10:50:16 AM        ** Final (Updated) **      Transesophageal Echo (SANTOS) Complete With Doppler And Color 2025    [facility], [address] [place] [address]  Tel [phone] and Fax [phone]    TRANSESOPHAGEAL ECHOCARDIOGRAM REPORT      Patient Name:       ALAN SINGER   Reading Physician:    [id number] Calderon MD  Study Date:         2025            Ordering Provider:    [id number] LATA MEDEROS  MRN/PID:            [id number]            Fellow:  Accession#:         [id number]        Nurse:  Date of Birth/Age:  1945      Sonographer:          Winifred olmos RDCS  Gender assigned at  F                   Additional Staff:  Birth:  Height:             165.10 cm           Admit Date:           2024  Weight:             117.48 kg           Admission Status:     Inpatient -  Routine  BSA / BMI:          2.21 m2 / 43.10     Encounter#:           [id number]  kg/m2  Blood Pressure:     134/77 mmHg         Department Location:    Study Type:    TRANSESOPHAGEAL ECHO (SANTOS) W/ POSSIBLE CARDIOVERSION  Diagnosis/ICD: Unspecified atrial fibrillation-I48.91; Abnormal  electrocardiogram [ECG] [EKG]-R94.31;  Nonrheumatic aortic (valve)  stenosis-I35.0  Indication:    Afib, AS, Abn EKG  CPT Code:      SANTOS Complete-06655; Color Doppler-64634; Doppler Limited-15627  Study Detail: The following Echo studies were performed: 2D, Doppler and color  flow. Agitated saline used as a contrast agent for intraseptal  flow evaluation. The patient was sedated.      PHYSICIAN INTERPRETATION:  SANTOS Details: The SANTOS probe used was [id number]. Technically adequate omniplane transesophageal echocardiogram performed. Agitated saline contrast and color flow Doppler echo was performed to assess for the presence of a patent foramen ovale.  SANTOS Medication: The patient was sedated by Anesthesia; please refer to anesthesia flow sheet for medications used. Total intraservice time for moderate sedation was 15 minutes.  SANTOS Procedure: The probe was passed without difficulty. Complications encountered during procedure: Patient tolerated the procedure well without any apparent complications.  Left Ventricle: The left ventricular systolic function is normal, with a visually estimated ejection fraction of 55-60%. The left ventricular cavity size was not assessed. Left ventricular diastolic filling was not assessed.  Left Atrium: The left atrium enlarged. A bubble study using agitated saline was performed. Bubble study is negative. The left atrial appendage Doppler velocities are normal and there is no thrombus visualized in the left atrial appendage.  Right Ventricle: The right ventricle is normal in size. There is normal right ventricular global systolic function.  Right Atrium: The right atrium enlarged.  Aortic Valve: The aortic valve is trileaflet. There is moderate aortic valve cusp calcification. There is no evidence of aortic valve regurgitation.  Mitral Valve: The mitral valve is mild to moderately thickened. There is mild to moderate mitral annular calcification. The peak instantaneous gradient of the mitral valve is 5 mmHg. There is mild mitral  valve regurgitation.  Tricuspid Valve: The tricuspid valve is structurally normal. There is mild tricuspid regurgitation. The Doppler estimated RVSP is slightly elevated at 40.6 mmHg.  Pulmonic Valve: The pulmonic valve is structurally normal. There is physiologic pulmonic valve regurgitation.  Pericardium: Trivial to small pericardial effusion.  Aorta: The aortic root is normal. The aortic root is at the upper limits of normal size. There is plaque visualized in the ascending aorta, which is classified as a Grade 2 [mild (focal or diffuse) intimal thickening of 2-3 mm] atherosclerosis. There is plaque visualized in the descending aorta which is classified as a Grade 2 [mild (focal or diffuse) intimal thickening of 2-3 mm] atherosclerosis.  Pulmonary Veins: The pulmonary veins appear normal and return normally to the left atrium.      CONCLUSIONS:  1. The left ventricular systolic function is normal, with a visually estimated ejection fraction of 55-60%.  2. There is normal right ventricular global systolic function.  3. Slightly elevated right ventricular systolic pressure.  4. There is moderate aortic valve cusp calcification.  5. There is plaque visualized in the ascending aorta.  6. There is plaque visualized in the descending aorta.    QUANTITATIVE DATA SUMMARY:    LV SYSTOLIC FUNCTION BY 2D PLANIMETRY (MOD):  Normal Ranges:  EF-Visual:      58 %  LV EF Reported: 58 %      MITRAL VALVE:          Normal Ranges:  MV Vmax:      1.13 m/s (<=1.3m/s)  MV peak P.1 mmHg (<5mmHg)  MV mean P.4 mmHg (<2mmHg)  MV VTI:       14.93 cm (10-13cm)      TRICUSPID VALVE/RVSP:          Normal Ranges:  Peak TR Velocity:     3.07 m/s  RV Syst Pressure:     41 mmHg  (< 30mmHg)      AORTA:  Asc Ao Diam 3.87 cm      [id number] Ludwin Calderon MD  Electronically signed on 2025 at 10:58:38 AM        ** Final **       Ejection Fractions:  EF   Date/Time Value Ref Range Status   2025 10:03 AM 58 %        Cath:  No results  found for this or any previous visit from the past 365 days.        Stress Test:  Stress Results:  No results found for this or any previous visit from the past 365 days.       Cardiac Imaging:  ECG 12 lead (Clinic Performed)  Atrial fibrillation with rapid ventricular response  Nonspecific ST and T wave abnormality , probably digitalis effect  Abnormal ECG  When compared with ECG of 02-JAN-2025 09:27,  Atrial fibrillation has replaced Sinus rhythm  QRS axis Shifted right  Criteria for Lateral infarct are no longer Present  Confirmed by Acosta Alcantara ([id number]) on 1/31/2025 10:43:40 AM      Assessment/Plan   This is a 79-year-old female here for routine 1 month follow-up after establishing with us for A-fib and heart failure with preserved ejection fraction.  Volume status is improved with 8 pounds of weight loss, and she has noted significant clinical improvement now ambulating further distances on flat ground and ascending the stairs much easier at home.  Our plan will be to continue her current medication regimen without change with close follow-up in outpatient cardiology clinic approximately 3 months.  Please see detailed problem based assessment / plan below    # Chronic heart failure with preserved ejection fraction  # Evidence of chronic volume overload  # AFIB   -Continue diltiazem 180 mg once daily  -Continue triamterene hydrochlorothiazide 37.5 mg - 25 mg  -Continue spironolactone 25 mg once daily  -Continue torsemide 20 mg once daily  -Repeat CMP  - will readdress: (1) amio v dilt; (2) watchman; (3) PVI at future visits    # DLD  -Increase atorvastatin to 40 mg, up from 20    # Cardiovascular Health Maintenance  - Physical Activity: Encourage 10-15K steps per day  - Healthy Diet: Avoid high fat and high sodium foods (processed meats / fast foods)  --- consider a mediterranean or DASH diet, emphasizing vegetables, fruits, whole grains, lean proteins  - Avoidance of smoking and smoke exposure    Problem List  Items Addressed This Visit             ICD-10-CM    Hyperlipidemia E78.5    Relevant Medications    atorvastatin (Lipitor) 40 mg tablet    Other Relevant Orders    Comprehensive metabolic panel    Follow Up In Cardiology    Atrial fibrillation (Multi) I48.91    Relevant Medications    dilTIAZem CD (Cardizem CD) 180 mg 24 hr capsule    Other Relevant Orders    Comprehensive metabolic panel    Follow Up In Cardiology     Other Visit Diagnoses         Codes    Chronic heart failure with preserved ejection fraction     I50.32    Relevant Medications    dilTIAZem CD (Cardizem CD) 180 mg 24 hr capsule    spironolactone (Aldactone) 25 mg tablet    torsemide (Demadex) 20 mg tablet    Other Relevant Orders    Comprehensive metabolic panel    Follow Up In Cardiology    New onset atrial fibrillation (Multi)     I48.91    Relevant Medications    dilTIAZem CD (Cardizem CD) 180 mg 24 hr capsule    Other Relevant Orders    Comprehensive metabolic panel    Follow Up In Cardiology                Time Spent: I spent 40 minutes reviewing medical testing, obtaining medical history and counselling and educating on diagnosis and documenting clinical encounter.   C. Barton Gillombardo, MD  Interventional Cardiology  Pager: [phone]

## 2025-03-04 LAB
ALBUMIN SERPL-MCNC: 4.3 G/DL (ref 3.6–5.1)
ALP SERPL-CCNC: 92 U/L (ref 37–153)
ALT SERPL-CCNC: 31 U/L (ref 6–29)
ANION GAP SERPL CALCULATED.4IONS-SCNC: 14 MMOL/L (CALC) (ref 7–17)
AST SERPL-CCNC: 22 U/L (ref 10–35)
BILIRUB SERPL-MCNC: 0.5 MG/DL (ref 0.2–1.2)
BUN SERPL-MCNC: 30 MG/DL (ref 7–25)
CALCIUM SERPL-MCNC: 9.6 MG/DL (ref 8.6–10.4)
CHLORIDE SERPL-SCNC: 101 MMOL/L (ref 98–110)
CO2 SERPL-SCNC: 25 MMOL/L (ref 20–32)
CREAT SERPL-MCNC: 1.09 MG/DL (ref 0.6–1)
EGFRCR SERPLBLD CKD-EPI 2021: 52 ML/MIN/1.73M2
GLUCOSE SERPL-MCNC: 97 MG/DL (ref 65–139)
POTASSIUM SERPL-SCNC: 4.2 MMOL/L (ref 3.5–5.3)
PROT SERPL-MCNC: 7.4 G/DL (ref 6.1–8.1)
SODIUM SERPL-SCNC: 140 MMOL/L (ref 135–146)

## 2025-03-13 ENCOUNTER — TELEPHONE (OUTPATIENT)
Dept: CARDIOLOGY | Facility: CLINIC | Age: 80
End: 2025-03-13
Payer: MEDICARE

## 2025-03-17 ENCOUNTER — APPOINTMENT (OUTPATIENT)
Dept: SLEEP MEDICINE | Facility: CLINIC | Age: 80
End: 2025-03-17
Payer: MEDICARE

## 2025-03-19 ENCOUNTER — TELEPHONE (OUTPATIENT)
Dept: CARDIOLOGY | Facility: CLINIC | Age: 80
End: 2025-03-19
Payer: MEDICARE

## 2025-03-19 NOTE — TELEPHONE ENCOUNTER
I spoke with patient who noted blood in her urine with her first void of the morning.  She has had no recurrence.    She will continue Eliquis uninterrupted and call for any recurrent bleeding in which case we may consider holding a dose and refer to urology.

## 2025-03-20 ENCOUNTER — TELEPHONE (OUTPATIENT)
Dept: CARDIOLOGY | Facility: CLINIC | Age: 80
End: 2025-03-20
Payer: MEDICARE

## 2025-03-20 NOTE — TELEPHONE ENCOUNTER
I spoke with patient who noticed blood with her first void this morning. She is rather concerned. Since this morning she has had no recurrenceor hematuria that she can see.  She was told to hold this mornings dose of Eliquis (not certain by whom).  I instructed her to hold this evenings dose as well and resume Eliquis as prescribed tomorrow.  She will call me on Monday.  Should she continue to have bleeding we can consider changing DOAC +/- referral to urology.  I instructed her to go to UC or ED for any bleeding more significant than she had this morning. She is agreeable and understanding of this plan.

## 2025-03-28 ENCOUNTER — TELEPHONE (OUTPATIENT)
Dept: CARDIOLOGY | Facility: HOSPITAL | Age: 80
End: 2025-03-28

## 2025-03-28 ENCOUNTER — APPOINTMENT (OUTPATIENT)
Dept: PHARMACY | Facility: HOSPITAL | Age: 80
End: 2025-03-28
Payer: MEDICARE

## 2025-03-28 NOTE — TELEPHONE ENCOUNTER
I spoke with patient.  She continues to have blood in am urine only.  Resolves throughout the morning and afternoon.  She is agreeable to continue Eliquis over the weekend.  Will discuss with Dr. Gillombardo on Monday.       ----- Message from Loretta SCHULZ sent at 3/28/2025 12:43 PM EDT -----  Ms. Riley states she still has blood in her urine. Please call [phone].  Thank you

## 2025-03-31 DIAGNOSIS — Z79.01 ANTICOAGULATION MANAGEMENT ENCOUNTER: Primary | ICD-10-CM

## 2025-03-31 DIAGNOSIS — Z51.81 ANTICOAGULATION MANAGEMENT ENCOUNTER: Primary | ICD-10-CM

## 2025-03-31 DIAGNOSIS — I48.91 ATRIAL FIBRILLATION, UNSPECIFIED TYPE (MULTI): ICD-10-CM

## 2025-03-31 NOTE — PROGRESS NOTES
Spoke with Ms. Riley over the phone who expresses a strong preference to move forward with LAAO so that she can get off of systemic AC.     After discussing the patient's clinical scenario with her in detail, as well as reviewing the risks / benefits / alternatives we will move forward with referral to Dr. Hermosillo's structural heart team for possible LAAO.    C. Barton Gillombardo, MD  Staff Interventional Cardiologist  Pager: [phone]

## 2025-04-01 ENCOUNTER — TELEPHONE (OUTPATIENT)
Dept: CARDIOLOGY | Facility: CLINIC | Age: 80
End: 2025-04-01
Payer: MEDICARE

## 2025-04-04 ENCOUNTER — PATIENT OUTREACH (OUTPATIENT)
Dept: PRIMARY CARE | Facility: CLINIC | Age: 80
End: 2025-04-04
Payer: MEDICARE

## 2025-04-04 NOTE — PROGRESS NOTES
Outreach made to wrap up Transitional Care Management (TCM) program. At the time of call, the patient stated she is doing very well. She is going to have watchman device placed in May for her A-fib and the plan is to discontinue her Eliquis following the procedure. No needs for primary care at this time.    The patient has met the target of no readmission for (90) days post hospital discharge and is graduated from the TCM program.

## 2025-04-09 ENCOUNTER — APPOINTMENT (OUTPATIENT)
Dept: SLEEP MEDICINE | Facility: CLINIC | Age: 80
End: 2025-04-09
Payer: MEDICARE

## 2025-04-09 DIAGNOSIS — I50.32 CHRONIC HEART FAILURE WITH PRESERVED EJECTION FRACTION: ICD-10-CM

## 2025-04-09 NOTE — PROGRESS NOTES
The patient called the office today asking if we can increase her diuretic regimen due to peripheral edema    I have placed an order for torsemide 40 mg once daily as well as BMP to be obtained in 1 week.    C. Barton Gillombardo, MD  Staff Interventional Cardiologist  Pager: [phone]

## 2025-04-17 ENCOUNTER — APPOINTMENT (OUTPATIENT)
Dept: PHARMACY | Facility: HOSPITAL | Age: 80
End: 2025-04-17
Payer: MEDICARE

## 2025-04-17 LAB
ANION GAP SERPL CALCULATED.4IONS-SCNC: 14 MMOL/L (CALC) (ref 7–17)
BUN SERPL-MCNC: 28 MG/DL (ref 7–25)
BUN/CREAT SERPL: 26 (CALC) (ref 6–22)
CALCIUM SERPL-MCNC: 9.4 MG/DL (ref 8.6–10.4)
CHLORIDE SERPL-SCNC: 104 MMOL/L (ref 98–110)
CO2 SERPL-SCNC: 25 MMOL/L (ref 20–32)
CREAT SERPL-MCNC: 1.09 MG/DL (ref 0.6–1)
EGFRCR SERPLBLD CKD-EPI 2021: 52 ML/MIN/1.73M2
GLUCOSE SERPL-MCNC: 113 MG/DL (ref 65–99)
POTASSIUM SERPL-SCNC: 4.6 MMOL/L (ref 3.5–5.3)
SODIUM SERPL-SCNC: 143 MMOL/L (ref 135–146)

## 2025-04-18 ENCOUNTER — TELEPHONE (OUTPATIENT)
Dept: CARDIOLOGY | Facility: CLINIC | Age: 80
End: 2025-04-18
Payer: MEDICARE

## 2025-04-23 NOTE — TELEPHONE ENCOUNTER
----- Message from Carl Gillombardo sent at 4/17/2025  4:58 PM EDT -----  Please let the patient know that the recent lab tests are not concerning and are stable compared to previous. No additional studies are needed.    We can discuss this further at our next clinic visit.     For now, we can continue the current outpatient med regimen without change.     Please let me know if there are any questions or concerns.     C. Barton Gillombardo, MD  Staff Interventional Cardiologist  Pager: [phone]    ----- Message -----  From: [employer] Results In  Sent: 4/17/2025   8:04 AM EDT  To: Carl B Gillombardo, MD    
----- Message from Carl Gillombardo sent at 4/17/2025  4:58 PM EDT -----  Please let the patient know that the recent lab tests are not concerning and are stable compared to previous. No additional studies are needed.    We can discuss this further at our next clinic visit.     For now, we can continue the current outpatient med regimen without change.     Please let me know if there are any questions or concerns.     C. Barton Gillombardo, MD  Staff Interventional Cardiologist  Pager: [phone]    ----- Message -----  From: [employer] Results In  Sent: 4/17/2025   8:04 AM EDT  To: Carl B Gillombardo, MD    
----- Message from Carl Gillombardo sent at 4/17/2025  4:58 PM EDT -----  Please let the patient know that the recent lab tests are not concerning and are stable compared to previous. No additional studies are needed.    We can discuss this further at our next clinic visit.     For now, we can continue the current outpatient med regimen without change.     Please let me know if there are any questions or concerns.     C. Barton Gillombardo, MD  Staff Interventional Cardiologist  Pager: [phone]    ----- Message -----  From: [employer] Results In  Sent: 4/17/2025   8:04 AM EDT  To: Carl B Gillombardo, MD    
----- Message from Carl Gillombardo sent at 4/17/2025  4:58 PM EDT -----  Please let the patient know that the recent lab tests are not concerning and are stable compared to previous. No additional studies are needed.    We can discuss this further at our next clinic visit.     For now, we can continue the current outpatient med regimen without change.     Please let me know if there are any questions or concerns.     C. Barton Gillombardo, MD  Staff Interventional Cardiologist  Pager: [phone]    ----- Message -----  From: [employer] Results In  Sent: 4/17/2025   8:04 AM EDT  To: Carl B Gillombardo, MD    
Called pt and no answer and VM full  
Phoned patient and no answer.  Unable to leave message as voicemail box is full.  
Phoned patient and no answer.  Unable to leave voicemail as voicemail box is full.  
Result Communication    Resulted Orders   Basic metabolic panel   Result Value Ref Range    GLUCOSE 113 (H) 65 - 99 mg/dL      Comment:                    Fasting reference interval     For someone without known diabetes, a glucose value  between 100 and 125 mg/dL is consistent with  prediabetes and should be confirmed with a  follow-up test.         UREA NITROGEN (BUN) 28 (H) 7 - 25 mg/dL    CREATININE 1.09 (H) 0.60 - 1.00 mg/dL    EGFR 52 (L) > OR = 60 mL/min/1.73m2    BUN/CREATININE RATIO 26 (H) 6 - 22 (calc)    SODIUM 143 135 - 146 mmol/L    POTASSIUM 4.6 3.5 - 5.3 mmol/L    CHLORIDE 104 98 - 110 mmol/L    CARBON DIOXIDE 25 20 - 32 mmol/L    ELECTROLYTE BALANCE 14 7 - 17 mmol/L (calc)    CALCIUM 9.4 8.6 - 10.4 mg/dL       1:45 PM    Phoned and spoke to patient.  Provided patient results and plan of care per Dr. Gillombardo's notation and patient verbalized understanding.   
dietitian/nutrition services

## 2025-05-12 ENCOUNTER — TELEPHONE (OUTPATIENT)
Dept: CARDIOLOGY | Facility: HOSPITAL | Age: 80
End: 2025-05-12

## 2025-05-12 DIAGNOSIS — I48.91 ATRIAL FIBRILLATION (MULTI): ICD-10-CM

## 2025-05-12 PROCEDURE — RXMED WILLOW AMBULATORY MEDICATION CHARGE

## 2025-05-13 ENCOUNTER — PHARMACY VISIT (OUTPATIENT)
Dept: PHARMACY | Facility: CLINIC | Age: 80
End: 2025-05-13

## 2025-05-13 NOTE — PROGRESS NOTES
Pharmacist Clinic: Cardiology Management    Millie Riley is a 79 y.o. female was referred to Clinical Pharmacy Team for anticoagulation management.     Referring Provider: Gillombardo, Carl B, MD    THIS IS A FOLLOW UP PATIENT APPOINTMENT. AT LAST VISIT ON 2/19/2025 WITH PHARMACIST (Maisha Petty).    Appointment was completed by the patient who was reached at .    REVIEW OF PAST APPNT (IF APPLICABLE):   Today's appointment was initial visit to establish care with Clinical Pharmacy. Millie reports no abnormal bruising or bleeding with Eliquis. No hospitalizations or falls since last cardiology appointment.   We will be applying for [facility] for cost assistance with Eliquis. Of note, the prescription is too soon to refill until 3/28/2025, patient is aware we will not hear back regarding approval until that time.  Will follow up in 6 weeks.     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 06/27/2023    Hyperlipidemia     Hypertension     Other conditions influencing health status     Skin Abscess Of Toes    Pain in right axilla 03/27/2024    Personal history of COVID-19 06/15/2022    Personal history of other diseases of the musculoskeletal system and connective tissue     History of bursitis    Personal history of other diseases of the nervous system and sense organs     History of sciatica    Ulcer of ankle (Multi) 03/27/2024       Current Outpatient Medications on File Prior to Visit   Medication Sig Dispense Refill    apixaban (Eliquis) 5 mg tablet Take 1 tablet (5 mg) by mouth every 12 hours. 60 tablet 11    ascorbic acid, vitamin C, 500 mg capsule Take 1 capsule by mouth once daily.   "    atorvastatin (Lipitor) 40 mg tablet Take 1 tablet (40 mg) by mouth once daily. 90 tablet 3    cholecalciferol (Vitamin D-3) 50 MCG (2000 UT) tablet Take 1 tablet (2,000 Units) by mouth once daily.      dilTIAZem CD (Cardizem CD) 180 mg 24 hr capsule Take 1 capsule (180 mg) by mouth once daily. 90 capsule 3    DULoxetine (Cymbalta) 60 mg DR capsule TAKE 1 CAPSULE BY MOUTH ONCE DAILY. 90 capsule 1    levothyroxine (Synthroid, Levoxyl) 25 mcg tablet TAKE 1 TABLET BY MOUTH EVERY DAY 90 tablet 3    multivitamin (Daily Multi-Vitamin) tablet Take 1 tablet by mouth once daily.      spironolactone (Aldactone) 25 mg tablet Take 1 tablet (25 mg) by mouth once daily. 90 tablet 3    torsemide 40 mg tablet Take 40 mg by mouth once daily. 90 tablet 3    [DISCONTINUED] apixaban (Eliquis) 5 mg tablet Take 1 tablet (5 mg) by mouth every 12 hours. 180 tablet 3     No current facility-administered medications on file prior to visit.         RELEVANT LAB RESULTS:  Lab Results   Component Value Date    BILITOT 0.5 03/03/2025    CALCIUM 9.4 04/16/2025    CO2 25 04/16/2025     04/16/2025    CREATININE 1.09 (H) 04/16/2025    GLUCOSE 113 (H) 04/16/2025    ALKPHOS 92 03/03/2025    K 4.6 04/16/2025    PROT 7.4 03/03/2025     04/16/2025    AST 22 03/03/2025    ALT 31 (H) 03/03/2025    BUN 28 (H) 04/16/2025    ANIONGAP 14 04/16/2025    MG 1.91 01/02/2025    PHOS 3.9 01/02/2025    ALBUMIN 4.3 03/03/2025    GFRF 49 (A) 06/05/2023     Lab Results   Component Value Date    TRIG 97 04/03/2024    CHOL 169 04/03/2024    LDLCALC 87 04/03/2024    HDL 62.7 04/03/2024     No results found for: \"BMCBC\", \"CBCDIF\"     PHARMACEUTICAL ASSESSMENT:    MEDICATION RECONCILIATION    Was a medication reconciliation completed at today's appointment No  Home Pharmacy Reviewed? Yes, describe: [id number], [facility]    Added:  - None    Changed:  - None    Removed:  - None    Drug Interactions? No    Medication Documentation Review Audit       " Reviewed by Farideh Hanks MA (Medical Assistant) on 03/03/25 at 1104      Medication Order Taking? Sig Documenting Provider Last Dose Status   apixaban (Eliquis) 5 mg tablet [id number] Yes Take 1 tablet (5 mg) by mouth every 12 hours. Carl B Gillombardo, MD  Active   ascorbic acid, vitamin C, 500 mg capsule [id number] Yes Take 1 capsule by mouth once daily. Historical Provider, MD Taking Active   atorvastatin (Lipitor) 20 mg tablet [id number] Yes TAKE 1 TABLET BY MOUTH EVERY DAY Azalia Way MD  Active   cholecalciferol (Vitamin D-3) 50 MCG (2000 UT) tablet [id number] Yes Take 1 tablet (2,000 Units) by mouth once daily. Historical Provider, MD Taking Active   dilTIAZem CD (Cardizem CD) 180 mg 24 hr capsule [id number] Yes Take 1 capsule (180 mg) by mouth once daily. Carl B Gillombardo, MD  Active   DULoxetine (Cymbalta) 60 mg DR capsule [id number] Yes TAKE 1 CAPSULE BY MOUTH ONCE DAILY. Azalia Way MD  Active   levothyroxine (Synthroid, Levoxyl) 25 mcg tablet [id number] Yes TAKE 1 TABLET BY MOUTH EVERY DAY Azalia Way MD  Active   multivitamin (Daily Multi-Vitamin) tablet [id number] Yes Take 1 tablet by mouth once daily. Historical Provider, MD Taking Active   spironolactone (Aldactone) 25 mg tablet [id number] Yes Take 1 tablet (25 mg) by mouth once daily. Carl B Gillombardo, MD  Active   torsemide (Demadex) 20 mg tablet [id number] Yes Take 1 tablet (20 mg) by mouth once daily. Carl B Gillombardo, MD  Active    Patient not taking:   Discontinued 02/26/25 1052    Patient not taking:   Discontinued 02/26/25 1053                    DISEASE MANAGEMENT ASSESSMENT:     ANTICOAGULATION ASSESSMENT    The 10-year ASCVD risk score (Hailee STOUT, et al., 2019) is: 30.4%    Values used to calculate the score:      Age: 79 years      Sex: Female      Is Non- : No      Diabetic: No      Tobacco smoker: No      Systolic Blood Pressure: 124 mmHg      Is BP treated: Yes      HDL Cholesterol:  62.7 mg/dL      Total Cholesterol: 169 mg/dL    DIAGNOSIS: prevention of nonvalvular atrial fibrilliation stroke and systemic embolism  - Patient is projected to be on anticoagulation until after Watchman procedure  - [id number] Score: [5] (only included if diagnosis is atrial fibrillation)   Age: [<65 (0)] [65-74 (+1)] [> 75 (+2)]: 2  Sex: [Male/Female (+1)]: 1  CHF history: [No/Yes(+1)]: 1  Hypertension history: [No/Yes(+1)]: 1  Stroke/TIA/thromboembolism history: [No/Yes(+2)]: 0  Vascular disease history (prior MI, peripheral artery disease, aortic plaque): [No/Yes(+1)]: 0  Diabetes history: [No/Yes(+1)]: 0    CURRENT PHARMACOTHERAPY:   Eliquis 5 mg BID  78 y/o  117 kg  Scr 0.94 mg/dL (1/27/2025)    RELEVANT PAST MEDICAL HISTORY:   A-fib, HTN, prediabetes,  CHF    Affordability/Accessibility: Eliquis creates cost burden for patient  Adherence/Organization: confirms taking Eliquis twice daily (12 AM and 12 pm); patient uses pill box for organization. Missed about 4 days of medication due to copayment. Filled Eliquis through discounted pricing, patient now has 30 day supply.  Adverse Reactions: no abnormal bruising/bleeding; denies any further hematuria, no dark/tarry stools  Recent Hospitalizations: none   Recent Falls/Trauma: none  Changes in Tobacco or Alcohol Intake:   Tobacco: none, does not use  Alcohol: none, does not use    EDUCATION/COUNSELING:   - Counseled patient on MOA, expectations, duration of therapy, contraindications, administration, and monitoring parameters  - Counseled patient of side effects that are indicative of bleeding such as dark tarry stool, unexplainable bruising, or vomiting up a coffee ground like substance    DISCUSSION/NOTES:   Today's appointment was 1 month follow up regarding anticoagulation with Eliquis.  Millie reports no abnormal bruising or bleeding (no further hematuria), no recent hospitalizations or falls.  Unfortunately, at this time, Millie Riley is ineligible to  receive financial assistance through  Patient Assistance Program because income exceeds program requirements.  Patient was notified of ineligibility and given the following options: discussed alternative therapy with Pradaxa, estimated copayment would be $44.58 for 30 days, as opposed to $142 for Eliquis. Will discuss with referring provider. Discussed increased monitoring for abnormal bruising/bleeding as Pradaxa may have a higher risk than Eliquis. Also advised patient to discuss at her appointment with Dr. Hermosillo tomorrow, as she is being evaluated for a Watchman.   Will follow up in 1 month.      ASSESSMENT:    Assessment/Plan   Problem List Items Addressed This Visit       Atrial fibrillation (Multi)    Millie continues on anticoagulation with Eliquis. Will discuss alternative therapy (Pradaxa) will referring provider due to patient reported cost burden with Eliquis. No dosage adjustments required for renal function.         Relevant Orders    Referral to Clinical Pharmacy     RECOMMENDATIONS/PLAN:    Continue: Eliquis 5 mg BID  Will discuss alternative therapy (Pradaxa) with referring provider  Follow up: 1 month    Last Appnt with Referring Provider: 3/3/2025  Next Appnt with Referring Provider: 6/16/2025  Clinical Pharmacist follow up: 6/13/2025  VAF/Application Expiration: No  Type of Encounter: Fito Petty PharmD    Verbal consent to manage patient's drug therapy was obtained from the patient . They were informed they may decline to participate or withdraw from participation in pharmacy services at any time.    Continue all meds under the continuation of care with the referring provider and clinical pharmacy team.

## 2025-05-13 NOTE — PROGRESS NOTES
"    Patient was seen today by way of a virtual clinic encounter, total 61 minutes was spent during this encounter, 30 minutes of which was direct patient contact.    Cardio: Dr. Gillombardo I was asked by Dr. Gillombardo to evaluate this patient in consultation for evaluation of left atrial appendage closure.    The patient is a 79 y.o.  female with PMH of hypertension, hypothyroidism, lymphedema and paroxysmal atrial fibrillation. The patient has normal LVEF with left ventricular ejection fraction of 55 to 60% based on transthoracic echocardiogram January 2025.  The patient has no history of significant valvular heart disease.    Patient has been on Eliquis for stroke risk reduction and diltiazem for rate control strategy.  She has had a recent admission to [facility] for atrial fibrillation with rapid ventricular response and acute decompensated heart failure.  She underwent SANTOS cardioversion on January 2, 2025.    Unfortunately the patient has had issues with a mechanical fall and has gait instability.  She is only able to walk 25-30 steps at a time because before becoming dyspneic and weak.  In addition the patient has had intermittent hematuria though has not required hospitalization or transfusion for this.  Finally the patient has had compliance issues with Eliquis and admits that she recently missed 4 doses of this medication as she \"ran out of her medications.\"      Given the patient's significant fall risk, history of mechanical fall, hematuria and noncompliance,  the patient is referred for consideration of left atrial appendage closure for stroke risk reduction.       ROS:  Constitutional: no fatigue, no fevers, no body aches  Eyes: no acute eye problems, no blurred vision, no diplopia, no eye pain  ENT:  no acute hearing loss, no earache, no sore throat  Cardiovascular: dyspnea on exertion, no chest pain  Respiratory: no chronic cough, not coughing up sputum, no wheezing that is consistent with " asthma  Gastrointestinal: no acute bowel complaints  Musculoskeletal: no acute arthralgias, no acute myalgias, no acute joint swelling  Skin: no skin rashes, no change in skin color and pigmentation, no skin lesions and no skin lumps.   Neurological: no headaches, no dizziness, no tingling, no fainting and no limb weakness.   Psychiatric:  no suicidal ideation, no confusion, no personality change and no emotional problems.   Hematologic/Lymphatic: no bleeding issues, other then mentioned in HPI  All other systems have been reviewed and are negative for complaint.     Physical Exam:     Visit Vitals  Smoking Status Never        Constitutional: alert and in no acute distress.     Labs:    Results for orders placed or performed in visit on 04/09/25   Basic metabolic panel    Collection Time: 04/16/25 10:55 AM   Result Value Ref Range    GLUCOSE 113 (H) 65 - 99 mg/dL    UREA NITROGEN (BUN) 28 (H) 7 - 25 mg/dL    CREATININE 1.09 (H) 0.60 - 1.00 mg/dL    EGFR 52 (L) > OR = 60 mL/min/1.73m2    BUN/CREATININE RATIO 26 (H) 6 - 22 (calc)    SODIUM 143 135 - 146 mmol/L    POTASSIUM 4.6 3.5 - 5.3 mmol/L    CHLORIDE 104 98 - 110 mmol/L    CARBON DIOXIDE 25 20 - 32 mmol/L    ELECTROLYTE BALANCE 14 7 - 17 mmol/L (calc)    CALCIUM 9.4 8.6 - 10.4 mg/dL          Medications:    Current Outpatient Medications   Medication Instructions    apixaban (ELIQUIS) 5 mg, oral, Every 12 hours    ascorbic acid, vitamin C, 500 mg capsule 1 capsule, Daily RT    atorvastatin (LIPITOR) 40 mg, oral, Daily    cholecalciferol (VITAMIN D-3) 2,000 Units, Daily RT    dilTIAZem CD (CARDIZEM CD) 180 mg, oral, Daily    DULoxetine (CYMBALTA) 60 mg, oral, Daily    levothyroxine (SYNTHROID, LEVOXYL) 25 mcg, oral, Daily    multivitamin (Daily Multi-Vitamin) tablet 1 tablet, Daily    spironolactone (ALDACTONE) 25 mg, oral, Daily    torsemide 40 mg, oral, Daily          Assessmen/Plan:      This is a 79 y.o. female with hypertension, hyperlipidemia, lymphedema,  heart failure with preserved ejection fraction and paroxysmal atrial fibrillation.  The patient has a history of mechanical fall, hematuria and noncompliance.  All of these reasons are substantial indications to consider nonpharmacological approach to stroke risk reduction.      The CHADS-VASC score is 5 and HAS-BLED score is 4. The patient is at increased risk of both bleeding and stroke.  As such the patient is a reasonable candidate for consideration of left atrial appendage occluder placement.    Today we discussed the left atrial appendage closure procedure. The patient was given written educational handout materials and watched an educational video. All risks, benefits and alternative were discussed.     The risks discussed included but were not limited to vascular complications, sedation related complications, risk of MI, CVA, device embolization, pericardial tamponade and death. The patient verbalized understanding and decided to proceed.         The patient requires CT for planning and to exclude REBECCA thrombus. In addition, the patient will also need a CT scan 3 months after the procedure, to evaluate the device for position, thrombus and shadi-device leak. Following device implant, strategy will be low-dose Eliquis 2.5 mg twice daily for 3 months then aspirin for life.    Thank you, Dr. Gillombardo, for this consultation and for allowing me to participate in the care of this patient.      Elmer Hermosillo MD  , Interventional Cardiology Fellowship Program   [facility]   [facility] [employer]  Office Phone Number: [phone]

## 2025-05-14 ENCOUNTER — APPOINTMENT (OUTPATIENT)
Dept: PHARMACY | Facility: HOSPITAL | Age: 80
End: 2025-05-14
Payer: MEDICARE

## 2025-05-14 DIAGNOSIS — I48.91 ATRIAL FIBRILLATION (MULTI): ICD-10-CM

## 2025-05-14 NOTE — Clinical Note
Hello Dr. Gillombardo, I spoke with Millie today and she is unfortunately ineligible for  PAP for Eliquis due to her income exceeding program requirements. We discussed potentially switching to Pradaxa but wanted to discuss with you, as she had previously experienced hematuria with Eliquis. The copay would only be about $45/30 days for Pradaxa if you are agreeable to this change. Please let me know, thank you!

## 2025-05-14 NOTE — ASSESSMENT & PLAN NOTE
Millie continues on anticoagulation with Eliquis. Will discuss alternative therapy (Pradaxa) will referring provider due to patient reported cost burden with Eliquis. No dosage adjustments required for renal function.

## 2025-05-15 ENCOUNTER — TELEMEDICINE (OUTPATIENT)
Dept: CARDIOLOGY | Facility: CLINIC | Age: 80
End: 2025-05-15
Payer: MEDICARE

## 2025-05-15 DIAGNOSIS — Z51.81 ANTICOAGULATION MANAGEMENT ENCOUNTER: ICD-10-CM

## 2025-05-15 DIAGNOSIS — I48.91 ATRIAL FIBRILLATION, UNSPECIFIED TYPE (MULTI): ICD-10-CM

## 2025-05-15 DIAGNOSIS — I48.0 PAROXYSMAL A-FIB (MULTI): Primary | ICD-10-CM

## 2025-05-15 DIAGNOSIS — Z79.01 ANTICOAGULATION MANAGEMENT ENCOUNTER: ICD-10-CM

## 2025-05-15 PROCEDURE — 99214 OFFICE O/P EST MOD 30 MIN: CPT | Performed by: INTERNAL MEDICINE

## 2025-05-19 ENCOUNTER — TELEPHONE (OUTPATIENT)
Dept: PHARMACY | Facility: HOSPITAL | Age: 80
End: 2025-05-19
Payer: MEDICARE

## 2025-05-19 DIAGNOSIS — I48.0 PAROXYSMAL ATRIAL FIBRILLATION (MULTI): Primary | ICD-10-CM

## 2025-05-19 NOTE — TELEPHONE ENCOUNTER
Left voicemail for patient regarding anticoagulation.    Eliquis is cost prohibitive for patient. Per Dr. Gillombardo, okay to switch to Pradaxa for cost savings. Of note, patient is planned to have Watchman procedure. Original strategy per 5/15/2025 visit with Dr Hermosillo stated: after device implant,  low-dose Eliquis 2.5 mg twice daily for 3 months then aspirin for life. Will discuss with patient upon return call. Will plan to send prescription for Pradaxa to patient's preferred pharmacy after discussion.    Maisha Petty, PharmD

## 2025-05-20 RX ORDER — DABIGATRAN ETEXILATE 150 MG/1
150 CAPSULE ORAL 2 TIMES DAILY
Qty: 60 CAPSULE | Refills: 3 | Status: SHIPPED | OUTPATIENT
Start: 2025-05-20

## 2025-05-20 NOTE — TELEPHONE ENCOUNTER
Discussed conversion from Eliquis to Pradaxa with patient.    Millie is to complete her supply of Eliquis 5 mg BID, then will start Pradaxa 150 mg twice daily. She is aware not to take the medications together. Will monitor for any increase in bruising, or signs of bleeding.     Will reach out to Dr. Hermosillo regarding if dosage adjustment for Pradaxa will be needed after Watchman procedure. Will follow up with patient in 1 month to ensure tolerability of Pradaxa.    Maisha Petty, Pharm D

## 2025-05-22 ENCOUNTER — TELEPHONE (OUTPATIENT)
Dept: CARDIOLOGY | Facility: HOSPITAL | Age: 80
End: 2025-05-22
Payer: MEDICARE

## 2025-05-22 NOTE — TELEPHONE ENCOUNTER
RN called and spoke to pt about scheduling the Watchman procedure, pt offered 6/17 or 6/20, pt states she would like to discuss with her children for assistance with transportation on either day. Pt to call back and confirm.

## 2025-05-29 ENCOUNTER — TELEPHONE (OUTPATIENT)
Dept: CARDIOLOGY | Facility: CLINIC | Age: 80
End: 2025-05-29
Payer: MEDICARE

## 2025-06-02 ENCOUNTER — APPOINTMENT (OUTPATIENT)
Dept: SLEEP MEDICINE | Facility: CLINIC | Age: 80
End: 2025-06-02
Payer: MEDICARE

## 2025-06-02 DIAGNOSIS — Z00.6 ENCOUNTER FOR EXAMINATION FOR NORMAL COMPARISON AND CONTROL IN CLINICAL RESEARCH PROGRAM: ICD-10-CM

## 2025-06-02 DIAGNOSIS — I48.91 ATRIAL FIBRILLATION, UNSPECIFIED TYPE (MULTI): ICD-10-CM

## 2025-06-09 ENCOUNTER — TELEPHONE (OUTPATIENT)
Dept: CARDIOLOGY | Facility: HOSPITAL | Age: 80
End: 2025-06-09

## 2025-06-10 DIAGNOSIS — I48.91 ATRIAL FIBRILLATION (MULTI): ICD-10-CM

## 2025-06-12 NOTE — PROGRESS NOTES
Pharmacist Clinic: Cardiology Management    Millie Riley is a 79 y.o. female was referred to Clinical Pharmacy Team for anticoagulation management.     Referring Provider: Gillombardo, Carl B, MD    THIS IS A FOLLOW UP PATIENT APPOINTMENT. AT LAST VISIT ON 2/19/2025 WITH PHARMACIST (Maisha Petty).    Appointment was completed by the patient who was reached at .    REVIEW OF PAST APPNT (IF APPLICABLE):   Today's appointment was 1 month follow up regarding anticoagulation with Eliquis.  Millie reports no abnormal bruising or bleeding (no further hematuria), no recent hospitalizations or falls.  Unfortunately, at this time, Millie Riley is ineligible to receive financial assistance through  Patient Assistance Program because income exceeds program requirements.  Patient was notified of ineligibility and given the following options: discussed alternative therapy with Pradaxa, estimated copayment would be $44.58 for 30 days, as opposed to $142 for Eliquis. Will discuss with referring provider. Discussed increased monitoring for abnormal bruising/bleeding as Pradaxa may have a higher risk than Eliquis. Also advised patient to discuss at her appointment with Dr. Hermosillo tomorrow, as she is being evaluated for a Watchman.   Will follow up in 1 month.  Interim History 5/21/2025:   Discussed converting from Eliquis to Pradaxa due to cost. Advised patient to discuss with Dr. Gillombardo and Dr. Hermosillo as patient was unsure which she would prefer to move forward with. Per 6/10/25 patient message, patient is continuing Eliquis at this time.    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  06/27/2023    Hyperlipidemia     Hypertension     Other conditions influencing health status     Skin Abscess Of Toes    Pain in right axilla 03/27/2024    Personal history of COVID-19 06/15/2022    Personal history of other diseases of the musculoskeletal system and connective tissue     History of bursitis    Personal history of other diseases of the nervous system and sense organs     History of sciatica    Ulcer of ankle (Multi) 03/27/2024       Current Outpatient Medications on File Prior to Visit   Medication Sig Dispense Refill    apixaban (Eliquis) 5 mg tablet Take 1 tablet (5 mg) by mouth every 12 hours. 60 tablet 1    ascorbic acid, vitamin C, 500 mg capsule Take 1 capsule by mouth once daily.      atorvastatin (Lipitor) 40 mg tablet Take 1 tablet (40 mg) by mouth once daily. 90 tablet 3    cholecalciferol (Vitamin D-3) 50 MCG (2000 UT) tablet Take 1 tablet (2,000 Units) by mouth once daily.      dilTIAZem CD (Cardizem CD) 180 mg 24 hr capsule Take 1 capsule (180 mg) by mouth once daily. 90 capsule 3    DULoxetine (Cymbalta) 60 mg DR capsule TAKE 1 CAPSULE BY MOUTH ONCE DAILY. 90 capsule 1    levothyroxine (Synthroid, Levoxyl) 25 mcg tablet TAKE 1 TABLET BY MOUTH EVERY DAY 90 tablet 3    multivitamin (Daily Multi-Vitamin) tablet Take 1 tablet by mouth once daily.      spironolactone (Aldactone) 25 mg tablet Take 1 tablet (25 mg) by mouth once daily. 90 tablet 3    torsemide 40 mg tablet Take 40 mg by mouth once daily. 90 tablet 3    [DISCONTINUED] apixaban (Eliquis) 5 mg tablet Take 1 tablet (5 mg) by mouth every 12 hours. 60 tablet 11    [DISCONTINUED] dabigatran etexilate (Pradaxa) 150 mg capsule Take 1 capsule (150 mg) by mouth 2 times a day. Do not crush or chew. DO NOT TAKE MEDICATION WITH ELIQUIS. 60 capsule 3     No current facility-administered medications on file prior to visit.         RELEVANT LAB RESULTS:  Lab Results   Component Value Date    BILITOT 0.5 03/03/2025    CALCIUM 9.4 04/16/2025     "CO2 25 04/16/2025     04/16/2025    CREATININE 1.09 (H) 04/16/2025    GLUCOSE 113 (H) 04/16/2025    ALKPHOS 92 03/03/2025    K 4.6 04/16/2025    PROT 7.4 03/03/2025     04/16/2025    AST 22 03/03/2025    ALT 31 (H) 03/03/2025    BUN 28 (H) 04/16/2025    ANIONGAP 14 04/16/2025    MG 1.91 01/02/2025    PHOS 3.9 01/02/2025    ALBUMIN 4.3 03/03/2025    GFRF 49 (A) 06/05/2023     Lab Results   Component Value Date    TRIG 97 04/03/2024    CHOL 169 04/03/2024    LDLCALC 87 04/03/2024    HDL 62.7 04/03/2024     No results found for: \"BMCBC\", \"CBCDIF\"     PHARMACEUTICAL ASSESSMENT:    MEDICATION RECONCILIATION    Was a medication reconciliation completed at today's appointment No  Home Pharmacy Reviewed? Yes, describe: [id number], [facility]    Added:  - None    Changed:  - None    Removed:  - None    Drug Interactions? No    Medication Documentation Review Audit       Reviewed by Farideh Hanks MA (Medical Assistant) on 03/03/25 at 1104      Medication Order Taking? Sig Documenting Provider Last Dose Status   apixaban (Eliquis) 5 mg tablet [id number] Yes Take 1 tablet (5 mg) by mouth every 12 hours. Carl B Gillombardo, MD  Active   ascorbic acid, vitamin C, 500 mg capsule [id number] Yes Take 1 capsule by mouth once daily. Historical Provider, MD Taking Active   atorvastatin (Lipitor) 20 mg tablet [id number] Yes TAKE 1 TABLET BY MOUTH EVERY DAY Azalia Way MD  Active   cholecalciferol (Vitamin D-3) 50 MCG (2000 UT) tablet [id number] Yes Take 1 tablet (2,000 Units) by mouth once daily. Historical Provider, MD Taking Active   dilTIAZem CD (Cardizem CD) 180 mg 24 hr capsule [id number] Yes Take 1 capsule (180 mg) by mouth once daily. Carl B Gillombardo, MD  Active   DULoxetine (Cymbalta) 60 mg DR capsule [id number] Yes TAKE 1 CAPSULE BY MOUTH ONCE DAILY. Azalia Way MD  Active   levothyroxine (Synthroid, Levoxyl) 25 mcg tablet [id number] Yes TAKE 1 TABLET BY MOUTH EVERY DAY Azalia" MD Rayna  Active   multivitamin (Daily Multi-Vitamin) tablet [id number] Yes Take 1 tablet by mouth once daily. Historical Provider, MD Taking Active   spironolactone (Aldactone) 25 mg tablet [id number] Yes Take 1 tablet (25 mg) by mouth once daily. Carl B Gillombardo, MD  Active   torsemide (Demadex) 20 mg tablet [id number] Yes Take 1 tablet (20 mg) by mouth once daily. Carl B Gillombardo, MD  Active    Patient not taking:   Discontinued 02/26/25 1052    Patient not taking:   Discontinued 02/26/25 1053                    DISEASE MANAGEMENT ASSESSMENT:     ANTICOAGULATION ASSESSMENT    The 10-year ASCVD risk score (Hailee DK, et al., 2019) is: 30.4%    Values used to calculate the score:      Age: 79 years      Sex: Female      Is Non- : No      Diabetic: No      Tobacco smoker: No      Systolic Blood Pressure: 124 mmHg      Is BP treated: Yes      HDL Cholesterol: 62.7 mg/dL      Total Cholesterol: 169 mg/dL    DIAGNOSIS: prevention of nonvalvular atrial fibrilliation stroke and systemic embolism  - Patient is projected to be on anticoagulation until after Watchman procedure  - [id number] Score: [5] (only included if diagnosis is atrial fibrillation)   Age: [<65 (0)] [65-74 (+1)] [> 75 (+2)]: 2  Sex: [Male/Female (+1)]: 1  CHF history: [No/Yes(+1)]: 1  Hypertension history: [No/Yes(+1)]: 1  Stroke/TIA/thromboembolism history: [No/Yes(+2)]: 0  Vascular disease history (prior MI, peripheral artery disease, aortic plaque): [No/Yes(+1)]: 0  Diabetes history: [No/Yes(+1)]: 0    CURRENT PHARMACOTHERAPY:   Eliquis 5 mg BID  80 y/o  117 kg  Scr 0.94 mg/dL (1/27/2025)    RELEVANT PAST MEDICAL HISTORY:   A-fib, HTN, prediabetes,  CHF    Affordability/Accessibility: Previously ineligible for [facility]. Reports she was able to fill at a reasonable cost at [facility]. Patient decided to continue Eliquis instead of switching to Pradaxa due to concern of bleeding risk.  Adherence/Organization: confirms taking  Eliquis twice daily  Adverse Reactions: no abnormal bruising/bleeding; denies any further hematuria, no dark/tarry stools  Recent Hospitalizations: none   Recent Falls/Trauma: none  Changes in Tobacco or Alcohol Intake:   Tobacco: none, does not use  Alcohol: none, does not use    EDUCATION/COUNSELING:   - Counseled patient on MOA, expectations, duration of therapy, contraindications, administration, and monitoring parameters  - Counseled patient of side effects that are indicative of bleeding such as dark tarry stool, unexplainable bruising, or vomiting up a coffee ground like substance    DISCUSSION/NOTES:   Today's appointment was 1 month follow up regarding anticoagulation.  Millie confirms she was able to fill Eliquis at [facility] for a reasonable price. She had decided not to switch to Pradaxa due to concern for bleeding. Confirms she has notified both cardiologists.  No abnormal bruising or bleeding reported today. No recent hospitalizations or falls.  No follow up scheduled at this time, encouraged patient to call for any future questions/concerns.       ASSESSMENT:    Assessment/Plan   Problem List Items Addressed This Visit       Atrial fibrillation (Multi)    Millie continues on anticoagulation with Eliquis. No dosage adjustment required for age, weight, and renal function.            RECOMMENDATIONS/PLAN:    Continue: Eliquis 5 mg BID  Follow up: None at this time    Last Appnt with Referring Provider: 3/3/2025  Next Appnt with Referring Provider: 6/16/2025  Clinical Pharmacist follow up: None at this time  VAF/Application Expiration: No  Type of Encounter: Fito Petty PharmD    Verbal consent to manage patient's drug therapy was obtained from the patient . They were informed they may decline to participate or withdraw from participation in pharmacy services at any time.    Continue all meds under the continuation of care with the referring provider and clinical pharmacy team.

## 2025-06-13 ENCOUNTER — APPOINTMENT (OUTPATIENT)
Dept: PHARMACY | Facility: HOSPITAL | Age: 80
End: 2025-06-13
Payer: MEDICARE

## 2025-06-13 DIAGNOSIS — I48.91 ATRIAL FIBRILLATION (MULTI): ICD-10-CM

## 2025-06-13 NOTE — ASSESSMENT & PLAN NOTE
Millie continues on anticoagulation with Eliquis. No dosage adjustment required for age, weight, and renal function.

## 2025-06-13 NOTE — Clinical Note
Hello Dr. Gillombardo, Bonnie confirms she was able to fill Eliquis at [facility] for a reasonable price. She had decided not to switch to Pradaxa due to concern for bleeding. No abnormal bruising or bleeding with Eliquis at this time. No follow up scheduled today, encouraged patient to call for any future questions/concerns.

## 2025-06-19 ENCOUNTER — TELEPHONE (OUTPATIENT)
Dept: CARDIOLOGY | Facility: HOSPITAL | Age: 80
End: 2025-06-19

## 2025-06-19 DIAGNOSIS — I87.2 STASIS DERMATITIS OF BOTH LEGS: Primary | ICD-10-CM

## 2025-06-19 RX ORDER — TRIAMCINOLONE ACETONIDE 1 MG/G
CREAM TOPICAL 2 TIMES DAILY
Qty: 80 G | Refills: 0 | Status: ON HOLD | OUTPATIENT
Start: 2025-06-19

## 2025-06-20 ENCOUNTER — APPOINTMENT (OUTPATIENT)
Dept: RADIOLOGY | Facility: HOSPITAL | Age: 80
DRG: 660 | End: 2025-06-20
Payer: MEDICARE

## 2025-06-20 ENCOUNTER — HOSPITAL ENCOUNTER (INPATIENT)
Facility: HOSPITAL | Age: 80
DRG: 660 | End: 2025-06-20
Attending: EMERGENCY MEDICINE | Admitting: FAMILY MEDICINE
Payer: MEDICARE

## 2025-06-20 ENCOUNTER — APPOINTMENT (OUTPATIENT)
Dept: CARDIOLOGY | Facility: HOSPITAL | Age: 80
DRG: 660 | End: 2025-06-20
Payer: MEDICARE

## 2025-06-20 DIAGNOSIS — N12 PYELONEPHRITIS: Primary | ICD-10-CM

## 2025-06-20 DIAGNOSIS — I48.0 PAROXYSMAL ATRIAL FIBRILLATION (MULTI): ICD-10-CM

## 2025-06-20 DIAGNOSIS — N17.9 AKI (ACUTE KIDNEY INJURY): ICD-10-CM

## 2025-06-20 DIAGNOSIS — N20.0 KIDNEY STONE ON LEFT SIDE: ICD-10-CM

## 2025-06-20 LAB
ABO GROUP (TYPE) IN BLOOD: NORMAL
ALBUMIN SERPL BCP-MCNC: 4.2 G/DL (ref 3.4–5)
ALP SERPL-CCNC: 94 U/L (ref 33–136)
ALT SERPL W P-5'-P-CCNC: 29 U/L (ref 7–45)
ANION GAP BLDV CALCULATED.4IONS-SCNC: 13 MMOL/L (ref 10–25)
ANION GAP SERPL CALC-SCNC: 13 MMOL/L (ref 10–20)
ANTIBODY SCREEN: NORMAL
APPEARANCE UR: CLEAR
AST SERPL W P-5'-P-CCNC: 23 U/L (ref 9–39)
BASE EXCESS BLDV CALC-SCNC: 2.7 MMOL/L (ref -2–3)
BASOPHILS # BLD AUTO: 0.03 X10*3/UL (ref 0–0.1)
BASOPHILS NFR BLD AUTO: 0.3 %
BILIRUB DIRECT SERPL-MCNC: 0.1 MG/DL (ref 0–0.3)
BILIRUB SERPL-MCNC: 0.6 MG/DL (ref 0–1.2)
BILIRUB UR STRIP.AUTO-MCNC: NEGATIVE MG/DL
BODY TEMPERATURE: 37 DEGREES CELSIUS
BUN SERPL-MCNC: 33 MG/DL (ref 6–23)
CA-I BLDV-SCNC: 1.18 MMOL/L (ref 1.1–1.33)
CALCIUM SERPL-MCNC: 9.1 MG/DL (ref 8.6–10.3)
CHLORIDE BLDV-SCNC: 99 MMOL/L (ref 98–107)
CHLORIDE SERPL-SCNC: 101 MMOL/L (ref 98–107)
CO2 SERPL-SCNC: 27 MMOL/L (ref 21–32)
COLOR UR: ABNORMAL
CREAT SERPL-MCNC: 1.41 MG/DL (ref 0.5–1.05)
EGFRCR SERPLBLD CKD-EPI 2021: 38 ML/MIN/1.73M*2
EOSINOPHIL # BLD AUTO: 0.01 X10*3/UL (ref 0–0.4)
EOSINOPHIL NFR BLD AUTO: 0.1 %
ERYTHROCYTE [DISTWIDTH] IN BLOOD BY AUTOMATED COUNT: 13.4 % (ref 11.5–14.5)
GLUCOSE BLDV-MCNC: 130 MG/DL (ref 74–99)
GLUCOSE SERPL-MCNC: 123 MG/DL (ref 74–99)
GLUCOSE UR STRIP.AUTO-MCNC: NORMAL MG/DL
HCO3 BLDV-SCNC: 28.5 MMOL/L (ref 22–26)
HCT VFR BLD AUTO: 45.5 % (ref 36–46)
HCT VFR BLD EST: 45 % (ref 36–46)
HGB BLD-MCNC: 14.4 G/DL (ref 12–16)
HGB BLDV-MCNC: 14.9 G/DL (ref 12–16)
HOLD SPECIMEN: NORMAL
IMM GRANULOCYTES # BLD AUTO: 0.03 X10*3/UL (ref 0–0.5)
IMM GRANULOCYTES NFR BLD AUTO: 0.3 % (ref 0–0.9)
INHALED O2 CONCENTRATION: 21 %
INR PPP: 1.3 (ref 0.9–1.1)
IRON SATN MFR SERPL: 14 % (ref 25–45)
IRON SERPL-MCNC: 56 UG/DL (ref 35–150)
KETONES UR STRIP.AUTO-MCNC: NEGATIVE MG/DL
LACTATE BLDV-SCNC: 2.3 MMOL/L (ref 0.4–2)
LACTATE BLDV-SCNC: 3.1 MMOL/L (ref 0.4–2)
LEUKOCYTE ESTERASE UR QL STRIP.AUTO: NEGATIVE
LYMPHOCYTES # BLD AUTO: 1.04 X10*3/UL (ref 0.8–3)
LYMPHOCYTES NFR BLD AUTO: 11.2 %
MCH RBC QN AUTO: 29.7 PG (ref 26–34)
MCHC RBC AUTO-ENTMCNC: 31.6 G/DL (ref 32–36)
MCV RBC AUTO: 94 FL (ref 80–100)
MONOCYTES # BLD AUTO: 0.77 X10*3/UL (ref 0.05–0.8)
MONOCYTES NFR BLD AUTO: 8.3 %
MUCOUS THREADS #/AREA URNS AUTO: ABNORMAL /LPF
NEUTROPHILS # BLD AUTO: 7.42 X10*3/UL (ref 1.6–5.5)
NEUTROPHILS NFR BLD AUTO: 79.8 %
NITRITE UR QL STRIP.AUTO: NEGATIVE
NRBC BLD-RTO: 0 /100 WBCS (ref 0–0)
OXYHGB MFR BLDV: 70.4 % (ref 45–75)
PCO2 BLDV: 47 MM HG (ref 41–51)
PH BLDV: 7.39 PH (ref 7.33–7.43)
PH UR STRIP.AUTO: 6 [PH]
PLATELET # BLD AUTO: 265 X10*3/UL (ref 150–450)
PO2 BLDV: 42 MM HG (ref 35–45)
POTASSIUM BLDV-SCNC: 5 MMOL/L (ref 3.5–5.3)
POTASSIUM SERPL-SCNC: 4.8 MMOL/L (ref 3.5–5.3)
PROT SERPL-MCNC: 7.6 G/DL (ref 6.4–8.2)
PROT UR STRIP.AUTO-MCNC: NEGATIVE MG/DL
PROTHROMBIN TIME: 14.4 SECONDS (ref 9.8–12.4)
Q ONSET: 214 MS
QRS COUNT: 17 BEATS
QRS DURATION: 80 MS
QT INTERVAL: 340 MS
QTC CALCULATION(BAZETT): 440 MS
QTC FREDERICIA: 404 MS
R AXIS: -56 DEGREES
RBC # BLD AUTO: 4.85 X10*6/UL (ref 4–5.2)
RBC # UR STRIP.AUTO: ABNORMAL MG/DL
RBC #/AREA URNS AUTO: >20 /HPF
RH FACTOR (ANTIGEN D): NORMAL
SAO2 % BLDV: 72 % (ref 45–75)
SODIUM BLDV-SCNC: 135 MMOL/L (ref 136–145)
SODIUM SERPL-SCNC: 136 MMOL/L (ref 136–145)
SP GR UR STRIP.AUTO: 1.04
T AXIS: 98 DEGREES
T OFFSET: 384 MS
TIBC SERPL-MCNC: 392 UG/DL (ref 240–445)
UIBC SERPL-MCNC: 336 UG/DL (ref 110–370)
UROBILINOGEN UR STRIP.AUTO-MCNC: NORMAL MG/DL
VENTRICULAR RATE: 101 BPM
WBC # BLD AUTO: 9.3 X10*3/UL (ref 4.4–11.3)
WBC #/AREA URNS AUTO: ABNORMAL /HPF

## 2025-06-20 PROCEDURE — 84132 ASSAY OF SERUM POTASSIUM: CPT | Performed by: EMERGENCY MEDICINE

## 2025-06-20 PROCEDURE — 82248 BILIRUBIN DIRECT: CPT | Performed by: EMERGENCY MEDICINE

## 2025-06-20 PROCEDURE — 36415 COLL VENOUS BLD VENIPUNCTURE: CPT | Performed by: EMERGENCY MEDICINE

## 2025-06-20 PROCEDURE — 85025 COMPLETE CBC W/AUTO DIFF WBC: CPT

## 2025-06-20 PROCEDURE — 74174 CTA ABD&PLVS W/CONTRAST: CPT | Performed by: STUDENT IN AN ORGANIZED HEALTH CARE EDUCATION/TRAINING PROGRAM

## 2025-06-20 PROCEDURE — 96365 THER/PROPH/DIAG IV INF INIT: CPT

## 2025-06-20 PROCEDURE — 83605 ASSAY OF LACTIC ACID: CPT | Performed by: EMERGENCY MEDICINE

## 2025-06-20 PROCEDURE — 2500000001 HC RX 250 WO HCPCS SELF ADMINISTERED DRUGS (ALT 637 FOR MEDICARE OP): Performed by: EMERGENCY MEDICINE

## 2025-06-20 PROCEDURE — 99221 1ST HOSP IP/OBS SF/LOW 40: CPT

## 2025-06-20 PROCEDURE — 1210000001 HC SEMI-PRIVATE ROOM DAILY

## 2025-06-20 PROCEDURE — 99285 EMERGENCY DEPT VISIT HI MDM: CPT | Mod: 25 | Performed by: EMERGENCY MEDICINE

## 2025-06-20 PROCEDURE — 2500000004 HC RX 250 GENERAL PHARMACY W/ HCPCS (ALT 636 FOR OP/ED): Performed by: EMERGENCY MEDICINE

## 2025-06-20 PROCEDURE — 81001 URINALYSIS AUTO W/SCOPE: CPT | Performed by: EMERGENCY MEDICINE

## 2025-06-20 PROCEDURE — 74174 CTA ABD&PLVS W/CONTRAST: CPT

## 2025-06-20 PROCEDURE — 86900 BLOOD TYPING SEROLOGIC ABO: CPT

## 2025-06-20 PROCEDURE — 83550 IRON BINDING TEST: CPT | Performed by: EMERGENCY MEDICINE

## 2025-06-20 PROCEDURE — 96361 HYDRATE IV INFUSION ADD-ON: CPT

## 2025-06-20 PROCEDURE — 85610 PROTHROMBIN TIME: CPT

## 2025-06-20 PROCEDURE — 2550000001 HC RX 255 CONTRASTS: Performed by: EMERGENCY MEDICINE

## 2025-06-20 PROCEDURE — 87075 CULTR BACTERIA EXCEPT BLOOD: CPT | Mod: AHULAB | Performed by: EMERGENCY MEDICINE

## 2025-06-20 PROCEDURE — 93005 ELECTROCARDIOGRAM TRACING: CPT

## 2025-06-20 RX ORDER — PROCHLORPERAZINE 25 MG/1
25 SUPPOSITORY RECTAL EVERY 12 HOURS PRN
Status: CANCELLED | OUTPATIENT
Start: 2025-06-20

## 2025-06-20 RX ORDER — ENOXAPARIN SODIUM 100 MG/ML
40 INJECTION SUBCUTANEOUS EVERY 12 HOURS SCHEDULED
Status: CANCELLED | OUTPATIENT
Start: 2025-06-20

## 2025-06-20 RX ORDER — ACETAMINOPHEN 325 MG/1
975 TABLET ORAL ONCE
Status: COMPLETED | OUTPATIENT
Start: 2025-06-20 | End: 2025-06-20

## 2025-06-20 RX ORDER — POLYETHYLENE GLYCOL 3350 17 G/17G
17 POWDER, FOR SOLUTION ORAL DAILY
Status: CANCELLED | OUTPATIENT
Start: 2025-06-21

## 2025-06-20 RX ORDER — ACETAMINOPHEN 160 MG/5ML
650 SOLUTION ORAL EVERY 4 HOURS PRN
Status: CANCELLED | OUTPATIENT
Start: 2025-06-20

## 2025-06-20 RX ORDER — ACETAMINOPHEN 650 MG/1
650 SUPPOSITORY RECTAL EVERY 4 HOURS PRN
Status: CANCELLED | OUTPATIENT
Start: 2025-06-20

## 2025-06-20 RX ORDER — SODIUM CHLORIDE, SODIUM LACTATE, POTASSIUM CHLORIDE, CALCIUM CHLORIDE 600; 310; 30; 20 MG/100ML; MG/100ML; MG/100ML; MG/100ML
100 INJECTION, SOLUTION INTRAVENOUS CONTINUOUS
Status: CANCELLED | OUTPATIENT
Start: 2025-06-20 | End: 2025-06-21

## 2025-06-20 RX ORDER — PROCHLORPERAZINE EDISYLATE 5 MG/ML
10 INJECTION INTRAMUSCULAR; INTRAVENOUS EVERY 6 HOURS PRN
Status: CANCELLED | OUTPATIENT
Start: 2025-06-20

## 2025-06-20 RX ORDER — ONDANSETRON HYDROCHLORIDE 2 MG/ML
4 INJECTION, SOLUTION INTRAVENOUS ONCE
Status: DISCONTINUED | OUTPATIENT
Start: 2025-06-20 | End: 2025-06-24

## 2025-06-20 RX ORDER — MORPHINE SULFATE 4 MG/ML
4 INJECTION, SOLUTION INTRAMUSCULAR; INTRAVENOUS ONCE
Status: DISCONTINUED | OUTPATIENT
Start: 2025-06-20 | End: 2025-06-24

## 2025-06-20 RX ORDER — CEFTRIAXONE 2 G/50ML
2 INJECTION, SOLUTION INTRAVENOUS ONCE
Status: COMPLETED | OUTPATIENT
Start: 2025-06-20 | End: 2025-06-20

## 2025-06-20 RX ORDER — GUAIFENESIN 600 MG/1
600 TABLET, EXTENDED RELEASE ORAL EVERY 12 HOURS PRN
Status: CANCELLED | OUTPATIENT
Start: 2025-06-20

## 2025-06-20 RX ORDER — ACETAMINOPHEN 325 MG/1
650 TABLET ORAL EVERY 4 HOURS PRN
Status: CANCELLED | OUTPATIENT
Start: 2025-06-20

## 2025-06-20 RX ORDER — ACETAMINOPHEN 325 MG/1
TABLET ORAL
Status: DISPENSED
Start: 2025-06-20 | End: 2025-06-21

## 2025-06-20 RX ORDER — PROCHLORPERAZINE MALEATE 5 MG
10 TABLET ORAL EVERY 6 HOURS PRN
Status: CANCELLED | OUTPATIENT
Start: 2025-06-20

## 2025-06-20 RX ADMIN — ACETAMINOPHEN 975 MG: 325 TABLET ORAL at 22:47

## 2025-06-20 RX ADMIN — CEFTRIAXONE 2 G: 2 INJECTION, SOLUTION INTRAVENOUS at 22:14

## 2025-06-20 RX ADMIN — IOHEXOL 90 ML: 350 INJECTION, SOLUTION INTRAVENOUS at 19:07

## 2025-06-20 RX ADMIN — SODIUM CHLORIDE 1000 ML: 0.9 INJECTION, SOLUTION INTRAVENOUS at 19:00

## 2025-06-20 ASSESSMENT — PAIN SCALES - GENERAL
PAINLEVEL_OUTOF10: 7
PAINLEVEL_OUTOF10: 0 - NO PAIN
PAINLEVEL_OUTOF10: 7
PAINLEVEL_OUTOF10: 0 - NO PAIN
PAINLEVEL_OUTOF10: 7

## 2025-06-20 ASSESSMENT — PAIN - FUNCTIONAL ASSESSMENT
PAIN_FUNCTIONAL_ASSESSMENT: 0-10
PAIN_FUNCTIONAL_ASSESSMENT: 0-10

## 2025-06-20 ASSESSMENT — PAIN DESCRIPTION - ORIENTATION: ORIENTATION: LEFT

## 2025-06-20 ASSESSMENT — PAIN DESCRIPTION - PAIN TYPE: TYPE: ACUTE PAIN

## 2025-06-20 ASSESSMENT — PAIN DESCRIPTION - DESCRIPTORS: DESCRIPTORS: ACHING

## 2025-06-20 ASSESSMENT — PAIN DESCRIPTION - LOCATION
LOCATION: BACK
LOCATION: ABDOMEN

## 2025-06-20 NOTE — ED TRIAGE NOTES
TRIAGE NOTE   I saw the patient as the Clinician in Triage and performed a brief history and physical exam, established acuity, and ordered appropriate tests to develop basic plan of care. Patient will be seen by an JULISA, resident and/or physician who will independently evaluate the patient. Please see subsequent provider notes for further details and disposition.     Brief HPI: In brief, Millie Riley is a 79 y.o. female that presents for black stool started today on Eliquis.  Epigastric pain.  No chest pain or shortness of breath.  No diarrhea.  No other symptoms or concerns at this time.    Focused Physical exam:   Mild tenderness epigastric region.  No rebound tenderness or guarding.  Plan/MDM:   Initiating basic labs, type and screen, INR, troponin, EKG, fecal occult blood test.  Patient will be seen in the back to the ED for further evaluation and treatment.  I will not be following with this patient during her ED visit.  This is a preliminary evaluation during triage.    I evaluated this patient in triage with the RN. Due to the patients complaint labs and or imaging were ordered by myself in an attempt to expedite patient care however I am not participating in care after evaluation. This is a preliminary assessment. Pt does not appear in acute distress at this time. They will have a full evaluation as soon as possible. They will be cared for by another provider who will possibly order more labs, imaging or interventions. Pt did not have a full ROS or PE completed by myself however below is a summary with reasons for orders.  For the remainder of the patient's workup and ED course, please refer to the main ED provider note. We discussed need for diagnostic testing including laboratory studies and imaging.  We also discussed that patient may be asked to wait in the waiting room while these tests are pending.  They understand that if they choose to leave without having the testing completed or resulted that  we cannot rule out acute life threatening illnesses and the risks involved could lead to worsening condition, permanent disability or even death.     Please see subsequent provider note for further details and disposition

## 2025-06-20 NOTE — PROGRESS NOTES
Patient's GFR is low, patient is getting aggressive hydration, currently given clinical history, benefits of CTA outweigh the risks.

## 2025-06-21 LAB
ANION GAP SERPL CALC-SCNC: 16 MMOL/L (ref 10–20)
BUN SERPL-MCNC: 31 MG/DL (ref 6–23)
CALCIUM SERPL-MCNC: 9 MG/DL (ref 8.6–10.3)
CHLORIDE SERPL-SCNC: 104 MMOL/L (ref 98–107)
CO2 SERPL-SCNC: 22 MMOL/L (ref 21–32)
CREAT SERPL-MCNC: 1.58 MG/DL (ref 0.5–1.05)
EGFRCR SERPLBLD CKD-EPI 2021: 33 ML/MIN/1.73M*2
ERYTHROCYTE [DISTWIDTH] IN BLOOD BY AUTOMATED COUNT: 13.7 % (ref 11.5–14.5)
GLUCOSE SERPL-MCNC: 105 MG/DL (ref 74–99)
HCT VFR BLD AUTO: 40.6 % (ref 36–46)
HGB BLD-MCNC: 13.3 G/DL (ref 12–16)
MCH RBC QN AUTO: 29.8 PG (ref 26–34)
MCHC RBC AUTO-ENTMCNC: 32.8 G/DL (ref 32–36)
MCV RBC AUTO: 91 FL (ref 80–100)
NRBC BLD-RTO: 0 /100 WBCS (ref 0–0)
PLATELET # BLD AUTO: 236 X10*3/UL (ref 150–450)
POTASSIUM SERPL-SCNC: 4.4 MMOL/L (ref 3.5–5.3)
RBC # BLD AUTO: 4.47 X10*6/UL (ref 4–5.2)
SODIUM SERPL-SCNC: 138 MMOL/L (ref 136–145)
WBC # BLD AUTO: 8 X10*3/UL (ref 4.4–11.3)

## 2025-06-21 PROCEDURE — 85027 COMPLETE CBC AUTOMATED: CPT | Performed by: FAMILY MEDICINE

## 2025-06-21 PROCEDURE — 2500000004 HC RX 250 GENERAL PHARMACY W/ HCPCS (ALT 636 FOR OP/ED): Performed by: FAMILY MEDICINE

## 2025-06-21 PROCEDURE — 82374 ASSAY BLOOD CARBON DIOXIDE: CPT | Performed by: FAMILY MEDICINE

## 2025-06-21 PROCEDURE — 2500000001 HC RX 250 WO HCPCS SELF ADMINISTERED DRUGS (ALT 637 FOR MEDICARE OP): Performed by: FAMILY MEDICINE

## 2025-06-21 PROCEDURE — 1200000002 HC GENERAL ROOM WITH TELEMETRY DAILY

## 2025-06-21 PROCEDURE — 36415 COLL VENOUS BLD VENIPUNCTURE: CPT | Performed by: FAMILY MEDICINE

## 2025-06-21 RX ORDER — PANTOPRAZOLE SODIUM 40 MG/10ML
40 INJECTION, POWDER, LYOPHILIZED, FOR SOLUTION INTRAVENOUS
Status: DISCONTINUED | OUTPATIENT
Start: 2025-06-21 | End: 2025-06-27 | Stop reason: HOSPADM

## 2025-06-21 RX ORDER — DILTIAZEM HYDROCHLORIDE 180 MG/1
180 CAPSULE, COATED, EXTENDED RELEASE ORAL DAILY
Status: DISCONTINUED | OUTPATIENT
Start: 2025-06-21 | End: 2025-06-22

## 2025-06-21 RX ORDER — ONDANSETRON 4 MG/1
4 TABLET, FILM COATED ORAL EVERY 8 HOURS PRN
Status: DISCONTINUED | OUTPATIENT
Start: 2025-06-21 | End: 2025-06-27 | Stop reason: HOSPADM

## 2025-06-21 RX ORDER — ACETAMINOPHEN 160 MG/5ML
650 SOLUTION ORAL EVERY 4 HOURS PRN
Status: DISCONTINUED | OUTPATIENT
Start: 2025-06-21 | End: 2025-06-27 | Stop reason: HOSPADM

## 2025-06-21 RX ORDER — PANTOPRAZOLE SODIUM 40 MG/1
40 TABLET, DELAYED RELEASE ORAL
Status: DISCONTINUED | OUTPATIENT
Start: 2025-06-21 | End: 2025-06-27 | Stop reason: HOSPADM

## 2025-06-21 RX ORDER — CHOLECALCIFEROL (VITAMIN D3) 25 MCG
50 TABLET ORAL DAILY
Status: DISCONTINUED | OUTPATIENT
Start: 2025-06-21 | End: 2025-06-27 | Stop reason: HOSPADM

## 2025-06-21 RX ORDER — SENNOSIDES 8.6 MG/1
2 TABLET ORAL 2 TIMES DAILY
Status: DISCONTINUED | OUTPATIENT
Start: 2025-06-21 | End: 2025-06-27 | Stop reason: HOSPADM

## 2025-06-21 RX ORDER — TALC
3 POWDER (GRAM) TOPICAL NIGHTLY PRN
Status: DISCONTINUED | OUTPATIENT
Start: 2025-06-21 | End: 2025-06-27 | Stop reason: HOSPADM

## 2025-06-21 RX ORDER — ACETAMINOPHEN 325 MG/1
650 TABLET ORAL EVERY 4 HOURS PRN
Status: DISCONTINUED | OUTPATIENT
Start: 2025-06-21 | End: 2025-06-27 | Stop reason: HOSPADM

## 2025-06-21 RX ORDER — GUAIFENESIN 600 MG/1
600 TABLET, EXTENDED RELEASE ORAL EVERY 12 HOURS PRN
Status: DISCONTINUED | OUTPATIENT
Start: 2025-06-21 | End: 2025-06-27 | Stop reason: HOSPADM

## 2025-06-21 RX ORDER — ACETAMINOPHEN 650 MG/1
650 SUPPOSITORY RECTAL EVERY 4 HOURS PRN
Status: DISCONTINUED | OUTPATIENT
Start: 2025-06-21 | End: 2025-06-27 | Stop reason: HOSPADM

## 2025-06-21 RX ORDER — SODIUM CHLORIDE, SODIUM LACTATE, POTASSIUM CHLORIDE, CALCIUM CHLORIDE 600; 310; 30; 20 MG/100ML; MG/100ML; MG/100ML; MG/100ML
100 INJECTION, SOLUTION INTRAVENOUS CONTINUOUS
Status: ACTIVE | OUTPATIENT
Start: 2025-06-21 | End: 2025-06-21

## 2025-06-21 RX ORDER — LEVOTHYROXINE SODIUM 25 UG/1
25 TABLET ORAL
Status: DISCONTINUED | OUTPATIENT
Start: 2025-06-21 | End: 2025-06-27 | Stop reason: HOSPADM

## 2025-06-21 RX ORDER — DULOXETIN HYDROCHLORIDE 30 MG/1
60 CAPSULE, DELAYED RELEASE ORAL DAILY
Status: DISCONTINUED | OUTPATIENT
Start: 2025-06-21 | End: 2025-06-27 | Stop reason: HOSPADM

## 2025-06-21 RX ORDER — ATORVASTATIN CALCIUM 40 MG/1
40 TABLET, FILM COATED ORAL DAILY
Status: DISCONTINUED | OUTPATIENT
Start: 2025-06-21 | End: 2025-06-27 | Stop reason: HOSPADM

## 2025-06-21 RX ORDER — CEFTRIAXONE 1 G/50ML
1 INJECTION, SOLUTION INTRAVENOUS EVERY 24 HOURS
Status: DISCONTINUED | OUTPATIENT
Start: 2025-06-21 | End: 2025-06-22

## 2025-06-21 RX ORDER — TRIAMCINOLONE ACETONIDE 1 MG/G
1 OINTMENT TOPICAL 2 TIMES DAILY
Status: DISCONTINUED | OUTPATIENT
Start: 2025-06-21 | End: 2025-06-27 | Stop reason: HOSPADM

## 2025-06-21 RX ORDER — ONDANSETRON HYDROCHLORIDE 2 MG/ML
4 INJECTION, SOLUTION INTRAVENOUS EVERY 8 HOURS PRN
Status: DISCONTINUED | OUTPATIENT
Start: 2025-06-21 | End: 2025-06-27 | Stop reason: HOSPADM

## 2025-06-21 RX ADMIN — DULOXETINE 60 MG: 30 CAPSULE, DELAYED RELEASE ORAL at 08:19

## 2025-06-21 RX ADMIN — PANTOPRAZOLE SODIUM 40 MG: 40 TABLET, DELAYED RELEASE ORAL at 06:10

## 2025-06-21 RX ADMIN — Medication 50 MCG: at 08:19

## 2025-06-21 RX ADMIN — SENNOSIDES 17.2 MG: 8.6 TABLET, FILM COATED ORAL at 21:01

## 2025-06-21 RX ADMIN — LEVOTHYROXINE SODIUM 25 MCG: 0.03 TABLET ORAL at 06:10

## 2025-06-21 RX ADMIN — TRIAMCINOLONE ACETONIDE 1 APPLICATION: 1 OINTMENT TOPICAL at 21:03

## 2025-06-21 RX ADMIN — ACETAMINOPHEN 650 MG: 325 TABLET ORAL at 21:03

## 2025-06-21 RX ADMIN — SODIUM CHLORIDE, SODIUM LACTATE, POTASSIUM CHLORIDE, AND CALCIUM CHLORIDE 100 ML/HR: .6; .31; .03; .02 INJECTION, SOLUTION INTRAVENOUS at 08:19

## 2025-06-21 RX ADMIN — TRIAMCINOLONE ACETONIDE 1 APPLICATION: 1 OINTMENT TOPICAL at 08:20

## 2025-06-21 RX ADMIN — CEFTRIAXONE 1 G: 1 INJECTION, SOLUTION INTRAVENOUS at 21:02

## 2025-06-21 RX ADMIN — ATORVASTATIN CALCIUM 40 MG: 40 TABLET, FILM COATED ORAL at 08:19

## 2025-06-21 RX ADMIN — SENNOSIDES 17.2 MG: 8.6 TABLET, FILM COATED ORAL at 08:18

## 2025-06-21 RX ADMIN — DILTIAZEM HYDROCHLORIDE 180 MG: 180 CAPSULE, COATED, EXTENDED RELEASE ORAL at 08:19

## 2025-06-21 SDOH — SOCIAL STABILITY: SOCIAL INSECURITY: DO YOU FEEL UNSAFE GOING BACK TO THE PLACE WHERE YOU ARE LIVING?: NO

## 2025-06-21 SDOH — SOCIAL STABILITY: SOCIAL INSECURITY: ARE THERE ANY APPARENT SIGNS OF INJURIES/BEHAVIORS THAT COULD BE RELATED TO ABUSE/NEGLECT?: NO

## 2025-06-21 SDOH — ECONOMIC STABILITY: INCOME INSECURITY: IN THE PAST 12 MONTHS HAS THE ELECTRIC, GAS, OIL, OR WATER COMPANY THREATENED TO SHUT OFF SERVICES IN YOUR HOME?: NO

## 2025-06-21 SDOH — ECONOMIC STABILITY: FOOD INSECURITY: WITHIN THE PAST 12 MONTHS, YOU WORRIED THAT YOUR FOOD WOULD RUN OUT BEFORE YOU GOT THE MONEY TO BUY MORE.: NEVER TRUE

## 2025-06-21 SDOH — SOCIAL STABILITY: SOCIAL INSECURITY: ARE YOU OR HAVE YOU BEEN THREATENED OR ABUSED PHYSICALLY, EMOTIONALLY, OR SEXUALLY BY ANYONE?: NO

## 2025-06-21 SDOH — ECONOMIC STABILITY: FOOD INSECURITY: WITHIN THE PAST 12 MONTHS, THE FOOD YOU BOUGHT JUST DIDN'T LAST AND YOU DIDN'T HAVE MONEY TO GET MORE.: NEVER TRUE

## 2025-06-21 SDOH — SOCIAL STABILITY: SOCIAL INSECURITY: WITHIN THE LAST YEAR, HAVE YOU BEEN AFRAID OF YOUR PARTNER OR EX-PARTNER?: NO

## 2025-06-21 SDOH — SOCIAL STABILITY: SOCIAL INSECURITY: WITHIN THE LAST YEAR, HAVE YOU BEEN HUMILIATED OR EMOTIONALLY ABUSED IN OTHER WAYS BY YOUR PARTNER OR EX-PARTNER?: NO

## 2025-06-21 SDOH — SOCIAL STABILITY: SOCIAL INSECURITY: HAS ANYONE EVER THREATENED TO HURT YOUR FAMILY OR YOUR PETS?: NO

## 2025-06-21 SDOH — SOCIAL STABILITY: SOCIAL INSECURITY: HAVE YOU HAD THOUGHTS OF HARMING ANYONE ELSE?: NO

## 2025-06-21 SDOH — SOCIAL STABILITY: SOCIAL INSECURITY: DOES ANYONE TRY TO KEEP YOU FROM HAVING/CONTACTING OTHER FRIENDS OR DOING THINGS OUTSIDE YOUR HOME?: NO

## 2025-06-21 SDOH — SOCIAL STABILITY: SOCIAL INSECURITY: DO YOU FEEL ANYONE HAS EXPLOITED OR TAKEN ADVANTAGE OF YOU FINANCIALLY OR OF YOUR PERSONAL PROPERTY?: NO

## 2025-06-21 SDOH — SOCIAL STABILITY: SOCIAL INSECURITY: WERE YOU ABLE TO COMPLETE ALL THE BEHAVIORAL HEALTH SCREENINGS?: YES

## 2025-06-21 SDOH — SOCIAL STABILITY: SOCIAL INSECURITY: ABUSE: ADULT

## 2025-06-21 ASSESSMENT — COGNITIVE AND FUNCTIONAL STATUS - GENERAL
STANDING UP FROM CHAIR USING ARMS: A LITTLE
PATIENT BASELINE BEDBOUND: NO
DAILY ACTIVITIY SCORE: 24
MOBILITY SCORE: 23
DAILY ACTIVITIY SCORE: 24
STANDING UP FROM CHAIR USING ARMS: A LITTLE
MOBILITY SCORE: 23

## 2025-06-21 ASSESSMENT — PAIN DESCRIPTION - DESCRIPTORS: DESCRIPTORS: DULL

## 2025-06-21 ASSESSMENT — LIFESTYLE VARIABLES
SKIP TO QUESTIONS 9-10: 1
AUDIT-C TOTAL SCORE: 0
HOW MANY STANDARD DRINKS CONTAINING ALCOHOL DO YOU HAVE ON A TYPICAL DAY: PATIENT DOES NOT DRINK
HOW OFTEN DO YOU HAVE 6 OR MORE DRINKS ON ONE OCCASION: NEVER
HOW OFTEN DO YOU HAVE A DRINK CONTAINING ALCOHOL: NEVER
AUDIT-C TOTAL SCORE: 0

## 2025-06-21 ASSESSMENT — ACTIVITIES OF DAILY LIVING (ADL)
PATIENT'S MEMORY ADEQUATE TO SAFELY COMPLETE DAILY ACTIVITIES?: YES
TOILETING: INDEPENDENT
JUDGMENT_ADEQUATE_SAFELY_COMPLETE_DAILY_ACTIVITIES: YES
FEEDING YOURSELF: INDEPENDENT
WALKS IN HOME: INDEPENDENT
LACK_OF_TRANSPORTATION: NO
DRESSING YOURSELF: INDEPENDENT
GROOMING: INDEPENDENT
ASSISTIVE_DEVICE: EYEGLASSES
ADEQUATE_TO_COMPLETE_ADL: YES
BATHING: INDEPENDENT
HEARING - LEFT EAR: FUNCTIONAL
HEARING - RIGHT EAR: FUNCTIONAL

## 2025-06-21 ASSESSMENT — PATIENT HEALTH QUESTIONNAIRE - PHQ9
2. FEELING DOWN, DEPRESSED OR HOPELESS: NOT AT ALL
SUM OF ALL RESPONSES TO PHQ9 QUESTIONS 1 & 2: 0
1. LITTLE INTEREST OR PLEASURE IN DOING THINGS: NOT AT ALL

## 2025-06-21 ASSESSMENT — PAIN SCALES - GENERAL
PAINLEVEL_OUTOF10: 5 - MODERATE PAIN
PAINLEVEL_OUTOF10: 0 - NO PAIN
PAINLEVEL_OUTOF10: 0 - NO PAIN

## 2025-06-21 ASSESSMENT — PAIN DESCRIPTION - ORIENTATION: ORIENTATION: LEFT

## 2025-06-21 ASSESSMENT — PAIN - FUNCTIONAL ASSESSMENT
PAIN_FUNCTIONAL_ASSESSMENT: 0-10
PAIN_FUNCTIONAL_ASSESSMENT: 0-10

## 2025-06-21 ASSESSMENT — PAIN DESCRIPTION - LOCATION: LOCATION: BACK

## 2025-06-21 NOTE — CARE PLAN
The patient's goals for the shift include      The clinical goals for the shift include pt will remain safe and free from injury; pain controlled with interventions      Problem: Pain  Goal: Walks with improved pain control throughout the shift  Outcome: Progressing     Problem: Pain - Adult  Goal: Verbalizes/displays adequate comfort level or baseline comfort level  Outcome: Progressing     Problem: Safety - Adult  Goal: Free from fall injury  Outcome: Progressing

## 2025-06-21 NOTE — CONSULTS
Reason For Consult  Renal pelvic calculi, hydro    History Of Present Illness  Millie Riley is a 79 y.o. female presenting with lower abdominal and L flank pain, which started around 1-2 AM this morning. She also developed black stools a little later today. She does have a history of a-fib and is on Eliquis (last dose around 1 AM this morning). She denies any nausea, vomiting, fevers, chills, diarrhea or constipation. She feels like she has been urinating her normal amount - denies dysuria, frequency, urgency or dysuria. She has been eating and drinking normally as well.     In the ED, she is afebrile, tachycardic to 123 BPM and is not hypotensive. WBC 9.3, H/H 14.4/45.5, BUN 33, Cr 1.41 (baseline appears to be 0.94-1.09 over the past 4 months), GFR 38, PT/INR 14.4/1.3, lactate 2.3->3.1. UA positive for blood, 6-10 WBC. CT angio A/P revealed worsening left hydronephrosis and perinephric inflammatory fat stranding. There is appearance of numerous new non-obstructing left renal calculi and interval enlargement of a dominant renal pelvic calculus (2.1 cm x 1.1 cm) that extensive the UPJ and could be resulting in intermittent obstruction given delayed nephrogram. However, delayed nephrogram could also be result of globally poor renal function secondary to the aforementioned on a chronic basis. No areas of focal parenchymal hypo enhancement to suggest acute pyelonephritis. Due to this, urology was consulted.     Last comparison CT from 6/6/23 -> several left sided renal calculi, including a 1.4 cm stone in the left renal pelvis with minimal hydronephrosis of the left renal calyces.      Past Medical History  She has a past medical history of Abdominal pain (06/02/2023), Acute otitis externa (03/27/2024), Atypical chest pain (11/04/2022), Breast pain (03/27/2024), Cellulitis (03/27/2024), Choking (03/27/2024), Choking (03/27/2024), Cough (03/27/2024), COVID-19 (03/27/2024), Disorder (03/27/2024), Exposure  "keratoconjunctivitis of right eye (06/27/2023), Hyperlipidemia, Hypertension, Other conditions influencing health status, Pain in right axilla (03/27/2024), Personal history of COVID-19 (06/15/2022), Personal history of other diseases of the musculoskeletal system and connective tissue, Personal history of other diseases of the nervous system and sense organs, and Ulcer of ankle (Multi) (03/27/2024).    Surgical History  She has a past surgical history that includes Total knee arthroplasty (06/27/2013) and US guided thyroid biopsy (3/10/2014).     Social History  She reports that she has never smoked. She has never used smokeless tobacco. She reports current alcohol use. She reports that she does not use drugs.    Family History  Family History[1]     Allergies  Patient has no known allergies.    Review of Systems  URINARY: Denies frequency, urgency, dysuria or hematuria.     Physical Exam  Constitutional: Awake/alert/oriented x3, no distress, cooperative.  Skin: Warm and dry.  Eyes: EOMI.  ENMT: Mucus membranes moist, no apparent injury.  Head/Neck: Neck supple, no apparent injury.  Respiratory: Unlabored breathing.  Cardiovascular: Tachycardic (in a-fib)  GI: Non distended, soft, obese, mildly diffusely tender to palpation.  : Voiding independently. Mild left CVA tenderness, no right sided CVA tenderness.   Musculoskeletal: ROM intact, normal strength.  Extremities: FIELDS.  Neurological: Alert and oriented x3, no focal deficits.  Psychological: Appropriate mood and behavior.     Last Recorded Vitals  Blood pressure 164/77, pulse (!) 123, temperature 36.8 °C (98.3 °F), temperature source Oral, resp. rate (!) 27, height 1.651 m (5' 5\"), weight 113 kg (250 lb), SpO2 95%.    Relevant Results  Results for orders placed or performed during the hospital encounter of 06/20/25 (from the past 24 hours)   ECG 12 lead   Result Value Ref Range    Ventricular Rate 101 BPM    QRS Duration 80 ms    QT Interval 340 ms    QTC " Calculation(Bazett) 440 ms    R Axis -56 degrees    T Axis 98 degrees    QRS Count 17 beats    Q Onset 214 ms    T Offset 384 ms    QTC Fredericia 404 ms   CBC and Auto Differential   Result Value Ref Range    WBC 9.3 4.4 - 11.3 x10*3/uL    nRBC 0.0 0.0 - 0.0 /100 WBCs    RBC 4.85 4.00 - 5.20 x10*6/uL    Hemoglobin 14.4 12.0 - 16.0 g/dL    Hematocrit 45.5 36.0 - 46.0 %    MCV 94 80 - 100 fL    MCH 29.7 26.0 - 34.0 pg    MCHC 31.6 (L) 32.0 - 36.0 g/dL    RDW 13.4 11.5 - 14.5 %    Platelets 265 150 - 450 x10*3/uL    Neutrophils % 79.8 40.0 - 80.0 %    Immature Granulocytes %, Automated 0.3 0.0 - 0.9 %    Lymphocytes % 11.2 13.0 - 44.0 %    Monocytes % 8.3 2.0 - 10.0 %    Eosinophils % 0.1 0.0 - 6.0 %    Basophils % 0.3 0.0 - 2.0 %    Neutrophils Absolute 7.42 (H) 1.60 - 5.50 x10*3/uL    Immature Granulocytes Absolute, Automated 0.03 0.00 - 0.50 x10*3/uL    Lymphocytes Absolute 1.04 0.80 - 3.00 x10*3/uL    Monocytes Absolute 0.77 0.05 - 0.80 x10*3/uL    Eosinophils Absolute 0.01 0.00 - 0.40 x10*3/uL    Basophils Absolute 0.03 0.00 - 0.10 x10*3/uL   Type and Screen   Result Value Ref Range    ABO TYPE O     Rh TYPE POS     ANTIBODY SCREEN NEG    Protime-INR   Result Value Ref Range    Protime 14.4 (H) 9.8 - 12.4 seconds    INR 1.3 (H) 0.9 - 1.1   Basic metabolic panel   Result Value Ref Range    Glucose 123 (H) 74 - 99 mg/dL    Sodium 136 136 - 145 mmol/L    Potassium 4.8 3.5 - 5.3 mmol/L    Chloride 101 98 - 107 mmol/L    Bicarbonate 27 21 - 32 mmol/L    Anion Gap 13 10 - 20 mmol/L    Urea Nitrogen 33 (H) 6 - 23 mg/dL    Creatinine 1.41 (H) 0.50 - 1.05 mg/dL    eGFR 38 (L) >60 mL/min/1.73m*2    Calcium 9.1 8.6 - 10.3 mg/dL   Hepatic function panel   Result Value Ref Range    Albumin 4.2 3.4 - 5.0 g/dL    Bilirubin, Total 0.6 0.0 - 1.2 mg/dL    Bilirubin, Direct 0.1 0.0 - 0.3 mg/dL    Alkaline Phosphatase 94 33 - 136 U/L    ALT 29 7 - 45 U/L    AST 23 9 - 39 U/L    Total Protein 7.6 6.4 - 8.2 g/dL   Iron and TIBC    Result Value Ref Range    Iron 56 35 - 150 ug/dL    UIBC 336 110 - 370 ug/dL    TIBC 392 240 - 445 ug/dL    % Saturation 14 (L) 25 - 45 %   Blood Gas Venous Full Panel   Result Value Ref Range    POCT pH, Venous 7.39 7.33 - 7.43 pH    POCT pCO2, Venous 47 41 - 51 mm Hg    POCT pO2, Venous 42 35 - 45 mm Hg    POCT SO2, Venous 72 45 - 75 %    POCT Oxy Hemoglobin, Venous 70.4 45.0 - 75.0 %    POCT Hematocrit Calculated, Venous 45.0 36.0 - 46.0 %    POCT Sodium, Venous 135 (L) 136 - 145 mmol/L    POCT Potassium, Venous 5.0 3.5 - 5.3 mmol/L    POCT Chloride, Venous 99 98 - 107 mmol/L    POCT Ionized Calicum, Venous 1.18 1.10 - 1.33 mmol/L    POCT Glucose, Venous 130 (H) 74 - 99 mg/dL    POCT Lactate, Venous 2.3 (H) 0.4 - 2.0 mmol/L    POCT Base Excess, Venous 2.7 -2.0 - 3.0 mmol/L    POCT HCO3 Calculated, Venous 28.5 (H) 22.0 - 26.0 mmol/L    POCT Hemoglobin, Venous 14.9 12.0 - 16.0 g/dL    POCT Anion Gap, Venous 13.0 10.0 - 25.0 mmol/L    Patient Temperature 37.0 degrees Celsius    FiO2 21 %   Gray Top   Result Value Ref Range    Extra Tube Hold for add-ons.    Blood Gas Lactic Acid, Venous   Result Value Ref Range    POCT Lactate, Venous 3.1 (H) 0.4 - 2.0 mmol/L   Urinalysis with Reflex Culture and Microscopic   Result Value Ref Range    Color, Urine Light-Yellow Light-Yellow, Yellow, Dark-Yellow    Appearance, Urine Clear Clear    Specific Gravity, Urine 1.042 (N) 1.005 - 1.035    pH, Urine 6.0 5.0, 5.5, 6.0, 6.5, 7.0, 7.5, 8.0    Protein, Urine NEGATIVE NEGATIVE, 10 (TRACE), 20 (TRACE) mg/dL    Glucose, Urine Normal Normal mg/dL    Blood, Urine 0.5 (2+) (A) NEGATIVE mg/dL    Ketones, Urine NEGATIVE NEGATIVE mg/dL    Bilirubin, Urine NEGATIVE NEGATIVE mg/dL    Urobilinogen, Urine Normal Normal mg/dL    Nitrite, Urine NEGATIVE NEGATIVE    Leukocyte Esterase, Urine NEGATIVE NEGATIVE   Urinalysis Microscopic   Result Value Ref Range    WBC, Urine 6-10 (A) 1-5, NONE /HPF    RBC, Urine >20 (A) NONE, 1-2, 3-5 /HPF     Mucus, Urine FEW Reference range not established. /LPF      CT angio abdomen pelvis w and or wo IV IV contrast  Result Date: 6/20/2025  Interpreted By:  Albaro Norman, STUDY: CT ANGIO ABDOMEN PELVIS W AND/OR WO IV IV CONTRAST;  6/20/2025 7:19 pm   INDICATION: Signs/Symptoms:diffuse abd pain black stool.     COMPARISON: 06/06/2023   ACCESSION NUMBER(S): [id number]   ORDERING CLINICIAN: DARRICK GARCIA   TECHNIQUE: Contiguous non-contrast axial images of the abdomen and pelvis were initially obtained.   Thin-section axial images of the abdomen and pelvis were obtained in the arterial and portal venous phase after intravenous administration of iodinated contrast. Sagittal and coronal reformatted images were provided. MIP images and 3D reconstructions were created on an independent workstation and reviewed.   FINDINGS: VASCULATURE:   ABDOMINAL AORTA: No abdominal aortic aneurysm or dissection. No significant abdominal aortic atherosclerosis.   ABDOMINAL and PELVIC ARTERIES:  No hemodynamically significant stenosis or occlusion.   VENOUS: No significant abnormality.     CT ABDOMEN/PELVIS:   LOWER CHEST: Mild aortic valvular calcification. Mild cardiomegaly with biatrial dilatation. Mild bibasilar atelectasis without pleural effusions.   ABDOMINAL WALL: No significant abnormality.   LIVER: There are multiple too-small-to-characterize hypodensities in the liver statistically representing benign cysts. There also simple cysts measuring up to 1.8 cm. No suspicious lesions.   BILE DUCTS: No significant intrahepatic or extrahepatic dilatation.   GALLBLADDER: No significant abnormality.   PANCREAS: No significant abnormality.   SPLEEN: No significant abnormality.   ADRENALS: No significant abnormality.   KIDNEYS, URETERS, BLADDER: There is a 0.6 cm nonobstructing right renal calculus. There has been interval appearance of numerous new left renal calculi and enlargement of the pre-existing renal pelvic calculus. For  example, a 2.1 cm x 1.1 cm renal pelvic calculus previously measured 1.6 cm x 0.9 cm. There is a new 1 cm x 1.7 cm renal pelvis calculus MX innumerable small calculi lying in the pelvis and within the calices. There is moderate left hydronephrosis, increased from prior study, as well as a delayed nephrogram. However, dominant renal pelvic calculus does not appear to be resulting in direct obstruction at the ureteral pelvic junction. There is interval worsening of left perinephric fat stranding. 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   REPRODUCTIVE ORGANS: No significant abnormality.   RETROPERITONEUM/LYMPH NODES: No acute retroperitoneal abnormality. No enlarged lymph nodes.   BOWEL/PERITONEUM: There is small bowel and colonic diverticulosis without evidence for diverticulitis.  No evidence of active gastrointestinal hemorrhage. No inflammatory bowel wall thickening or dilatation. Normal appendix.   No ascites, free air, or fluid collection.     MUSCULOSKELETAL: No acute osseous abnormality. Healed fractures of right L2, L3, L4 transverse processes. Multilevel degenerative changes of the thoracic and lumbar spine without high-grade canal stenosis. No suspicious osseous lesion.       1. No evidence of active gastrointestinal hemorrhage.   2. Worsening left hydronephrosis and perinephric inflammatory fat stranding. There is appearance of numerous new nonobstructing left renal calculi and interval enlargement of a dominant renal pelvic calculus that extensive the UPJ and could be resulting in intermittent obstruction given delayed nephrogram. However, delayed nephrogram could also be result of globally poor renal function secondary to the aforementioned on a chronic basis. No areas of focal parenchymal hypoenhancement to suggest acute pyelonephritis though correlation with clinical factors intraorally.   3. Nonobstructing subcentimeter  right renal calculus.   4. Diverticulosis of the small bowel and colon without evidence of acute diverticulitis.   MACRO: None.   Signed by: Albaro Norman 6/20/2025 7:44 PM Dictation workstation:   [id number]    ECG 12 lead  Result Date: 6/20/2025  Atrial fibrillation with rapid ventricular response Left axis deviation Abnormal QRS-T angle, consider primary T wave abnormality Abnormal ECG When compared with ECG of 20-JAN-2025 11:16, QRS axis Shifted left        Assessment/Plan   - Voiding independently. Mild left CVA tenderness, no right sided CVA tenderness.   - Pain control as needed (reports improvement of pain upon my assessment)  - IVF  - IV antibiotics  - Trend lactate  - Ok for a diet from a surgical perspective. Will need medical optimization prior to any surgical procedure - possible stent Sunday  - Recommend holding Eliquis, if able  - AM labs    Discussed with Dr. Solis.     I spent 45 minutes in the professional and overall care of this patient.      MANUEL Ramos-CNP         [1] No family history on file.

## 2025-06-21 NOTE — ED PROVIDER NOTES
Emergency Department Provider Note       HPI: []  79-year-old female history of high BMI, A-fib on Eliquis, dyslipidemia, thyroid disease, lymphedema comes in with abdominal pain.  She states that for last 4 hours developed abdominal pain pain localized mid abdomen.  Goes to the back.  Especially flank.  Also had dark stool.  No black stool.  No nausea vomiting.  No chills no fever.  No hematemesis melena medic easy no hemoptysis no recent travel hospitalization or antibiotic use.    Past history: High BMI, lymphedema, hypertension, thyroid disease, A-fib, hypertension    Social: Patient denies current tobacco alcohol drug use  REVIEW OF SYSTEMS:    GENERAL.: No weight loss, fatigue, anorexia, insomnia, fever.    EYES: No vision loss, double vision, drainage, eye pain.    ENT: No pharyngitis, dry mouth.    CARDIOPULMONARY: No chest pain, palpitations, syncope, near syncope. No shortness of breath, cough, hemoptysis.    GI: Positive for abdominal pain, change in bowel habits, melena, hematemesis, hematochezia, nausea, vomiting, diarrhea.    : No discharge, dysuria, frequency, urgency, hematuria.    MS: No limb pain, joint pain, joint swelling.    SKIN: No rashes.    PSYCH: No depression, anxiety, suicidality, homicidality.    Review of systems is otherwise negative unless stated above or in history of present illness.  Social history, family history, allergies reviewed.  PHYSICAL EXAM:    GENERAL: Vitals noted, no distress. Alert and oriented  x 3. Non-toxic.  High BMI    EENT: TMs clear. Posterior oropharynx unremarkable. No meningismus. No LAD.     NECK: Supple. Nontender. No midline tenderness.     CARDIAC: Tachycardic with an irregularly irregular rate and rhythm. No murmurs rubs or gallops. No JVD    PULMONARY: Lungs clear bilaterally with good aeration. No wheezes rales or rhonchi. No respiratory distress.     ABDOMEN: Soft, nonsurgical.  Tender periumbilical and mid abdomen no rebound or guarding positive  left CVA tenderness no peritoneal signs. Normoactive bowel sounds. No pulsatile masses.     EXTREMITIES: 1+ bilateral symmetric nonpitting peripheral edema. Negative Homans bilaterally, no cords.  2+ bounding pulses well-perfused.    SKIN: No rash. Intact.     NEURO: No focal neurologic deficits, NIH score of 0. Cranial nerves normal as tested from II through XII.     MEDICAL DECISION MAKING:  EKG on my interpretation was atrial fibrillation normal axis rate about 110 with no acute ischemic change.      CBC shows no leukocytosis stable hemoglobin, chemistries show acute kidney injury, LFTs unremarkable, lactate elevated, UA shows a questionable UTI with blood, INR is 1.3, abdominal CAT scan does show multiple stones in the left renal pelvis with hydronephrosis and perinephric stranding.  Another finding Spee refer to radiology report.    Treatment in the ED: Upon arrival established given IV fluids, pancultured, given intravenous morphine Zofran oral Tylenol and intravenous Rocephin.    Consults: Urology consulted.    ED course: Patient remained stable hemodynamic.    Impression: #1 kidney stone on the left side, #2 question of pyelonephritis, #3 atrial fibrillation RVR    Plans and MDM: 79-year-old white female history of A-fib on Eliquis high BMI comes in with abdominal pain and left flank pain workup is concerning for left-sided kidney stone in the left renal pelvis with hydronephrosis and perinephric stranding concerning for possible infection/pyonephritis concern for septic shock bacteremia all is low.  Patient missed a dose of Cardizem despite she is in A-fib RVR we will resume her Cardizem to control heart rate.  Urology been consulted and patient will be hospitalized for further care.                                                     Burke Henderson MD  06/20/25 [id number]       Burke Henderson MD  06/20/25 [id number]

## 2025-06-21 NOTE — H&P
History Of Present Illness  Millie Riley is a 79 y.o. female presenting with abdominal pain on the L side  No fever  No chills  She does have a fib on cardizem and eliquis   ER workup DEON, left sided kidney stone, L hydronephrosis  Started on rocephin and admitted  Seen urology and rec to hold eliquis for possible procedure tomorrow      Past Medical History  She has a past medical history of Abdominal pain (06/02/2023), Acute otitis externa (03/27/2024), Atypical chest pain (11/04/2022), Breast pain (03/27/2024), Cellulitis (03/27/2024), Choking (03/27/2024), Choking (03/27/2024), Cough (03/27/2024), COVID-19 (03/27/2024), Disorder (03/27/2024), 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    Surgical History  She has a past surgical history that includes Total knee arthroplasty (06/27/2013) and US guided thyroid biopsy (3/10/2014).     Social History  She reports that she has never smoked. She has never used smokeless tobacco. She reports current alcohol use. She reports that she does not use drugs.    Family History  Family History[1]     Allergies  Patient has no known allergies.    Review of Systems     No chest pain   No shortness of breath  No cough  No fever  No chills  No nausea vomitign or constipation   No wt loss  No change in urine     Physical Exam     Well developed well nurished, obese  No distress  Ao 3  Face symmetrical   Neck no jvd no bruit  Chest clear  CVS regular  Ext arline lymphedema  Abdo soft nontender bs active, no masses  Cns alert appropriate able to move ext   Skin intact  Psych normal affect    Last Recorded Vitals  BP  143/83 (BP Location: Right arm, Patient Position: Lying)   Pulse 101   Temp 36.5 °C (97.7 °F) (Temporal)   Resp 16   Wt 113 kg (250 lb)   SpO2 94%     Relevant Results    Scheduled medications  Scheduled Medications[2]  Continuous medications  Continuous Medications[3]  PRN medications  PRN Medications[4]  Results for orders placed or performed during the hospital encounter of 06/20/25 (from the past 96 hours)   ECG 12 lead   Result Value Ref Range    Ventricular Rate 101 BPM    QRS Duration 80 ms    QT Interval 340 ms    QTC Calculation(Bazett) 440 ms    R Axis -56 degrees    T Axis 98 degrees    QRS Count 17 beats    Q Onset 214 ms    T Offset 384 ms    QTC Fredericia 404 ms   CBC and Auto Differential   Result Value Ref Range    WBC 9.3 4.4 - 11.3 x10*3/uL    nRBC 0.0 0.0 - 0.0 /100 WBCs    RBC 4.85 4.00 - 5.20 x10*6/uL    Hemoglobin 14.4 12.0 - 16.0 g/dL    Hematocrit 45.5 36.0 - 46.0 %    MCV 94 80 - 100 fL    MCH 29.7 26.0 - 34.0 pg    MCHC 31.6 (L) 32.0 - 36.0 g/dL    RDW 13.4 11.5 - 14.5 %    Platelets 265 150 - 450 x10*3/uL    Neutrophils % 79.8 40.0 - 80.0 %    Immature Granulocytes %, Automated 0.3 0.0 - 0.9 %    Lymphocytes % 11.2 13.0 - 44.0 %    Monocytes % 8.3 2.0 - 10.0 %    Eosinophils % 0.1 0.0 - 6.0 %    Basophils % 0.3 0.0 - 2.0 %    Neutrophils Absolute 7.42 (H) 1.60 - 5.50 x10*3/uL    Immature Granulocytes Absolute, Automated 0.03 0.00 - 0.50 x10*3/uL    Lymphocytes Absolute 1.04 0.80 - 3.00 x10*3/uL    Monocytes Absolute 0.77 0.05 - 0.80 x10*3/uL    Eosinophils Absolute 0.01 0.00 - 0.40 x10*3/uL    Basophils Absolute 0.03 0.00 - 0.10 x10*3/uL   Type and Screen   Result Value Ref Range    ABO TYPE O     Rh TYPE POS     ANTIBODY SCREEN NEG    Protime-INR   Result Value Ref Range    Protime 14.4 (H) 9.8 - 12.4 seconds    INR 1.3 (H) 0.9 - 1.1   Basic metabolic panel   Result Value Ref Range    Glucose 123 (H) 74 - 99 mg/dL    Sodium 136 136 - 145 mmol/L    Potassium 4.8 3.5 - 5.3 mmol/L     Chloride 101 98 - 107 mmol/L    Bicarbonate 27 21 - 32 mmol/L    Anion Gap 13 10 - 20 mmol/L    Urea Nitrogen 33 (H) 6 - 23 mg/dL    Creatinine 1.41 (H) 0.50 - 1.05 mg/dL    eGFR 38 (L) >60 mL/min/1.73m*2    Calcium 9.1 8.6 - 10.3 mg/dL   Hepatic function panel   Result Value Ref Range    Albumin 4.2 3.4 - 5.0 g/dL    Bilirubin, Total 0.6 0.0 - 1.2 mg/dL    Bilirubin, Direct 0.1 0.0 - 0.3 mg/dL    Alkaline Phosphatase 94 33 - 136 U/L    ALT 29 7 - 45 U/L    AST 23 9 - 39 U/L    Total Protein 7.6 6.4 - 8.2 g/dL   Iron and TIBC   Result Value Ref Range    Iron 56 35 - 150 ug/dL    UIBC 336 110 - 370 ug/dL    TIBC 392 240 - 445 ug/dL    % Saturation 14 (L) 25 - 45 %   Blood Gas Venous Full Panel   Result Value Ref Range    POCT pH, Venous 7.39 7.33 - 7.43 pH    POCT pCO2, Venous 47 41 - 51 mm Hg    POCT pO2, Venous 42 35 - 45 mm Hg    POCT SO2, Venous 72 45 - 75 %    POCT Oxy Hemoglobin, Venous 70.4 45.0 - 75.0 %    POCT Hematocrit Calculated, Venous 45.0 36.0 - 46.0 %    POCT Sodium, Venous 135 (L) 136 - 145 mmol/L    POCT Potassium, Venous 5.0 3.5 - 5.3 mmol/L    POCT Chloride, Venous 99 98 - 107 mmol/L    POCT Ionized Calicum, Venous 1.18 1.10 - 1.33 mmol/L    POCT Glucose, Venous 130 (H) 74 - 99 mg/dL    POCT Lactate, Venous 2.3 (H) 0.4 - 2.0 mmol/L    POCT Base Excess, Venous 2.7 -2.0 - 3.0 mmol/L    POCT HCO3 Calculated, Venous 28.5 (H) 22.0 - 26.0 mmol/L    POCT Hemoglobin, Venous 14.9 12.0 - 16.0 g/dL    POCT Anion Gap, Venous 13.0 10.0 - 25.0 mmol/L    Patient Temperature 37.0 degrees Celsius    FiO2 21 %   Gray Top   Result Value Ref Range    Extra Tube Hold for add-ons.    Blood Gas Lactic Acid, Venous   Result Value Ref Range    POCT Lactate, Venous 3.1 (H) 0.4 - 2.0 mmol/L   Urinalysis with Reflex Culture and Microscopic   Result Value Ref Range    Color, Urine Light-Yellow Light-Yellow, Yellow, Dark-Yellow    Appearance, Urine Clear Clear    Specific Gravity, Urine 1.042 (N) 1.005 - 1.035    pH, Urine 6.0  5.0, 5.5, 6.0, 6.5, 7.0, 7.5, 8.0    Protein, Urine NEGATIVE NEGATIVE, 10 (TRACE), 20 (TRACE) mg/dL    Glucose, Urine Normal Normal mg/dL    Blood, Urine 0.5 (2+) (A) NEGATIVE mg/dL    Ketones, Urine NEGATIVE NEGATIVE mg/dL    Bilirubin, Urine NEGATIVE NEGATIVE mg/dL    Urobilinogen, Urine Normal Normal mg/dL    Nitrite, Urine NEGATIVE NEGATIVE    Leukocyte Esterase, Urine NEGATIVE NEGATIVE   Urinalysis Microscopic   Result Value Ref Range    WBC, Urine 6-10 (A) 1-5, NONE /HPF    RBC, Urine >20 (A) NONE, 1-2, 3-5 /HPF    Mucus, Urine FEW Reference range not established. /LPF   Blood Culture    Specimen: Peripheral Venipuncture; Blood culture   Result Value Ref Range    Blood Culture No growth at 1 day    Blood Culture    Specimen: Peripheral Venipuncture; Blood culture   Result Value Ref Range    Blood Culture No growth at 1 day    CBC   Result Value Ref Range    WBC 8.0 4.4 - 11.3 x10*3/uL    nRBC 0.0 0.0 - 0.0 /100 WBCs    RBC 4.47 4.00 - 5.20 x10*6/uL    Hemoglobin 13.3 12.0 - 16.0 g/dL    Hematocrit 40.6 36.0 - 46.0 %    MCV 91 80 - 100 fL    MCH 29.8 26.0 - 34.0 pg    MCHC 32.8 32.0 - 36.0 g/dL    RDW 13.7 11.5 - 14.5 %    Platelets 236 150 - 450 x10*3/uL   Basic Metabolic Panel   Result Value Ref Range    Glucose 105 (H) 74 - 99 mg/dL    Sodium 138 136 - 145 mmol/L    Potassium 4.4 3.5 - 5.3 mmol/L    Chloride 104 98 - 107 mmol/L    Bicarbonate 22 21 - 32 mmol/L    Anion Gap 16 10 - 20 mmol/L    Urea Nitrogen 31 (H) 6 - 23 mg/dL    Creatinine 1.58 (H) 0.50 - 1.05 mg/dL    eGFR 33 (L) >60 mL/min/1.73m*2    Calcium 9.0 8.6 - 10.3 mg/dL           Assessment/Plan   Assessment & Plan  Pyelonephritis    Benign essential HTN    Hyperlipidemia    Hypothyroidism    Lymphedema    Atrial fibrillation (Multi)      Resume home meds  Hold eliquis  Monitor heart rate  Iv rocephin  Pending culture  Urology following   Monitor renal function        José Wren MD         [1] No family history on file.  [2] [Held by  provider] apixaban, 5 mg, oral, q12h HELGA  atorvastatin, 40 mg, oral, Daily  cefTRIAXone, 1 g, intravenous, q24h  cholecalciferol, 50 mcg, oral, Daily  dilTIAZem CD, 180 mg, oral, Daily  DULoxetine, 60 mg, oral, Daily  levothyroxine, 25 mcg, oral, Daily before breakfast  morphine, 4 mg, intravenous, Once  ondansetron, 4 mg, intravenous, Once  pantoprazole, 40 mg, oral, Daily before breakfast   Or  pantoprazole, 40 mg, intravenous, Daily before breakfast  sennosides, 2 tablet, oral, BID  triamcinolone, 1 Application, Topical, BID  [3]    [4] PRN medications: acetaminophen **OR** acetaminophen **OR** acetaminophen, acetaminophen **OR** acetaminophen **OR** acetaminophen, guaiFENesin, melatonin, ondansetron **OR** ondansetron

## 2025-06-21 NOTE — CARE PLAN
The patient's goals for the shift include      The clinical goals for the shift include pt will remain free from falls      Problem: Pain  Goal: Takes deep breaths with improved pain control throughout the shift  Outcome: Progressing     Problem: Pain - Adult  Goal: Verbalizes/displays adequate comfort level or baseline comfort level  Outcome: Progressing     Problem: Safety - Adult  Goal: Free from fall injury  Outcome: Progressing

## 2025-06-21 NOTE — H&P (VIEW-ONLY)
Reason For Consult  Renal pelvic calculi, hydro    History Of Present Illness  Millie Riley is a 79 y.o. female presenting with lower abdominal and L flank pain, which started around 1-2 AM this morning. She also developed black stools a little later today. She does have a history of a-fib and is on Eliquis (last dose around 1 AM this morning). She denies any nausea, vomiting, fevers, chills, diarrhea or constipation. She feels like she has been urinating her normal amount - denies dysuria, frequency, urgency or dysuria. She has been eating and drinking normally as well.     In the ED, she is afebrile, tachycardic to 123 BPM and is not hypotensive. WBC 9.3, H/H 14.4/45.5, BUN 33, Cr 1.41 (baseline appears to be 0.94-1.09 over the past 4 months), GFR 38, PT/INR 14.4/1.3, lactate 2.3->3.1. UA positive for blood, 6-10 WBC. CT angio A/P revealed worsening left hydronephrosis and perinephric inflammatory fat stranding. There is appearance of numerous new non-obstructing left renal calculi and interval enlargement of a dominant renal pelvic calculus (2.1 cm x 1.1 cm) that extensive the UPJ and could be resulting in intermittent obstruction given delayed nephrogram. However, delayed nephrogram could also be result of globally poor renal function secondary to the aforementioned on a chronic basis. No areas of focal parenchymal hypo enhancement to suggest acute pyelonephritis. Due to this, urology was consulted.     Last comparison CT from 6/6/23 -> several left sided renal calculi, including a 1.4 cm stone in the left renal pelvis with minimal hydronephrosis of the left renal calyces.      Past Medical History  She has a past medical history of Abdominal pain (06/02/2023), Acute otitis externa (03/27/2024), Atypical chest pain (11/04/2022), Breast pain (03/27/2024), Cellulitis (03/27/2024), Choking (03/27/2024), Choking (03/27/2024), Cough (03/27/2024), COVID-19 (03/27/2024), Disorder (03/27/2024), Exposure  "keratoconjunctivitis of right eye (06/27/2023), Hyperlipidemia, Hypertension, Other conditions influencing health status, Pain in right axilla (03/27/2024), Personal history of COVID-19 (06/15/2022), Personal history of other diseases of the musculoskeletal system and connective tissue, Personal history of other diseases of the nervous system and sense organs, and Ulcer of ankle (Multi) (03/27/2024).    Surgical History  She has a past surgical history that includes Total knee arthroplasty (06/27/2013) and US guided thyroid biopsy (3/10/2014).     Social History  She reports that she has never smoked. She has never used smokeless tobacco. She reports current alcohol use. She reports that she does not use drugs.    Family History  Family History[1]     Allergies  Patient has no known allergies.    Review of Systems  URINARY: Denies frequency, urgency, dysuria or hematuria.     Physical Exam  Constitutional: Awake/alert/oriented x3, no distress, cooperative.  Skin: Warm and dry.  Eyes: EOMI.  ENMT: Mucus membranes moist, no apparent injury.  Head/Neck: Neck supple, no apparent injury.  Respiratory: Unlabored breathing.  Cardiovascular: Tachycardic (in a-fib)  GI: Non distended, soft, obese, mildly diffusely tender to palpation.  : Voiding independently. Mild left CVA tenderness, no right sided CVA tenderness.   Musculoskeletal: ROM intact, normal strength.  Extremities: FIELDS.  Neurological: Alert and oriented x3, no focal deficits.  Psychological: Appropriate mood and behavior.     Last Recorded Vitals  Blood pressure 164/77, pulse (!) 123, temperature 36.8 °C (98.3 °F), temperature source Oral, resp. rate (!) 27, height 1.651 m (5' 5\"), weight 113 kg (250 lb), SpO2 95%.    Relevant Results  Results for orders placed or performed during the hospital encounter of 06/20/25 (from the past 24 hours)   ECG 12 lead   Result Value Ref Range    Ventricular Rate 101 BPM    QRS Duration 80 ms    QT Interval 340 ms    QTC " Calculation(Bazett) 440 ms    R Axis -56 degrees    T Axis 98 degrees    QRS Count 17 beats    Q Onset 214 ms    T Offset 384 ms    QTC Fredericia 404 ms   CBC and Auto Differential   Result Value Ref Range    WBC 9.3 4.4 - 11.3 x10*3/uL    nRBC 0.0 0.0 - 0.0 /100 WBCs    RBC 4.85 4.00 - 5.20 x10*6/uL    Hemoglobin 14.4 12.0 - 16.0 g/dL    Hematocrit 45.5 36.0 - 46.0 %    MCV 94 80 - 100 fL    MCH 29.7 26.0 - 34.0 pg    MCHC 31.6 (L) 32.0 - 36.0 g/dL    RDW 13.4 11.5 - 14.5 %    Platelets 265 150 - 450 x10*3/uL    Neutrophils % 79.8 40.0 - 80.0 %    Immature Granulocytes %, Automated 0.3 0.0 - 0.9 %    Lymphocytes % 11.2 13.0 - 44.0 %    Monocytes % 8.3 2.0 - 10.0 %    Eosinophils % 0.1 0.0 - 6.0 %    Basophils % 0.3 0.0 - 2.0 %    Neutrophils Absolute 7.42 (H) 1.60 - 5.50 x10*3/uL    Immature Granulocytes Absolute, Automated 0.03 0.00 - 0.50 x10*3/uL    Lymphocytes Absolute 1.04 0.80 - 3.00 x10*3/uL    Monocytes Absolute 0.77 0.05 - 0.80 x10*3/uL    Eosinophils Absolute 0.01 0.00 - 0.40 x10*3/uL    Basophils Absolute 0.03 0.00 - 0.10 x10*3/uL   Type and Screen   Result Value Ref Range    ABO TYPE O     Rh TYPE POS     ANTIBODY SCREEN NEG    Protime-INR   Result Value Ref Range    Protime 14.4 (H) 9.8 - 12.4 seconds    INR 1.3 (H) 0.9 - 1.1   Basic metabolic panel   Result Value Ref Range    Glucose 123 (H) 74 - 99 mg/dL    Sodium 136 136 - 145 mmol/L    Potassium 4.8 3.5 - 5.3 mmol/L    Chloride 101 98 - 107 mmol/L    Bicarbonate 27 21 - 32 mmol/L    Anion Gap 13 10 - 20 mmol/L    Urea Nitrogen 33 (H) 6 - 23 mg/dL    Creatinine 1.41 (H) 0.50 - 1.05 mg/dL    eGFR 38 (L) >60 mL/min/1.73m*2    Calcium 9.1 8.6 - 10.3 mg/dL   Hepatic function panel   Result Value Ref Range    Albumin 4.2 3.4 - 5.0 g/dL    Bilirubin, Total 0.6 0.0 - 1.2 mg/dL    Bilirubin, Direct 0.1 0.0 - 0.3 mg/dL    Alkaline Phosphatase 94 33 - 136 U/L    ALT 29 7 - 45 U/L    AST 23 9 - 39 U/L    Total Protein 7.6 6.4 - 8.2 g/dL   Iron and TIBC    Result Value Ref Range    Iron 56 35 - 150 ug/dL    UIBC 336 110 - 370 ug/dL    TIBC 392 240 - 445 ug/dL    % Saturation 14 (L) 25 - 45 %   Blood Gas Venous Full Panel   Result Value Ref Range    POCT pH, Venous 7.39 7.33 - 7.43 pH    POCT pCO2, Venous 47 41 - 51 mm Hg    POCT pO2, Venous 42 35 - 45 mm Hg    POCT SO2, Venous 72 45 - 75 %    POCT Oxy Hemoglobin, Venous 70.4 45.0 - 75.0 %    POCT Hematocrit Calculated, Venous 45.0 36.0 - 46.0 %    POCT Sodium, Venous 135 (L) 136 - 145 mmol/L    POCT Potassium, Venous 5.0 3.5 - 5.3 mmol/L    POCT Chloride, Venous 99 98 - 107 mmol/L    POCT Ionized Calicum, Venous 1.18 1.10 - 1.33 mmol/L    POCT Glucose, Venous 130 (H) 74 - 99 mg/dL    POCT Lactate, Venous 2.3 (H) 0.4 - 2.0 mmol/L    POCT Base Excess, Venous 2.7 -2.0 - 3.0 mmol/L    POCT HCO3 Calculated, Venous 28.5 (H) 22.0 - 26.0 mmol/L    POCT Hemoglobin, Venous 14.9 12.0 - 16.0 g/dL    POCT Anion Gap, Venous 13.0 10.0 - 25.0 mmol/L    Patient Temperature 37.0 degrees Celsius    FiO2 21 %   Gray Top   Result Value Ref Range    Extra Tube Hold for add-ons.    Blood Gas Lactic Acid, Venous   Result Value Ref Range    POCT Lactate, Venous 3.1 (H) 0.4 - 2.0 mmol/L   Urinalysis with Reflex Culture and Microscopic   Result Value Ref Range    Color, Urine Light-Yellow Light-Yellow, Yellow, Dark-Yellow    Appearance, Urine Clear Clear    Specific Gravity, Urine 1.042 (N) 1.005 - 1.035    pH, Urine 6.0 5.0, 5.5, 6.0, 6.5, 7.0, 7.5, 8.0    Protein, Urine NEGATIVE NEGATIVE, 10 (TRACE), 20 (TRACE) mg/dL    Glucose, Urine Normal Normal mg/dL    Blood, Urine 0.5 (2+) (A) NEGATIVE mg/dL    Ketones, Urine NEGATIVE NEGATIVE mg/dL    Bilirubin, Urine NEGATIVE NEGATIVE mg/dL    Urobilinogen, Urine Normal Normal mg/dL    Nitrite, Urine NEGATIVE NEGATIVE    Leukocyte Esterase, Urine NEGATIVE NEGATIVE   Urinalysis Microscopic   Result Value Ref Range    WBC, Urine 6-10 (A) 1-5, NONE /HPF    RBC, Urine >20 (A) NONE, 1-2, 3-5 /HPF     Mucus, Urine FEW Reference range not established. /LPF      CT angio abdomen pelvis w and or wo IV IV contrast  Result Date: 6/20/2025  Interpreted By:  Albaro Norman, STUDY: CT ANGIO ABDOMEN PELVIS W AND/OR WO IV IV CONTRAST;  6/20/2025 7:19 pm   INDICATION: Signs/Symptoms:diffuse abd pain black stool.     COMPARISON: 06/06/2023   ACCESSION NUMBER(S): [id number]   ORDERING CLINICIAN: DARRICK GARCIA   TECHNIQUE: Contiguous non-contrast axial images of the abdomen and pelvis were initially obtained.   Thin-section axial images of the abdomen and pelvis were obtained in the arterial and portal venous phase after intravenous administration of iodinated contrast. Sagittal and coronal reformatted images were provided. MIP images and 3D reconstructions were created on an independent workstation and reviewed.   FINDINGS: VASCULATURE:   ABDOMINAL AORTA: No abdominal aortic aneurysm or dissection. No significant abdominal aortic atherosclerosis.   ABDOMINAL and PELVIC ARTERIES:  No hemodynamically significant stenosis or occlusion.   VENOUS: No significant abnormality.     CT ABDOMEN/PELVIS:   LOWER CHEST: Mild aortic valvular calcification. Mild cardiomegaly with biatrial dilatation. Mild bibasilar atelectasis without pleural effusions.   ABDOMINAL WALL: No significant abnormality.   LIVER: There are multiple too-small-to-characterize hypodensities in the liver statistically representing benign cysts. There also simple cysts measuring up to 1.8 cm. No suspicious lesions.   BILE DUCTS: No significant intrahepatic or extrahepatic dilatation.   GALLBLADDER: No significant abnormality.   PANCREAS: No significant abnormality.   SPLEEN: No significant abnormality.   ADRENALS: No significant abnormality.   KIDNEYS, URETERS, BLADDER: There is a 0.6 cm nonobstructing right renal calculus. There has been interval appearance of numerous new left renal calculi and enlargement of the pre-existing renal pelvic calculus. For  example, a 2.1 cm x 1.1 cm renal pelvic calculus previously measured 1.6 cm x 0.9 cm. There is a new 1 cm x 1.7 cm renal pelvis calculus MX innumerable small calculi lying in the pelvis and within the calices. There is moderate left hydronephrosis, increased from prior study, as well as a delayed nephrogram. However, dominant renal pelvic calculus does not appear to be resulting in direct obstruction at the ureteral pelvic junction. There is interval worsening of left perinephric fat stranding. 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   REPRODUCTIVE ORGANS: No significant abnormality.   RETROPERITONEUM/LYMPH NODES: No acute retroperitoneal abnormality. No enlarged lymph nodes.   BOWEL/PERITONEUM: There is small bowel and colonic diverticulosis without evidence for diverticulitis.  No evidence of active gastrointestinal hemorrhage. No inflammatory bowel wall thickening or dilatation. Normal appendix.   No ascites, free air, or fluid collection.     MUSCULOSKELETAL: No acute osseous abnormality. Healed fractures of right L2, L3, L4 transverse processes. Multilevel degenerative changes of the thoracic and lumbar spine without high-grade canal stenosis. No suspicious osseous lesion.       1. No evidence of active gastrointestinal hemorrhage.   2. Worsening left hydronephrosis and perinephric inflammatory fat stranding. There is appearance of numerous new nonobstructing left renal calculi and interval enlargement of a dominant renal pelvic calculus that extensive the UPJ and could be resulting in intermittent obstruction given delayed nephrogram. However, delayed nephrogram could also be result of globally poor renal function secondary to the aforementioned on a chronic basis. No areas of focal parenchymal hypoenhancement to suggest acute pyelonephritis though correlation with clinical factors intraorally.   3. Nonobstructing subcentimeter  right renal calculus.   4. Diverticulosis of the small bowel and colon without evidence of acute diverticulitis.   MACRO: None.   Signed by: Albaro Norman 6/20/2025 7:44 PM Dictation workstation:   [id number]    ECG 12 lead  Result Date: 6/20/2025  Atrial fibrillation with rapid ventricular response Left axis deviation Abnormal QRS-T angle, consider primary T wave abnormality Abnormal ECG When compared with ECG of 20-JAN-2025 11:16, QRS axis Shifted left        Assessment/Plan   - Voiding independently. Mild left CVA tenderness, no right sided CVA tenderness.   - Pain control as needed (reports improvement of pain upon my assessment)  - IVF  - IV antibiotics  - Trend lactate  - Ok for a diet from a surgical perspective. Will need medical optimization prior to any surgical procedure - possible stent Sunday  - Recommend holding Eliquis, if able  - AM labs    Discussed with Dr. Solis.     I spent 45 minutes in the professional and overall care of this patient.      MANUEL Ramos-CNP         [1] No family history on file.

## 2025-06-21 NOTE — PROGRESS NOTES
"Millie Riley is a 79 y.o. female on day 1 of admission presenting with Pyelonephritis.    Subjective   She is doing quite well this morning.  She said her flank pain is currently well-managed.  Denies any further complaints.    Objective   PE:  Constitutional: A&Ox3, calm and cooperative, NAD  Cardiovascular: Tachycardic.  Respiratory/Thorax: Good symmetric chest expansion. No labored breathing.  Gastrointestinal: Abdomen slightly distended, soft  Genitourinary: Voiding independently   Extremities: No peripheral edema  Neurological: A&Ox3, No focal deficits  Psychological: Appropriate mood and behavior  Skin: Warm and dry    Last Recorded Vitals  Blood pressure 116/69, pulse (!) 116, temperature 36.8 °C (98.3 °F), temperature source Temporal, resp. rate 17, height 1.651 m (5' 5\"), weight 113 kg (250 lb), SpO2 97%.  Intake/Output last 3 Shifts:  I/O last 3 completed shifts:  In: 1049 (9.3 mL/kg) [IV Piggyback:1049]  Out: - (0 mL/kg)   Weight: 113.4 kg     Relevant Results  Scheduled medications  Scheduled Medications[1]  Continuous medications  Continuous Medications[2]  PRN medications  PRN Medications[3]    Results for orders placed or performed during the hospital encounter of 06/20/25 (from the past 24 hours)   ECG 12 lead   Result Value Ref Range    Ventricular Rate 101 BPM    QRS Duration 80 ms    QT Interval 340 ms    QTC Calculation(Bazett) 440 ms    R Axis -56 degrees    T Axis 98 degrees    QRS Count 17 beats    Q Onset 214 ms    T Offset 384 ms    QTC Fredericia 404 ms   CBC and Auto Differential   Result Value Ref Range    WBC 9.3 4.4 - 11.3 x10*3/uL    nRBC 0.0 0.0 - 0.0 /100 WBCs    RBC 4.85 4.00 - 5.20 x10*6/uL    Hemoglobin 14.4 12.0 - 16.0 g/dL    Hematocrit 45.5 36.0 - 46.0 %    MCV 94 80 - 100 fL    MCH 29.7 26.0 - 34.0 pg    MCHC 31.6 (L) 32.0 - 36.0 g/dL    RDW 13.4 11.5 - 14.5 %    Platelets 265 150 - 450 x10*3/uL    Neutrophils % 79.8 40.0 - 80.0 %    Immature Granulocytes %, Automated 0.3 " 0.0 - 0.9 %    Lymphocytes % 11.2 13.0 - 44.0 %    Monocytes % 8.3 2.0 - 10.0 %    Eosinophils % 0.1 0.0 - 6.0 %    Basophils % 0.3 0.0 - 2.0 %    Neutrophils Absolute 7.42 (H) 1.60 - 5.50 x10*3/uL    Immature Granulocytes Absolute, Automated 0.03 0.00 - 0.50 x10*3/uL    Lymphocytes Absolute 1.04 0.80 - 3.00 x10*3/uL    Monocytes Absolute 0.77 0.05 - 0.80 x10*3/uL    Eosinophils Absolute 0.01 0.00 - 0.40 x10*3/uL    Basophils Absolute 0.03 0.00 - 0.10 x10*3/uL   Type and Screen   Result Value Ref Range    ABO TYPE O     Rh TYPE POS     ANTIBODY SCREEN NEG    Protime-INR   Result Value Ref Range    Protime 14.4 (H) 9.8 - 12.4 seconds    INR 1.3 (H) 0.9 - 1.1   Basic metabolic panel   Result Value Ref Range    Glucose 123 (H) 74 - 99 mg/dL    Sodium 136 136 - 145 mmol/L    Potassium 4.8 3.5 - 5.3 mmol/L    Chloride 101 98 - 107 mmol/L    Bicarbonate 27 21 - 32 mmol/L    Anion Gap 13 10 - 20 mmol/L    Urea Nitrogen 33 (H) 6 - 23 mg/dL    Creatinine 1.41 (H) 0.50 - 1.05 mg/dL    eGFR 38 (L) >60 mL/min/1.73m*2    Calcium 9.1 8.6 - 10.3 mg/dL   Hepatic function panel   Result Value Ref Range    Albumin 4.2 3.4 - 5.0 g/dL    Bilirubin, Total 0.6 0.0 - 1.2 mg/dL    Bilirubin, Direct 0.1 0.0 - 0.3 mg/dL    Alkaline Phosphatase 94 33 - 136 U/L    ALT 29 7 - 45 U/L    AST 23 9 - 39 U/L    Total Protein 7.6 6.4 - 8.2 g/dL   Iron and TIBC   Result Value Ref Range    Iron 56 35 - 150 ug/dL    UIBC 336 110 - 370 ug/dL    TIBC 392 240 - 445 ug/dL    % Saturation 14 (L) 25 - 45 %   Blood Gas Venous Full Panel   Result Value Ref Range    POCT pH, Venous 7.39 7.33 - 7.43 pH    POCT pCO2, Venous 47 41 - 51 mm Hg    POCT pO2, Venous 42 35 - 45 mm Hg    POCT SO2, Venous 72 45 - 75 %    POCT Oxy Hemoglobin, Venous 70.4 45.0 - 75.0 %    POCT Hematocrit Calculated, Venous 45.0 36.0 - 46.0 %    POCT Sodium, Venous 135 (L) 136 - 145 mmol/L    POCT Potassium, Venous 5.0 3.5 - 5.3 mmol/L    POCT Chloride, Venous 99 98 - 107 mmol/L    POCT  Ionized Calicum, Venous 1.18 1.10 - 1.33 mmol/L    POCT Glucose, Venous 130 (H) 74 - 99 mg/dL    POCT Lactate, Venous 2.3 (H) 0.4 - 2.0 mmol/L    POCT Base Excess, Venous 2.7 -2.0 - 3.0 mmol/L    POCT HCO3 Calculated, Venous 28.5 (H) 22.0 - 26.0 mmol/L    POCT Hemoglobin, Venous 14.9 12.0 - 16.0 g/dL    POCT Anion Gap, Venous 13.0 10.0 - 25.0 mmol/L    Patient Temperature 37.0 degrees Celsius    FiO2 21 %   Gray Top   Result Value Ref Range    Extra Tube Hold for add-ons.    Blood Gas Lactic Acid, Venous   Result Value Ref Range    POCT Lactate, Venous 3.1 (H) 0.4 - 2.0 mmol/L   Urinalysis with Reflex Culture and Microscopic   Result Value Ref Range    Color, Urine Light-Yellow Light-Yellow, Yellow, Dark-Yellow    Appearance, Urine Clear Clear    Specific Gravity, Urine 1.042 (N) 1.005 - 1.035    pH, Urine 6.0 5.0, 5.5, 6.0, 6.5, 7.0, 7.5, 8.0    Protein, Urine NEGATIVE NEGATIVE, 10 (TRACE), 20 (TRACE) mg/dL    Glucose, Urine Normal Normal mg/dL    Blood, Urine 0.5 (2+) (A) NEGATIVE mg/dL    Ketones, Urine NEGATIVE NEGATIVE mg/dL    Bilirubin, Urine NEGATIVE NEGATIVE mg/dL    Urobilinogen, Urine Normal Normal mg/dL    Nitrite, Urine NEGATIVE NEGATIVE    Leukocyte Esterase, Urine NEGATIVE NEGATIVE   Urinalysis Microscopic   Result Value Ref Range    WBC, Urine 6-10 (A) 1-5, NONE /HPF    RBC, Urine >20 (A) NONE, 1-2, 3-5 /HPF    Mucus, Urine FEW Reference range not established. /LPF   Blood Culture    Specimen: Peripheral Venipuncture; Blood culture   Result Value Ref Range    Blood Culture Loaded on Instrument - Culture in progress    Blood Culture    Specimen: Peripheral Venipuncture; Blood culture   Result Value Ref Range    Blood Culture Loaded on Instrument - Culture in progress    CBC   Result Value Ref Range    WBC 8.0 4.4 - 11.3 x10*3/uL    nRBC 0.0 0.0 - 0.0 /100 WBCs    RBC 4.47 4.00 - 5.20 x10*6/uL    Hemoglobin 13.3 12.0 - 16.0 g/dL    Hematocrit 40.6 36.0 - 46.0 %    MCV 91 80 - 100 fL    MCH 29.8 26.0 -  34.0 pg    MCHC 32.8 32.0 - 36.0 g/dL    RDW 13.7 11.5 - 14.5 %    Platelets 236 150 - 450 x10*3/uL   Basic Metabolic Panel   Result Value Ref Range    Glucose 105 (H) 74 - 99 mg/dL    Sodium 138 136 - 145 mmol/L    Potassium 4.4 3.5 - 5.3 mmol/L    Chloride 104 98 - 107 mmol/L    Bicarbonate 22 21 - 32 mmol/L    Anion Gap 16 10 - 20 mmol/L    Urea Nitrogen 31 (H) 6 - 23 mg/dL    Creatinine 1.58 (H) 0.50 - 1.05 mg/dL    eGFR 33 (L) >60 mL/min/1.73m*2    Calcium 9.0 8.6 - 10.3 mg/dL     Assessment & Plan    Labs reviewed.  Hemoglobin is stable, no leukocytosis, creatinine subtle uptrend to 1.58 from 1.41.    Plan: Left hydronephrosis, multiple L renal calculi  - IVF as able  - Hold eliquis if able  - IV Abx  - Trend lab work  - NPO MN for possible cystoscopy, ureteroscopy tomorrow.  Will need more medically optimized in particular her ongoing tachycardia.    We will follow-up with her tomorrow.    I spent 35 minutes in the professional and overall care of this patient.    Elkin Xiong PA-C         [1] [Held by provider] apixaban, 5 mg, oral, q12h HELGA  atorvastatin, 40 mg, oral, Daily  cefTRIAXone, 1 g, intravenous, q24h  cholecalciferol, 50 mcg, oral, Daily  dilTIAZem CD, 180 mg, oral, Daily  DULoxetine, 60 mg, oral, Daily  levothyroxine, 25 mcg, oral, Daily before breakfast  morphine, 4 mg, intravenous, Once  ondansetron, 4 mg, intravenous, Once  pantoprazole, 40 mg, oral, Daily before breakfast   Or  pantoprazole, 40 mg, intravenous, Daily before breakfast  sennosides, 2 tablet, oral, BID  triamcinolone, 1 Application, Topical, BID  [2] lactated Ringer's, 100 mL/hr, Last Rate: 100 mL/hr (06/21/25 0819)  [3] PRN medications: acetaminophen **OR** acetaminophen **OR** acetaminophen, acetaminophen **OR** acetaminophen **OR** acetaminophen, guaiFENesin, melatonin, ondansetron **OR** ondansetron

## 2025-06-22 VITALS
RESPIRATION RATE: 16 BRPM | OXYGEN SATURATION: 96 % | SYSTOLIC BLOOD PRESSURE: 124 MMHG | HEART RATE: 115 BPM | HEIGHT: 65 IN | WEIGHT: 250 LBS | TEMPERATURE: 97 F | DIASTOLIC BLOOD PRESSURE: 68 MMHG | BODY MASS INDEX: 41.65 KG/M2

## 2025-06-22 PROBLEM — N20.0 KIDNEY STONE ON LEFT SIDE: Status: ACTIVE | Noted: 2025-06-20

## 2025-06-22 LAB
ANION GAP SERPL CALC-SCNC: 17 MMOL/L (ref 10–20)
BACTERIA BLD CULT: NORMAL
BACTERIA BLD CULT: NORMAL
BUN SERPL-MCNC: 28 MG/DL (ref 6–23)
CALCIUM SERPL-MCNC: 9 MG/DL (ref 8.6–10.3)
CHLORIDE SERPL-SCNC: 103 MMOL/L (ref 98–107)
CO2 SERPL-SCNC: 21 MMOL/L (ref 21–32)
CREAT SERPL-MCNC: 1.44 MG/DL (ref 0.5–1.05)
EGFRCR SERPLBLD CKD-EPI 2021: 37 ML/MIN/1.73M*2
ERYTHROCYTE [DISTWIDTH] IN BLOOD BY AUTOMATED COUNT: 13.6 % (ref 11.5–14.5)
GLUCOSE SERPL-MCNC: 109 MG/DL (ref 74–99)
HCT VFR BLD AUTO: 40.2 % (ref 36–46)
HGB BLD-MCNC: 13 G/DL (ref 12–16)
MCH RBC QN AUTO: 29.3 PG (ref 26–34)
MCHC RBC AUTO-ENTMCNC: 32.3 G/DL (ref 32–36)
MCV RBC AUTO: 91 FL (ref 80–100)
NRBC BLD-RTO: 0 /100 WBCS (ref 0–0)
PLATELET # BLD AUTO: 221 X10*3/UL (ref 150–450)
POTASSIUM SERPL-SCNC: 4.6 MMOL/L (ref 3.5–5.3)
RBC # BLD AUTO: 4.44 X10*6/UL (ref 4–5.2)
SODIUM SERPL-SCNC: 136 MMOL/L (ref 136–145)
WBC # BLD AUTO: 9.9 X10*3/UL (ref 4.4–11.3)

## 2025-06-22 PROCEDURE — 2500000004 HC RX 250 GENERAL PHARMACY W/ HCPCS (ALT 636 FOR OP/ED): Performed by: FAMILY MEDICINE

## 2025-06-22 PROCEDURE — 36415 COLL VENOUS BLD VENIPUNCTURE: CPT

## 2025-06-22 PROCEDURE — 80048 BASIC METABOLIC PNL TOTAL CA: CPT | Performed by: STUDENT IN AN ORGANIZED HEALTH CARE EDUCATION/TRAINING PROGRAM

## 2025-06-22 PROCEDURE — 1200000002 HC GENERAL ROOM WITH TELEMETRY DAILY

## 2025-06-22 PROCEDURE — 99232 SBSQ HOSP IP/OBS MODERATE 35: CPT

## 2025-06-22 PROCEDURE — 99223 1ST HOSP IP/OBS HIGH 75: CPT | Performed by: INTERNAL MEDICINE

## 2025-06-22 PROCEDURE — 85027 COMPLETE CBC AUTOMATED: CPT

## 2025-06-22 PROCEDURE — 2500000001 HC RX 250 WO HCPCS SELF ADMINISTERED DRUGS (ALT 637 FOR MEDICARE OP): Performed by: NURSE PRACTITIONER

## 2025-06-22 PROCEDURE — 2500000001 HC RX 250 WO HCPCS SELF ADMINISTERED DRUGS (ALT 637 FOR MEDICARE OP): Performed by: FAMILY MEDICINE

## 2025-06-22 RX ORDER — DILTIAZEM HYDROCHLORIDE 180 MG/1
180 CAPSULE, COATED, EXTENDED RELEASE ORAL 2 TIMES DAILY
Status: DISCONTINUED | OUTPATIENT
Start: 2025-06-22 | End: 2025-06-26

## 2025-06-22 RX ORDER — HEPARIN SODIUM 5000 [USP'U]/ML
7500 INJECTION, SOLUTION INTRAVENOUS; SUBCUTANEOUS EVERY 8 HOURS SCHEDULED
Status: COMPLETED | OUTPATIENT
Start: 2025-06-22 | End: 2025-06-22

## 2025-06-22 RX ORDER — CEFTRIAXONE 1 G/50ML
1 INJECTION, SOLUTION INTRAVENOUS EVERY 24 HOURS
Status: COMPLETED | OUTPATIENT
Start: 2025-06-22 | End: 2025-06-26

## 2025-06-22 RX ADMIN — ATORVASTATIN CALCIUM 40 MG: 40 TABLET, FILM COATED ORAL at 08:30

## 2025-06-22 RX ADMIN — PANTOPRAZOLE SODIUM 40 MG: 40 INJECTION, POWDER, FOR SOLUTION INTRAVENOUS at 06:19

## 2025-06-22 RX ADMIN — ACETAMINOPHEN 650 MG: 325 TABLET, FILM COATED ORAL at 20:45

## 2025-06-22 RX ADMIN — DILTIAZEM HYDROCHLORIDE 180 MG: 180 CAPSULE, COATED, EXTENDED RELEASE ORAL at 08:30

## 2025-06-22 RX ADMIN — CEFTRIAXONE 1 G: 1 INJECTION, SOLUTION INTRAVENOUS at 21:26

## 2025-06-22 RX ADMIN — TRIAMCINOLONE ACETONIDE 1 APPLICATION: 1 OINTMENT TOPICAL at 21:27

## 2025-06-22 RX ADMIN — DILTIAZEM HYDROCHLORIDE 180 MG: 180 CAPSULE, COATED, EXTENDED RELEASE ORAL at 20:45

## 2025-06-22 RX ADMIN — HEPARIN SODIUM 7500 UNITS: 5000 INJECTION, SOLUTION INTRAVENOUS; SUBCUTANEOUS at 14:51

## 2025-06-22 RX ADMIN — HEPARIN SODIUM 7500 UNITS: 5000 INJECTION, SOLUTION INTRAVENOUS; SUBCUTANEOUS at 20:50

## 2025-06-22 RX ADMIN — Medication 50 MCG: at 08:30

## 2025-06-22 RX ADMIN — DULOXETINE 60 MG: 30 CAPSULE, DELAYED RELEASE ORAL at 08:30

## 2025-06-22 RX ADMIN — TRIAMCINOLONE ACETONIDE 1 APPLICATION: 1 OINTMENT TOPICAL at 08:33

## 2025-06-22 ASSESSMENT — COGNITIVE AND FUNCTIONAL STATUS - GENERAL
CLIMB 3 TO 5 STEPS WITH RAILING: A LITTLE
MOBILITY SCORE: 23
DAILY ACTIVITIY SCORE: 24

## 2025-06-22 ASSESSMENT — PAIN SCALES - GENERAL: PAINLEVEL_OUTOF10: 0 - NO PAIN

## 2025-06-22 NOTE — PROGRESS NOTES
INPATIENT PROGRESS NOTES    PRIMARY SERVICE: José Wren MD   6/22/2025  1:52 PM    INTERVAL HPI: Pt feels OK, does have left sided flank pain  And does have constiaption    MEDICATIONS:  Scheduled medications  Scheduled Medications[1]  Continuous medications  Continuous Medications[2]  PRN medications  PRN Medications[3]      PHYSICAL EXAM:       6/21/2025     4:00 AM 6/21/2025     8:18 AM 6/21/2025    11:15 AM 6/21/2025     4:23 PM 6/21/2025     8:01 PM 6/22/2025     8:22 AM 6/22/2025    12:49 PM   Vitals   Systolic 140 116 127 143 132 140 132   Diastolic 66 69 82 83 79 79 65   BP Location Right arm Right arm Right arm Right arm Right arm Right arm Right arm   Heart Rate 103 116 102 101 119 110 105   Temp 36.7 °C (98 °F) 36.8 °C (98.3 °F) 36.4 °C (97.6 °F) 36.5 °C (97.7 °F) 36.8 °C (98.3 °F) 37.1 °C (98.8 °F) 36.9 °C (98.4 °F)   Resp 20 17 17 16 16 16           PHYSICAL EXAMINATION:    General appearance: well-hydrated, well nourished, obese  Skin: skin color, texture, turgor normal,   HEENT: Anicteric sclera.  Oropharynx mucosa moist  Neck: Supple, no adenopathy;   Back: no pain to palpation over spine or costovertebral angles,   Lungs: clear to auscultation, no wheezing or rhonchi  Heart: RRR without murmur, gallop, or rubs.   Abdomen: Abdomen soft, non-tender. Bowel sounds normal. No masses, organomegaly  Extremities: lymphedema  Musculoskeletal: Spine range of motion normal. Muscular strength intact  Neuro: Oriented X  3    DATA: CBC, Coags, BMP, Mg, Phos   Scheduled medications  Scheduled Medications[4]  Continuous medications  Continuous Medications[5]  PRN medications  PRN Medications[6]  Results for orders placed or performed during the hospital encounter of 06/20/25 (from the past 96 hours)   ECG 12 lead   Result Value Ref Range    Ventricular Rate 101 BPM    QRS Duration 80 ms    QT Interval 340 ms    QTC Calculation(Bazett) 440 ms    R Axis -56 degrees    T Axis 98 degrees    QRS Count 17 beats    Q  Onset 214 ms    T Offset 384 ms    QTC Fredericia 404 ms   CBC and Auto Differential   Result Value Ref Range    WBC 9.3 4.4 - 11.3 x10*3/uL    nRBC 0.0 0.0 - 0.0 /100 WBCs    RBC 4.85 4.00 - 5.20 x10*6/uL    Hemoglobin 14.4 12.0 - 16.0 g/dL    Hematocrit 45.5 36.0 - 46.0 %    MCV 94 80 - 100 fL    MCH 29.7 26.0 - 34.0 pg    MCHC 31.6 (L) 32.0 - 36.0 g/dL    RDW 13.4 11.5 - 14.5 %    Platelets 265 150 - 450 x10*3/uL    Neutrophils % 79.8 40.0 - 80.0 %    Immature Granulocytes %, Automated 0.3 0.0 - 0.9 %    Lymphocytes % 11.2 13.0 - 44.0 %    Monocytes % 8.3 2.0 - 10.0 %    Eosinophils % 0.1 0.0 - 6.0 %    Basophils % 0.3 0.0 - 2.0 %    Neutrophils Absolute 7.42 (H) 1.60 - 5.50 x10*3/uL    Immature Granulocytes Absolute, Automated 0.03 0.00 - 0.50 x10*3/uL    Lymphocytes Absolute 1.04 0.80 - 3.00 x10*3/uL    Monocytes Absolute 0.77 0.05 - 0.80 x10*3/uL    Eosinophils Absolute 0.01 0.00 - 0.40 x10*3/uL    Basophils Absolute 0.03 0.00 - 0.10 x10*3/uL   Type and Screen   Result Value Ref Range    ABO TYPE O     Rh TYPE POS     ANTIBODY SCREEN NEG    Protime-INR   Result Value Ref Range    Protime 14.4 (H) 9.8 - 12.4 seconds    INR 1.3 (H) 0.9 - 1.1   Basic metabolic panel   Result Value Ref Range    Glucose 123 (H) 74 - 99 mg/dL    Sodium 136 136 - 145 mmol/L    Potassium 4.8 3.5 - 5.3 mmol/L    Chloride 101 98 - 107 mmol/L    Bicarbonate 27 21 - 32 mmol/L    Anion Gap 13 10 - 20 mmol/L    Urea Nitrogen 33 (H) 6 - 23 mg/dL    Creatinine 1.41 (H) 0.50 - 1.05 mg/dL    eGFR 38 (L) >60 mL/min/1.73m*2    Calcium 9.1 8.6 - 10.3 mg/dL   Hepatic function panel   Result Value Ref Range    Albumin 4.2 3.4 - 5.0 g/dL    Bilirubin, Total 0.6 0.0 - 1.2 mg/dL    Bilirubin, Direct 0.1 0.0 - 0.3 mg/dL    Alkaline Phosphatase 94 33 - 136 U/L    ALT 29 7 - 45 U/L    AST 23 9 - 39 U/L    Total Protein 7.6 6.4 - 8.2 g/dL   Iron and TIBC   Result Value Ref Range    Iron 56 35 - 150 ug/dL    UIBC 336 110 - 370 ug/dL    TIBC 392 240 - 445  ug/dL    % Saturation 14 (L) 25 - 45 %   Blood Gas Venous Full Panel   Result Value Ref Range    POCT pH, Venous 7.39 7.33 - 7.43 pH    POCT pCO2, Venous 47 41 - 51 mm Hg    POCT pO2, Venous 42 35 - 45 mm Hg    POCT SO2, Venous 72 45 - 75 %    POCT Oxy Hemoglobin, Venous 70.4 45.0 - 75.0 %    POCT Hematocrit Calculated, Venous 45.0 36.0 - 46.0 %    POCT Sodium, Venous 135 (L) 136 - 145 mmol/L    POCT Potassium, Venous 5.0 3.5 - 5.3 mmol/L    POCT Chloride, Venous 99 98 - 107 mmol/L    POCT Ionized Calicum, Venous 1.18 1.10 - 1.33 mmol/L    POCT Glucose, Venous 130 (H) 74 - 99 mg/dL    POCT Lactate, Venous 2.3 (H) 0.4 - 2.0 mmol/L    POCT Base Excess, Venous 2.7 -2.0 - 3.0 mmol/L    POCT HCO3 Calculated, Venous 28.5 (H) 22.0 - 26.0 mmol/L    POCT Hemoglobin, Venous 14.9 12.0 - 16.0 g/dL    POCT Anion Gap, Venous 13.0 10.0 - 25.0 mmol/L    Patient Temperature 37.0 degrees Celsius    FiO2 21 %   Gray Top   Result Value Ref Range    Extra Tube Hold for add-ons.    Blood Gas Lactic Acid, Venous   Result Value Ref Range    POCT Lactate, Venous 3.1 (H) 0.4 - 2.0 mmol/L   Urinalysis with Reflex Culture and Microscopic   Result Value Ref Range    Color, Urine Light-Yellow Light-Yellow, Yellow, Dark-Yellow    Appearance, Urine Clear Clear    Specific Gravity, Urine 1.042 (N) 1.005 - 1.035    pH, Urine 6.0 5.0, 5.5, 6.0, 6.5, 7.0, 7.5, 8.0    Protein, Urine NEGATIVE NEGATIVE, 10 (TRACE), 20 (TRACE) mg/dL    Glucose, Urine Normal Normal mg/dL    Blood, Urine 0.5 (2+) (A) NEGATIVE mg/dL    Ketones, Urine NEGATIVE NEGATIVE mg/dL    Bilirubin, Urine NEGATIVE NEGATIVE mg/dL    Urobilinogen, Urine Normal Normal mg/dL    Nitrite, Urine NEGATIVE NEGATIVE    Leukocyte Esterase, Urine NEGATIVE NEGATIVE   Urinalysis Microscopic   Result Value Ref Range    WBC, Urine 6-10 (A) 1-5, NONE /HPF    RBC, Urine >20 (A) NONE, 1-2, 3-5 /HPF    Mucus, Urine FEW Reference range not established. /LPF   Blood Culture    Specimen: Peripheral  Venipuncture; Blood culture   Result Value Ref Range    Blood Culture No growth at 1 day    Blood Culture    Specimen: Peripheral Venipuncture; Blood culture   Result Value Ref Range    Blood Culture No growth at 1 day    CBC   Result Value Ref Range    WBC 8.0 4.4 - 11.3 x10*3/uL    nRBC 0.0 0.0 - 0.0 /100 WBCs    RBC 4.47 4.00 - 5.20 x10*6/uL    Hemoglobin 13.3 12.0 - 16.0 g/dL    Hematocrit 40.6 36.0 - 46.0 %    MCV 91 80 - 100 fL    MCH 29.8 26.0 - 34.0 pg    MCHC 32.8 32.0 - 36.0 g/dL    RDW 13.7 11.5 - 14.5 %    Platelets 236 150 - 450 x10*3/uL   Basic Metabolic Panel   Result Value Ref Range    Glucose 105 (H) 74 - 99 mg/dL    Sodium 138 136 - 145 mmol/L    Potassium 4.4 3.5 - 5.3 mmol/L    Chloride 104 98 - 107 mmol/L    Bicarbonate 22 21 - 32 mmol/L    Anion Gap 16 10 - 20 mmol/L    Urea Nitrogen 31 (H) 6 - 23 mg/dL    Creatinine 1.58 (H) 0.50 - 1.05 mg/dL    eGFR 33 (L) >60 mL/min/1.73m*2    Calcium 9.0 8.6 - 10.3 mg/dL   Basic metabolic panel   Result Value Ref Range    Glucose 109 (H) 74 - 99 mg/dL    Sodium 136 136 - 145 mmol/L    Potassium 4.6 3.5 - 5.3 mmol/L    Chloride 103 98 - 107 mmol/L    Bicarbonate 21 21 - 32 mmol/L    Anion Gap 17 10 - 20 mmol/L    Urea Nitrogen 28 (H) 6 - 23 mg/dL    Creatinine 1.44 (H) 0.50 - 1.05 mg/dL    eGFR 37 (L) >60 mL/min/1.73m*2    Calcium 9.0 8.6 - 10.3 mg/dL   CBC   Result Value Ref Range    WBC 9.9 4.4 - 11.3 x10*3/uL    nRBC 0.0 0.0 - 0.0 /100 WBCs    RBC 4.44 4.00 - 5.20 x10*6/uL    Hemoglobin 13.0 12.0 - 16.0 g/dL    Hematocrit 40.2 36.0 - 46.0 %    MCV 91 80 - 100 fL    MCH 29.3 26.0 - 34.0 pg    MCHC 32.3 32.0 - 36.0 g/dL    RDW 13.6 11.5 - 14.5 %    Platelets 221 150 - 450 x10*3/uL           ASSESSMENT AND PLAN:     Assessment & Plan  Pyelonephritis    Benign essential HTN    Hyperlipidemia    Hypothyroidism    Lymphedema    Atrial fibrillation (Multi)    Kidney stone on left side      Plan of care discussed with: Provider, RN, Patient  Bowel regimen  Cardio  cleared dw cardio  Holding ruth  Osmani give subcutaneous heparin for ppx  Dw pt.    José Wren MD     Office: [phone]         [1] [Held by provider] apixaban, 5 mg, oral, q12h HELGA  atorvastatin, 40 mg, oral, Daily  cefTRIAXone, 1 g, intravenous, q24h  cholecalciferol, 50 mcg, oral, Daily  dilTIAZem CD, 180 mg, oral, Daily  DULoxetine, 60 mg, oral, Daily  levothyroxine, 25 mcg, oral, Daily before breakfast  morphine, 4 mg, intravenous, Once  ondansetron, 4 mg, intravenous, Once  pantoprazole, 40 mg, oral, Daily before breakfast   Or  pantoprazole, 40 mg, intravenous, Daily before breakfast  sennosides, 2 tablet, oral, BID  triamcinolone, 1 Application, Topical, BID  [2]    [3] PRN medications: acetaminophen **OR** acetaminophen **OR** acetaminophen, acetaminophen **OR** acetaminophen **OR** acetaminophen, guaiFENesin, melatonin, ondansetron **OR** ondansetron  [4] [Held by provider] apixaban, 5 mg, oral, q12h HELGA  atorvastatin, 40 mg, oral, Daily  cefTRIAXone, 1 g, intravenous, q24h  cholecalciferol, 50 mcg, oral, Daily  dilTIAZem CD, 180 mg, oral, Daily  DULoxetine, 60 mg, oral, Daily  levothyroxine, 25 mcg, oral, Daily before breakfast  morphine, 4 mg, intravenous, Once  ondansetron, 4 mg, intravenous, Once  pantoprazole, 40 mg, oral, Daily before breakfast   Or  pantoprazole, 40 mg, intravenous, Daily before breakfast  sennosides, 2 tablet, oral, BID  triamcinolone, 1 Application, Topical, BID  [5]    [6] PRN medications: acetaminophen **OR** acetaminophen **OR** acetaminophen, acetaminophen **OR** acetaminophen **OR** acetaminophen, guaiFENesin, melatonin, ondansetron **OR** ondansetron

## 2025-06-22 NOTE — PROGRESS NOTES
"Millie Riley is a 79 y.o. female on day 2 of admission presenting with Pyelonephritis.    Subjective   Millie Riley is a 79 y.o. female on day 2 of admission presenting with Pyelonephritis.    Subjective   Uneventful night.  Creatinine subtle downtrend.  However she still reporting approximately 8 out of 10 left flank pains.  No further complaints.    Objective   PE:  Constitutional: A&Ox3, calm and cooperative, NAD  Cardiovascular: Tachycardic.  Respiratory/Thorax: Good symmetric chest expansion. No labored breathing.  Gastrointestinal: Abdomen slightly distended, soft  Genitourinary: Voiding independently, L flank pain  Extremities: No peripheral edema  Neurological: A&Ox3, No focal deficits  Psychological: Appropriate mood and behavior  Skin: Warm and dry    Last Recorded Vitals  Blood pressure 132/65, pulse 105, temperature 36.9 °C (98.4 °F), temperature source Temporal, resp. rate 16, height 1.651 m (5' 5\"), weight 113 kg (250 lb), SpO2 95%.  Intake/Output last 3 Shifts:  I/O last 3 completed shifts:  In: 2080.7 (18.3 mL/kg) [I.V.:981.7 (8.7 mL/kg); IV Piggyback:1099]  Out: - (0 mL/kg)   Weight: 113.4 kg     Relevant Results  Scheduled medications  Scheduled Medications[1]  Continuous medications  Continuous Medications[2]  PRN medications  PRN Medications[3]    Results for orders placed or performed during the hospital encounter of 06/20/25 (from the past 24 hours)   Basic metabolic panel   Result Value Ref Range    Glucose 109 (H) 74 - 99 mg/dL    Sodium 136 136 - 145 mmol/L    Potassium 4.6 3.5 - 5.3 mmol/L    Chloride 103 98 - 107 mmol/L    Bicarbonate 21 21 - 32 mmol/L    Anion Gap 17 10 - 20 mmol/L    Urea Nitrogen 28 (H) 6 - 23 mg/dL    Creatinine 1.44 (H) 0.50 - 1.05 mg/dL    eGFR 37 (L) >60 mL/min/1.73m*2    Calcium 9.0 8.6 - 10.3 mg/dL   CBC   Result Value Ref Range    WBC 9.9 4.4 - 11.3 x10*3/uL    nRBC 0.0 0.0 - 0.0 /100 WBCs    RBC 4.44 4.00 - 5.20 x10*6/uL    Hemoglobin 13.0 12.0 - 16.0 " g/dL    Hematocrit 40.2 36.0 - 46.0 %    MCV 91 80 - 100 fL    MCH 29.3 26.0 - 34.0 pg    MCHC 32.3 32.0 - 36.0 g/dL    RDW 13.6 11.5 - 14.5 %    Platelets 221 150 - 450 x10*3/uL     Assessment & Plan    Labs reviewed.  Hemoglobin is stable, no leukocytosis, creatinine subtle downtrend to 1.44 from 1.58. Still a fair amount of flank pain.    Plan: Left hydronephrosis, multiple L renal calculi  - IVF as able  - Hold eliquis if able  - IV Abx  - Trend lab work  - Added for cystoscopy, ureteroscopy, possible stent tomorrow with Dr Solis  - Cardiology consult for OR clearance  - NPO MN, ice chips okay    Discussed with Dr Solis.    I spent 35 minutes in the professional and overall care of this patient.    Elkin Xiong PA-C         [1] [Held by provider] apixaban, 5 mg, oral, q12h HELGA  atorvastatin, 40 mg, oral, Daily  cefTRIAXone, 1 g, intravenous, q24h  cholecalciferol, 50 mcg, oral, Daily  dilTIAZem CD, 180 mg, oral, Daily  DULoxetine, 60 mg, oral, Daily  levothyroxine, 25 mcg, oral, Daily before breakfast  morphine, 4 mg, intravenous, Once  ondansetron, 4 mg, intravenous, Once  pantoprazole, 40 mg, oral, Daily before breakfast   Or  pantoprazole, 40 mg, intravenous, Daily before breakfast  sennosides, 2 tablet, oral, BID  triamcinolone, 1 Application, Topical, BID     [2]    [3] PRN medications: acetaminophen **OR** acetaminophen **OR** acetaminophen, acetaminophen **OR** acetaminophen **OR** acetaminophen, guaiFENesin, melatonin, ondansetron **OR** ondansetron

## 2025-06-22 NOTE — CONSULTS
"  Inpatient consult to Cardiology  Consult performed by: Raynn Davis, APRN-CNP  Consult ordered by: Elkin Xiong PA-C  Reason for consult: cardiac clearance for OR tomorrow      History Of Present Illness:    Millie Riley is a 79 y.o. female with PMHx significant for HTN, HFpEF, Afib on Eliquis with hx DCCV 1/2025, hypothyroidism, lymphedema, asthma, OA  who presented to [facility] 6/20/25 with lower abdominal and left flank pain,  workup revealed left hydronephrosis with multiple left renal calculi, DEON, possible pyelonephritis, Afib w/ RVR. She is planned for cystoscopy, ureteroscopy tomorrow Monday 6/23/25. Cardiology consulted for \"cardiac clearance for OR tomorrow.\"     She denies any c/o cp, sob. No SNYDER. She denies palpitations. She was unaware that she was back in atrial fibrillation. In the past when she presented with Afib she had c/o sob, SNYDER. None at present. At baseline able to go up 1 flight of stairs without stopping. Independent in her ADLs. She has chronic BLE edema, lymphedema for which she takes Torsemide for. She reports her BLE edema is slightly worse as she her home Torsemide and Spironolactone are currently held in setting of DEON.     She had hematuria this past March in the setting of Eliquis use. She was referred for LAAO/Watchman.  She is planned for LAAO/Watchman with Dr. Hermosillo 7/18/25 due to hx significant fall risk, history of mechanical fall, hematuria and previous missed doses of  OAC. She denies any missed doses of OAC in the month prior to admit.         Tele: NOT on telemetry   Cardiologist: Dr. Gillombardo.   Outpatient cardiac medications: Torsemide 40 mg daily, Spironolactone 25 mg daily, Diltiazem 180 mg daily, Atorvastatin 40 mg daily, Eliquis 5 mg BID    Social history: Denies smoking, alcohol abuse, or illicit drug use.         Past Cardiology Tests (Last 3 Years):  EKG:  Results for orders placed during the hospital encounter of 06/20/25    ECG 12 lead " (Preliminary)  This result has not been signed. Information might be incomplete.    Narrative  Atrial fibrillation with rapid ventricular response  Left axis deviation  Abnormal QRS-T angle, consider primary T wave abnormality  Abnormal ECG  When compared with ECG of 20-JAN-2025 11:16,  QRS axis Shifted left      Results for orders placed in visit on 01/20/25    ECG 12 lead (Clinic Performed)          Narrative  Atrial fibrillation with rapid ventricular response  Nonspecific ST and T wave abnormality , probably digitalis effect  Abnormal ECG  When compared with ECG of 02-JAN-2025 09:27,  Atrial fibrillation has replaced Sinus rhythm  QRS axis Shifted right  Criteria for Lateral infarct are no longer Present  Confirmed by Acosta Alcantara ([id number]) on 1/31/2025 10:43:40 AM      Results for orders placed during the hospital encounter of 12/31/24    ECG 12 Lead          Narrative  Suspect arm lead reversal, interpretation assumes no reversal  Sinus rhythm with 1st degree AV block with Premature atrial complexes  Lateral infarct , age undetermined  Abnormal ECG  When compared with ECG of 01-JAN-2025 08:51,  Sinus rhythm has replaced Atrial fibrillation  QRS axis Shifted left  Lateral infarct is now Present  Criteria for Inferior infarct are no longer Present  Confirmed by Simone Shahid ([id number]) on 1/8/2025 7:58:38 AM          Echo:  Results for orders placed during the hospital encounter of 12/31/24    Transesophageal Echo (SANTOS) With Possible Cardioversion    Narrative  [facility], [address] [place] [address]  Tel [phone] and Fax [phone]    TRANSESOPHAGEAL ECHOCARDIOGRAM REPORT      Patient Name:       ALAN Penn Physician:    [id number] Ludwin Calderon MD  Study Date:         1/2/2025            Ordering Provider:    [id number] LATA MEDEROS  MRN/PID:            [id number]            Fellow:  Accession#:         [id number]        Nurse:  Date of Birth/Age:  1945 / 79       Sonographer:          Winifred olmos                                     CS  Gender assigned at  F                   Additional Staff:  Birth:  Height:             165.10 cm           Admit Date:           12/31/2024  Weight:             117.48 kg           Admission Status:     Inpatient -  Routine  BSA / BMI:          2.21 m2 / 43.10     Encounter#:           [id number]  kg/m2  Blood Pressure:     134/77 mmHg         Department Location:    Study Type:    TRANSESOPHAGEAL ECHO (SANTOS) W/ POSSIBLE CARDIOVERSION  Diagnosis/ICD: Unspecified atrial fibrillation-I48.91; Abnormal  electrocardiogram [ECG] [EKG]-R94.31; Nonrheumatic aortic (valve)  stenosis-I35.0  Indication:    Afib, AS, Abn EKG  CPT Code:      SANTOS Complete-58296; Color Doppler-87533; Doppler Limited-19548  Study Detail: The following Echo studies were performed: 2D, Doppler and color  flow. Agitated saline used as a contrast agent for intraseptal  flow evaluation. The patient was sedated.      PHYSICIAN INTERPRETATION:  SANTOS Details: The SANTOS probe used was [id number]. Technically adequate omniplane transesophageal echocardiogram performed. Agitated saline contrast and color flow Doppler echo was performed to assess for the presence of a patent foramen ovale.  SANTOS Medication: The patient was sedated by Anesthesia; please refer to anesthesia flow sheet for medications used. Total intraservice time for moderate sedation was 15 minutes.  SANTOS Procedure: The probe was passed without difficulty. Complications encountered during procedure: Patient tolerated the procedure well without any apparent complications.  Left Ventricle: The left ventricular systolic function is normal, with a visually estimated ejection fraction of 55-60%. The left ventricular cavity size was not assessed. Left ventricular diastolic filling was not assessed.  Left Atrium: The left atrium enlarged. A bubble study using agitated saline was performed. Bubble study is negative. The  left atrial appendage Doppler velocities are normal and there is no thrombus visualized in the left atrial appendage.  Right Ventricle: The right ventricle is normal in size. There is normal right ventricular global systolic function.  Right Atrium: The right atrium enlarged.  Aortic Valve: The aortic valve is trileaflet. There is moderate aortic valve cusp calcification. There is no evidence of aortic valve regurgitation.  Mitral Valve: The mitral valve is mild to moderately thickened. There is mild to moderate mitral annular calcification. The peak instantaneous gradient of the mitral valve is 5 mmHg. There is mild mitral valve regurgitation.  Tricuspid Valve: The tricuspid valve is structurally normal. There is mild tricuspid regurgitation. The Doppler estimated RVSP is slightly elevated at 40.6 mmHg.  Pulmonic Valve: The pulmonic valve is structurally normal. There is physiologic pulmonic valve regurgitation.  Pericardium: Trivial to small pericardial effusion.  Aorta: The aortic root is normal. The aortic root is at the upper limits of normal size. There is plaque visualized in the ascending aorta, which is classified as a Grade 2 [mild (focal or diffuse) intimal thickening of 2-3 mm] atherosclerosis. There is plaque visualized in the descending aorta which is classified as a Grade 2 [mild (focal or diffuse) intimal thickening of 2-3 mm] atherosclerosis.  Pulmonary Veins: The pulmonary veins appear normal and return normally to the left atrium.      CONCLUSIONS:  1. The left ventricular systolic function is normal, with a visually estimated ejection fraction of 55-60%.  2. There is normal right ventricular global systolic function.  3. Slightly elevated right ventricular systolic pressure.  4. There is moderate aortic valve cusp calcification.  5. There is plaque visualized in the ascending aorta.  6. There is plaque visualized in the descending aorta.    QUANTITATIVE DATA SUMMARY:    LV SYSTOLIC FUNCTION BY 2D  PLANIMETRY (MOD):  Normal Ranges:  EF-Visual:      58 %  LV EF Reported: 58 %      MITRAL VALVE:          Normal Ranges:  MV Vmax:      1.13 m/s (<=1.3m/s)  MV peak P.1 mmHg (<5mmHg)  MV mean P.4 mmHg (<2mmHg)  MV VTI:       14.93 cm (10-13cm)      TRICUSPID VALVE/RVSP:          Normal Ranges:  Peak TR Velocity:     3.07 m/s  RV Syst Pressure:     41 mmHg  (< 30mmHg)      AORTA:  Asc Ao Diam 3.87 cm      Zabrina Calderon MD  Electronically signed on 2025 at 10:58:38 AM        ** Final **      Transthoracic Echo (TTE) Complete    Narrative  [facility], [address] [place] [address]  Tel [phone] and Fax [phone]    TRANSTHORACIC ECHOCARDIOGRAM REPORT      Patient Name:       ALAN SINGER   Reading Physician:    Zabrina Calderon MD  Study Date:         2025            Ordering Provider:    [id number] KATY MITCHELL  MRN/PID:            [id number]            Fellow:  Accession#:         [id number]        Nurse:                Danielle Eric  Date of Birth/Age:  1945      Sonographer:          Winifred olmos RDCS  Gender assigned at  F                   Additional Staff:  Birth:  Height:             139.70 cm           Admit Date:           2024  Weight:             117.48 kg           Admission Status:     Inpatient -  Routine  BSA / BMI:          1.96 m2 / 60.20     Encounter#:           [id number]  kg/m2  Blood Pressure:     134/77 mmHg         Department Location:  [facility] Non  Invasive    Study Type:    TRANSTHORACIC ECHO (TTE) COMPLETE  Diagnosis/ICD: Unspecified atrial fibrillation-I48.91; Heart failure,  unspecified-I50.9; Other forms of dyspnea-R06.09  Indication:    Afib, CHF, , Dyspnea on Exertion  CPT Code:      Echo Complete w Full Doppler-08719    Patient History:  Pertinent History: HTN, Hyperlipidemia, A-Fib, Dyspnea and Chronic Lymphedema.    Study Detail: The following Echo  studies were performed: 2D, M-Mode, Doppler and  color flow. Technically challenging study due to body habitus,  patient lying in supine position and prominent lung artifact.  Definity used as a contrast agent for endocardial border  definition. Total contrast used for this procedure was 1.0 mL via  IV push. Unable to obtain suprasternal notch view. The patient was  awake.      PHYSICIAN INTERPRETATION:  Left Ventricle: The left ventricular systolic function is normal, with a visually estimated ejection fraction of 55-60%. The left ventricular cavity size is normal. There is normal septal and normal posterior left ventricular wall thickness. Spectral Doppler shows a Grade II (pseudonormal pattern) of left ventricular diastolic filling.  Left Atrium: The left atrium was not well visualized.  Right Ventricle: The right ventricle was not well visualized. There is normal right ventricular global systolic function.  Right Atrium: The right atrium was not well visualized.  Aortic Valve: The aortic valve is probably trileaflet. There is mild to moderate aortic valve cusp calcification. The aortic valve dimensionless index is 0.83. There is no evidence of aortic valve regurgitation. The peak instantaneous gradient of the aortic valve is 2 mmHg. The mean gradient of the aortic valve is 1 mmHg. Unable to assess the severity of aortic stenosis.  Mitral Valve: The mitral valve is mildly thickened. There is mild mitral annular calcification. There is trace to mild mitral valve regurgitation.  Tricuspid Valve: The tricuspid valve was not well visualized. Tricuspid regurgitation was not assessed.  Pulmonic Valve: The pulmonic valve is not well visualized. There is physiologic pulmonic valve regurgitation.  Pericardium: Trivial pericardial effusion.  Aorta: The aortic root is normal.  Systemic Veins: The inferior vena cava appears normal in size, with IVC inspiratory collapse greater than 50%.  In comparison to the previous  echocardiogram(s): There are no prior studies on this patient for comparison purposes.      CONCLUSIONS:  1. Poorly visualized anatomical structures due to suboptimal image quality.  2. The left ventricular systolic function is normal, with a visually estimated ejection fraction of 55-60%.  3. Spectral Doppler shows a Grade II (pseudonormal pattern) of left ventricular diastolic filling.  4. There is normal right ventricular global systolic function.    QUANTITATIVE DATA SUMMARY:    2D MEASUREMENTS:          Normal Ranges:  IVSd:            0.92 cm  (0.6-1.1cm)  LVPWd:           0.94 cm  (0.6-1.1cm)  LVIDd:           5.23 cm  (3.9-5.9cm)  LVIDs:           2.61 cm  LV Mass Index:   91 g/m2  LVEDV Index:     25 ml/m2  LV % FS          50.2 %      AORTA MEASUREMENTS:         Normal Ranges:  Ao Sinus, d:        2.90 cm (2.1-3.5cm)  Asc Ao, d:          3.60 cm (2.1-3.4cm)      LV SYSTOLIC FUNCTION BY 2D PLANIMETRY (MOD):  Normal Ranges:  EF-A4C View:    56 % (>=55%)  EF-A2C View:    55 %  EF-Biplane:     54 %  EF-Visual:      58 %  LV EF Reported: 58 %      LV DIASTOLIC FUNCTION:            Normal Ranges:  MV Peak E:             0.87 m/s   (0.7-1.2 m/s)  MV Peak A:             0.30 m/s   (0.42-0.7 m/s)  E/A Ratio:             2.85       (1.0-2.2)  MV A Dur:              68.51 msec      MITRAL VALVE:          Normal Ranges:  MV DT:        138 msec (150-240msec)      AORTIC VALVE:                     Normal Ranges:  AoV Vmax:                0.67 m/s (<=1.7m/s)  AoV Peak P.8 mmHg (<20mmHg)  AoV Mean P.0 mmHg (1.7-11.5mmHg)  LVOT Max Kapil:            0.59 m/s (<=1.1m/s)  AoV VTI:                 13.26 cm (18-25cm)  LVOT VTI:                11.06 cm  LVOT Diameter:           1.64 cm  (1.8-2.4cm)  AoV Area, VTI:           1.75 cm2 (2.5-5.5cm2)  AoV Area,Vmax:           1.87 cm2 (2.5-4.5cm2)  AoV Dimensionless Index: 0.83      RIGHT VENTRICLE:  RV s' 0.08 m/s      TRICUSPID VALVE/RVSP:           Normal Ranges:  Peak TR Velocity:     1.93 m/s  RV Syst Pressure:     23 mmHg  (< 30mmHg)  IVC Diam:             1.80 cm      PULMONIC VALVE:          Normal Ranges:  PV Max Kapil:     0.6 m/s  (0.6-0.9m/s)  PV Max P.5 mmHg      AORTA:  Asc Ao Diam 3.63 cm      [id number] Ludwin Calderon MD  Electronically signed on 2025 at 10:50:16 AM        ** Final (Updated) **    Ejection Fractions:  LV EF   Date/Time Value Ref Range Status   2025 10:03 AM 58 %    2025 09:26 AM 58 %      Cath:  No results found for this or any previous visit.    Stress Test:  Results for orders placed in visit on 22    Nuclear Stress Test    Narrative  MRN: [id number]  Patient Name: ALAN SINGER    STUDY:  CARDIAC STRESS/REST INJECTION; PART 2 STRESS OR REST (NO CHARGE);  CARDIAC STRESS/REST (MYOCARDIAL PERFUSION/MIBI);  2022 10:54 am    INDICATION:  shortness of breath with any exertion  R60.9: Edema R07.89: Atypical  chest pain R06.02: SOB (shortness of breath) on exertion.    COMPARISON:  None.    ACCESSION NUMBER(S):  [id number]; [id number]; [id number]    ORDERING CLINICIAN:  EZEKIEL CAMPO    TECHNIQUE:  DIVISION OF NUCLEAR MEDICINE  PHARMACOLOGIC STRESS MYOCARDIAL PERFUSION SCAN, ONE DAY PROTOCOL    The patient received an intravenous dose of  11.2 mCi of Tc-99m  tetrofosmin and resting emission tomographic (SPECT) images of the  myocardium were acquired. The patient then received an intravenous  infusion of 0.4mg regadenoson (Lexiscan) followed by an additional  dose of  34.5 mCi of Tc-99m tetrofosmin. Stress phase SPECT images of  the myocardium were then acquired. These included ECG-gated images to  assess and quantify ventricular function.    FINDINGS:  Stress images demonstrate a normal distribution of perfusion  throughout all LV segments with no sign of ischemia.    ECG-gated images demonstrate normal LV size and myocardial  contractility with an LV ejection fraction of   75 % (normal above  50  percent).    Impression  1. Normal stress myocardial perfusion imaging in response to  pharmacologic stress.  2. Calculated ejection fraction is 75% without segmental wall motion  abnormality seen.    Cardiac Imaging:  No results found for this or any previous visit.      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    Past Surgical History:  She has a past surgical history that includes Total knee arthroplasty (06/27/2013) and US guided thyroid biopsy (3/10/2014).      Social History:  She reports that she has never smoked. She has never used smokeless tobacco. She reports current alcohol use. She reports that she does not use drugs.    Family History:  Family History[1]     Allergies:  Patient has no known allergies.    ROS:  10 point review of systems including (Constitutional, Eyes, ENMT, Respiratory, Cardiac, Gastrointestinal, Neurological, Psychiatric, and Hematologic) was performed and is otherwise negative.    Objective Data:  Last Recorded Vitals:  Vitals:    06/21/25 1115 06/21/25 1623 06/21/25 2001 06/22/25 0822   BP: 127/82 143/83 132/79 140/79   BP Location: Right arm Right arm Right arm Right arm   Patient Position: Lying Lying Lying Sitting   Pulse: 102 101 (!) 119 110   Resp: 17 16 16 16   Temp: 36.4 °C (97.6 °F) 36.5 °C  "(97.7 °F) 36.8 °C (98.3 °F) 37.1 °C (98.8 °F)   TempSrc: Temporal Temporal Temporal Temporal   SpO2:  94% 98% 96%   Weight:       Height:         Medical Gas Therapy: None (Room air)  Weight  Av kg (250 lb)  Min: 113 kg (250 lb)  Max: 113 kg (250 lb)      LABS:  CMP:  Results from last 7 days   Lab Units 25  0848 25  0548 25  1642   SODIUM mmol/L 136 138 136   POTASSIUM mmol/L 4.6 4.4 4.8   CHLORIDE mmol/L 103 104 101   CO2 mmol/L 21 22 27   ANION GAP mmol/L 17 16 13   BUN mg/dL 28* 31* 33*   CREATININE mg/dL 1.44* 1.58* 1.41*   EGFR mL/min/1.73m*2 37* 33* 38*   ALBUMIN g/dL  --   --  4.2   ALT U/L  --   --  29   AST U/L  --   --  23   BILIRUBIN TOTAL mg/dL  --   --  0.6     CBC:  Results from last 7 days   Lab Units 25  0848 25  0548 25  1642   WBC AUTO x10*3/uL 9.9 8.0 9.3   HEMOGLOBIN g/dL 13.0 13.3 14.4   HEMATOCRIT % 40.2 40.6 45.5   PLATELETS AUTO x10*3/uL 221 236 265   MCV fL 91 91 94     COAG:   Results from last 7 days   Lab Units 25  1642   INR  1.3*     ABO:   ABO TYPE   Date Value Ref Range Status   2025 O  Final     HEME/ENDO:  Results from last 7 days   Lab Units 25  1642   IRON SATURATION % 14*      CARDIAC:       No lab exists for component: \"CK\", \"CKMBP\"          Last I/O:    Intake/Output Summary (Last 24 hours) at 2025 1229  Last data filed at 2025 2137  Gross per 24 hour   Intake 1031.67 ml   Output --   Net 1031.67 ml     Net IO Since Admission: 2,080.67 mL [25 1229]      Imaging Results:  CT angio abdomen pelvis w and or wo IV IV contrast  Result Date: 2025  Interpreted By:  Albaro Norman, STUDY: CT ANGIO ABDOMEN PELVIS W AND/OR WO IV IV CONTRAST;  2025 7:19 pm   INDICATION: Signs/Symptoms:diffuse abd pain black stool.     COMPARISON: 2023   ACCESSION NUMBER(S): [id number]   ORDERING CLINICIAN: DARRICK GARCIA   TECHNIQUE: Contiguous non-contrast axial images of the abdomen and pelvis were initially " obtained.   Thin-section axial images of the abdomen and pelvis were obtained in the arterial and portal venous phase after intravenous administration of iodinated contrast. Sagittal and coronal reformatted images were provided. MIP images and 3D reconstructions were created on an independent workstation and reviewed.   FINDINGS: VASCULATURE:   ABDOMINAL AORTA: No abdominal aortic aneurysm or dissection. No significant abdominal aortic atherosclerosis.   ABDOMINAL and PELVIC ARTERIES:  No hemodynamically significant stenosis or occlusion.   VENOUS: No significant abnormality.     CT ABDOMEN/PELVIS:   LOWER CHEST: Mild aortic valvular calcification. Mild cardiomegaly with biatrial dilatation. Mild bibasilar atelectasis without pleural effusions.   ABDOMINAL WALL: No significant abnormality.   LIVER: There are multiple too-small-to-characterize hypodensities in the liver statistically representing benign cysts. There also simple cysts measuring up to 1.8 cm. No suspicious lesions.   BILE DUCTS: No significant intrahepatic or extrahepatic dilatation.   GALLBLADDER: No significant abnormality.   PANCREAS: No significant abnormality.   SPLEEN: No significant abnormality.   ADRENALS: No significant abnormality.   KIDNEYS, URETERS, BLADDER: There is a 0.6 cm nonobstructing right renal calculus. There has been interval appearance of numerous new left renal calculi and enlargement of the pre-existing renal pelvic calculus. For example, a 2.1 cm x 1.1 cm renal pelvic calculus previously measured 1.6 cm x 0.9 cm. There is a new 1 cm x 1.7 cm renal pelvis calculus MX innumerable small calculi lying in the pelvis and within the calices. There is moderate left hydronephrosis, increased from prior study, as well as a delayed nephrogram. However, dominant renal pelvic calculus does not appear to be resulting in direct obstruction at the ureteral pelvic junction. There is interval worsening of left perinephric fat stranding. 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   REPRODUCTIVE ORGANS: No significant abnormality.   RETROPERITONEUM/LYMPH NODES: No acute retroperitoneal abnormality. No enlarged lymph nodes.   BOWEL/PERITONEUM: There is small bowel and colonic diverticulosis without evidence for diverticulitis.  No evidence of active gastrointestinal hemorrhage. No inflammatory bowel wall thickening or dilatation. Normal appendix.   No ascites, free air, or fluid collection.     MUSCULOSKELETAL: No acute osseous abnormality. Healed fractures of right L2, L3, L4 transverse processes. Multilevel degenerative changes of the thoracic and lumbar spine without high-grade canal stenosis. No suspicious osseous lesion.       1. No evidence of active gastrointestinal hemorrhage.   2. Worsening left hydronephrosis and perinephric inflammatory fat stranding. There is appearance of numerous new nonobstructing left renal calculi and interval enlargement of a dominant renal pelvic calculus that extensive the UPJ and could be resulting in intermittent obstruction given delayed nephrogram. However, delayed nephrogram could also be result of globally poor renal function secondary to the aforementioned on a chronic basis. No areas of focal parenchymal hypoenhancement to suggest acute pyelonephritis though correlation with clinical factors intraorally.   3. Nonobstructing subcentimeter right renal calculus.   4. Diverticulosis of the small bowel and colon without evidence of acute diverticulitis.   MACRO: None.   Signed by: Albaro Norman 6/20/2025 7:44 PM Dictation workstation:   [id number]    ECG 12 lead  Result Date: 6/20/2025  Atrial fibrillation with rapid ventricular response Left axis deviation Abnormal QRS-T angle, consider primary T wave abnormality Abnormal ECG When compared with ECG of 20-JAN-2025 11:16, QRS axis Shifted left      Inpatient Medications:  Scheduled  "Medications[2]  PRN Medications[3]  Continuous Medications[4]    Outpatient Medications:  Current Outpatient Medications   Medication Instructions    apixaban (ELIQUIS) 5 mg, oral, Every 12 hours    ascorbic acid, vitamin C, 500 mg capsule 1 capsule, Daily RT    atorvastatin (LIPITOR) 40 mg, oral, Daily    cholecalciferol (VITAMIN D-3) 2,000 Units, Daily RT    dilTIAZem CD (CARDIZEM CD) 180 mg, oral, Daily    DULoxetine (CYMBALTA) 60 mg, oral, Daily    levothyroxine (SYNTHROID, LEVOXYL) 25 mcg, oral, Daily    multivitamin (Daily Multi-Vitamin) tablet 1 tablet, Daily    spironolactone (ALDACTONE) 25 mg, oral, Daily    torsemide 40 mg, oral, Daily    triamcinolone (Kenalog) 0.1 % cream Topical, 2 times daily, Apply to affected area 1-2 times daily as needed.       Physical Exam:  General:  Patient is awake, alert, and oriented.  Patient is in no acute distress.  HEENT:  Pupils equal and reactive.  Normocephalic.  Moist mucosa.    Neck:  Difficulty assessing JVD 2/2 body habitus.   Cardiovascular:  IRR. +Murmur.   Pulmonary:  Clear to auscultation bilaterally.   Abdomen:  Soft. Non-tender.   Non-distended.  Positive bowel sounds.  Lower Extremities:  2+ pedal pulses. BLE edema 2+, lymphedema   Neurologic:  Cranial nerves intact.  No focal deficit.   Skin: Skin warm and dry, normal skin turgor.   Psychiatric: Normal affect.     Assessment/Plan   Millie Riley is a 79 y.o. female with PMHx significant for HTN, HFpEF, Afib on Eliquis with hx DCCV 1/2025, hypothyroidism, lymphedema, asthma, OA  who presented to [facility] 6/20/25 with lower abdominal and left flank pain,  workup revealed left hydronephrosis with multiple left renal calculi, DEON, possible pyelonephritis, Afib w/ RVR. She is planned for cystoscopy, ureteroscopy tomorrow Monday 6/23/25. Cardiology consulted for \"cardiac clearance for OR tomorrow.\"       Cardiologist: Dr. Gillombardo.   Outpatient cardiac medications: Torsemide 40 mg daily, Spironolactone 25 " mg daily, Diltiazem 180 mg daily, Atorvastatin 40 mg daily, Eliquis 5 mg BID      #HFpEF, chronic   -Euvolemic on exam.   - Bumex, Spironolactone held per primary in setting of DEON.   -TTE 1/2025: LVEF 55-60%, pseudonormal diastolic dysfunction       #HTN, fair control on current regimen. Bumex, Spironolactone held per primary in setting of DEON.     #Paroxysmal Afib  -Recurrence of Afib/RVR this admit in the setting of possible pyelonephritis, left hydronephrosis with multiple left renal calculi, DEON  -IRR on physical exam, will confirm with ECG today. Not currently on tele, ordered.   -HR uncontrolled, -120s.   -Plan for rate control at this time; will plan to increase home Diltiazem to 180 mg BID. Hold on rhythm control as OAC currently held in anticipation for cystoscopy, ureteroscopy tomorrow Monday 6/23/25. Last dose early AM 6/20/25)  -Resume Eliquis post cystoscopy, ureteroscopy per Urology  -Planned for LAAO/Watchman 7/18/25 with Dr. Hermosillo in the setting of previous hematuria & desire to get off OAC.     [id number] Stroke Risk Points: 5   Values used to calculate this score:    Points  Metrics       1        Has Congestive Heart Failure: Yes       1        Has Hypertension: Yes       2        Age: 79       0        Has Diabetes: No       0        Had Stroke: No                 Had TIA: No                 Had Thromboembolism: No       0        Has Vascular Disease: No       1        Clinically Relevant Sex: Female            Plan/Recs:   -METS>4. RCRI 1, patient is at low to intermediate risk for a moderate risk procedure. No further cardiac risk stratification is needed.   -Afib rates remain uncontrolled. Will increase Diltiazem to 180 mg BID.   -Resume OAC post procedure per Urology.   -If she remains in Afib post procedure, would plan for SANTOS +/- DCCV ~earliest Tuesday if patient agreeable  -Follow up with Dr. Gillombardo in the next 4-6 weeks as outpatient.   -Outpatient LAAO/Watchman 7/18/25 as  "scheduled with Dr. Hermoisllo in the setting of previous hematuria & desire to get off OAC.       Code Status:  Full Code  Ryann Davis, APRN-CNP        ======================================  Attending note   ======================================  Both the JULISA and I have had a face to face encounter with the patient today. I have examined the patient and edited the documented physical examination as necessary.  I personally reviewed the patient's recent labs, medications, orders, EKGs, and pertinent cardiac imaging.  I have reviewed the JULISA's encounter note, approve the JULISA's documentation and have edited the note to reflect the diagnostic and therapeutic plan.    Millie Riley is a 79 y.o. female with PMHx significant for HTN, HFpEF, Afib on Eliquis with hx DCCV 1/2025, hypothyroidism, lymphedema, asthma, OA  who presented to [facility] 6/20/25 with lower abdominal and left flank pain,  workup revealed left hydronephrosis with multiple left renal calculi, DEON, possible pyelonephritis, Afib w/ RVR. She is planned for cystoscopy, ureteroscopy tomorrow Monday 6/23/25. Cardiology consulted for \"cardiac clearance for OR tomorrow.\"      She denies any c/o cp, sob. No SNYDER. She denies palpitations. She was unaware that she was back in atrial fibrillation. In the past when she presented with Afib she had c/o sob, SNYDER. None at present. At baseline able to go up 1 flight of stairs without stopping. Independent in her ADLs. She has chronic BLE edema, lymphedema for which she takes Torsemide for. She reports her BLE edema is slightly worse as she her home Torsemide and Spironolactone are currently held in setting of DEON.      She had hematuria this past March in the setting of Eliquis use. She was referred for LAAO/Watchman.  She is planned for LAAO/Watchman with Dr. Hermosillo 7/18/25 due to hx significant fall risk, history of mechanical fall, hematuria and previous missed doses of  OAC. She denies any missed doses of OAC in " the month prior to admit.            Tele: NOT on telemetry   Cardiologist: Dr. Gillombardo.   Outpatient cardiac medications: Torsemide 40 mg daily, Spironolactone 25 mg daily, Diltiazem 180 mg daily, Atorvastatin 40 mg daily, Eliquis 5 mg BID     Social history: Denies smoking, alcohol abuse, or illicit drug use.        No exacerbating or relieving factors.  Patient denies chest pain and angina.  Pt denies shortness of breath, dyspnea on exertion, orthopnea, and paroxysmal nocturnal dyspnea.  Pt denies worsening lower extremity edema.  Pt denies palpitations or syncope.  No recent falls.  No fever or chills.  No cough.  No change in bowel or bladder habits.  No sick contacts.  No recent travel.     12 point review of systems including (Constitutional, Eyes, ENMT, Respiratory, Cardiac, Gastrointestinal, Neurological, Psychiatric, and Hematologic) was performed and is otherwise negative.    Past medical history:  As above.    Medications were reviewed.    Allergies were reviewed.    Social history:  Patient denies smoking, alcohol abuse, or illicit drug use.    Family history:  No sudden cardiac death or premature coronary artery disease.     General:  Patient is awake, alert, and oriented.  Patient is in no acute distress.  HEENT:  Pupils equal and reactive.  Normocephalic.  Moist mucosa.    Neck:  No thyromegaly.  Normal Jugular Venous Pressure.  Cardiovascular:  Regular rate and rhythm.  Normal S1 and S2.  Pulmonary:  Clear to auscultation bilaterally.  Abdomen:  Soft. Non-tender.   Non-distended.  Positive bowel sounds.  Lower Extremities:  2+ pedal pulses. No LE edema.  Neurologic:  Cranial nerves intact.  No focal deficit.   Skin: Skin warm and dry, normal skin turgor.   Psychiatric: Normal affect.    Vital signs, telemetry, medications, labs, and imaging were reviewed as well.    Millie Riley is a 79 y.o. female with PMHx significant for HTN, HFpEF, Afib on Eliquis with hx DCCV 1/2025, hypothyroidism,  "lymphedema, asthma, OA  who presented to [facility] 6/20/25 with lower abdominal and left flank pain,  workup revealed left hydronephrosis with multiple left renal calculi, DEON, possible pyelonephritis, Afib w/ RVR. She is planned for cystoscopy, ureteroscopy tomorrow Monday 6/23/25. Cardiology consulted for \"cardiac clearance for OR tomorrow.\"       Cardiologist: Dr. Gillombardo.   Outpatient cardiac medications: Torsemide 40 mg daily, Spironolactone 25 mg daily, Diltiazem 180 mg daily, Atorvastatin 40 mg daily, Eliquis 5 mg BID      #HFpEF, chronic   -Euvolemic on exam.   - Bumex, Spironolactone held per primary in setting of DEON.   -TTE 1/2025: LVEF 55-60%, pseudonormal diastolic dysfunction       #HTN, fair control on current regimen. Bumex, Spironolactone held per primary in setting of DEON.     #Paroxysmal Afib  -Recurrence of Afib/RVR this admit in the setting of possible pyelonephritis, left hydronephrosis with multiple left renal calculi, DEON  -IRR on physical exam, will confirm with ECG today. Not currently on tele, ordered.   -HR uncontrolled, -120s.   -Plan for rate control at this time; will plan to increase home Diltiazem to 180 mg BID. Hold on rhythm control as OAC currently held in anticipation for cystoscopy, ureteroscopy tomorrow Monday 6/23/25. Last dose early AM 6/20/25)  -Resume Eliquis post cystoscopy, ureteroscopy per Urology  -Planned for LAAO/Watchman 7/18/25 with Dr. Hermosillo in the setting of previous hematuria & desire to get off OAC.     [id number] Stroke Risk Points: 5   Values used to calculate this score:    Points  Metrics       1        Has Congestive Heart Failure: Yes       1        Has Hypertension: Yes       2        Age: 79       0        Has Diabetes: No       0        Had Stroke: No                 Had TIA: No                 Had Thromboembolism: No       0        Has Vascular Disease: No       1        Clinically Relevant Sex: Female            Plan/Recs:   -METS>4. RCRI " 1, patient is at low to intermediate risk for a moderate risk procedure. No further cardiac risk stratification is needed.   -Afib rates remain uncontrolled. Will increase Diltiazem to 180 mg BID.   -Resume OAC post procedure per Urology.   -If she remains in Afib post procedure, would plan for SANTOS +/- DCCV ~earliest Tuesday if patient agreeable  -Follow up with Dr. Gillombardo in the next 4-6 weeks as outpatient.   -Outpatient LAAO/Watchman 7/18/25 as scheduled with Dr. Hermosillo in the setting of previous hematuria & desire to get off OAC.           Agustin Kramer DO   Division of Cardiovascular Medicine  [facility]             [1] No family history on file.  [2]   Scheduled medications   Medication Dose Route Frequency    [Held by provider] apixaban  5 mg oral q12h HELGA    atorvastatin  40 mg oral Daily    cefTRIAXone  1 g intravenous q24h    cholecalciferol  50 mcg oral Daily    dilTIAZem CD  180 mg oral Daily    DULoxetine  60 mg oral Daily    levothyroxine  25 mcg oral Daily before breakfast    morphine  4 mg intravenous Once    ondansetron  4 mg intravenous Once    pantoprazole  40 mg oral Daily before breakfast    Or    pantoprazole  40 mg intravenous Daily before breakfast    sennosides  2 tablet oral BID    triamcinolone  1 Application Topical BID   [3]   PRN medications   Medication    acetaminophen    Or    acetaminophen    Or    acetaminophen    acetaminophen    Or    acetaminophen    Or    acetaminophen    guaiFENesin    melatonin    ondansetron    Or    ondansetron   [4]   Continuous Medications   Medication Dose Last Rate

## 2025-06-22 NOTE — CARE PLAN
The patient's goals for the shift include      The clinical goals for the shift include Remain hemodynamically stable  Problem: Pain  Goal: Takes deep breaths with improved pain control throughout the shift  Outcome: Progressing     Problem: Pain  Goal: Performs ADL's with improved pain control throughout shift  Outcome: Progressing     Problem: Pain - Adult  Goal: Verbalizes/displays adequate comfort level or baseline comfort level  Outcome: Progressing     Problem: Safety - Adult  Goal: Free from fall injury  Outcome: Progressing     Problem: Chronic Conditions and Co-morbidities  Goal: Patient's chronic conditions and co-morbidity symptoms are monitored and maintained or improved  Outcome: Progressing

## 2025-06-22 NOTE — CARE PLAN
The patient's goals for the shift include      The clinical goals for the shift include Remain hemodynamically stable      Problem: Pain - Adult  Goal: Verbalizes/displays adequate comfort level or baseline comfort level  Outcome: Progressing     Problem: Safety - Adult  Goal: Free from fall injury  Outcome: Progressing

## 2025-06-23 ENCOUNTER — DOCUMENTATION (OUTPATIENT)
Dept: UROLOGY | Facility: HOSPITAL | Age: 80
End: 2025-06-23
Payer: MEDICARE

## 2025-06-23 ENCOUNTER — ANESTHESIA EVENT (OUTPATIENT)
Dept: OPERATING ROOM | Facility: HOSPITAL | Age: 80
DRG: 660 | End: 2025-06-23
Payer: MEDICARE

## 2025-06-23 ENCOUNTER — APPOINTMENT (OUTPATIENT)
Dept: RADIOLOGY | Facility: HOSPITAL | Age: 80
DRG: 660 | End: 2025-06-23
Payer: MEDICARE

## 2025-06-23 LAB
ANION GAP SERPL CALC-SCNC: 17 MMOL/L (ref 10–20)
APPEARANCE UR: CLEAR
BILIRUB UR STRIP.AUTO-MCNC: NEGATIVE MG/DL
BUN SERPL-MCNC: 28 MG/DL (ref 6–23)
CALCIUM SERPL-MCNC: 8.7 MG/DL (ref 8.6–10.3)
CHLORIDE SERPL-SCNC: 102 MMOL/L (ref 98–107)
CO2 SERPL-SCNC: 20 MMOL/L (ref 21–32)
COLOR UR: ABNORMAL
CREAT SERPL-MCNC: 1.45 MG/DL (ref 0.5–1.05)
EGFRCR SERPLBLD CKD-EPI 2021: 37 ML/MIN/1.73M*2
GLUCOSE BLD MANUAL STRIP-MCNC: 104 MG/DL (ref 74–99)
GLUCOSE BLD MANUAL STRIP-MCNC: 97 MG/DL (ref 74–99)
GLUCOSE SERPL-MCNC: 99 MG/DL (ref 74–99)
GLUCOSE UR STRIP.AUTO-MCNC: NORMAL MG/DL
HOLD SPECIMEN: NORMAL
KETONES UR STRIP.AUTO-MCNC: NEGATIVE MG/DL
LEUKOCYTE ESTERASE UR QL STRIP.AUTO: ABNORMAL
MUCOUS THREADS #/AREA URNS AUTO: ABNORMAL /LPF
NITRITE UR QL STRIP.AUTO: NEGATIVE
PH UR STRIP.AUTO: 5.5 [PH]
POTASSIUM SERPL-SCNC: 4.7 MMOL/L (ref 3.5–5.3)
PROT UR STRIP.AUTO-MCNC: NEGATIVE MG/DL
RBC # UR STRIP.AUTO: ABNORMAL MG/DL
RBC #/AREA URNS AUTO: ABNORMAL /HPF
SODIUM SERPL-SCNC: 134 MMOL/L (ref 136–145)
SP GR UR STRIP.AUTO: 1.01
TSH SERPL-ACNC: 2.08 MIU/L (ref 0.44–3.98)
UROBILINOGEN UR STRIP.AUTO-MCNC: NORMAL MG/DL
WBC #/AREA URNS AUTO: ABNORMAL /HPF

## 2025-06-23 PROCEDURE — 82374 ASSAY BLOOD CARBON DIOXIDE: CPT

## 2025-06-23 PROCEDURE — 2500000004 HC RX 250 GENERAL PHARMACY W/ HCPCS (ALT 636 FOR OP/ED): Performed by: FAMILY MEDICINE

## 2025-06-23 PROCEDURE — 99232 SBSQ HOSP IP/OBS MODERATE 35: CPT | Performed by: INTERNAL MEDICINE

## 2025-06-23 PROCEDURE — 82947 ASSAY GLUCOSE BLOOD QUANT: CPT

## 2025-06-23 PROCEDURE — 81001 URINALYSIS AUTO W/SCOPE: CPT | Performed by: NURSE PRACTITIONER

## 2025-06-23 PROCEDURE — 99232 SBSQ HOSP IP/OBS MODERATE 35: CPT | Performed by: NURSE PRACTITIONER

## 2025-06-23 PROCEDURE — 99233 SBSQ HOSP IP/OBS HIGH 50: CPT | Performed by: INTERNAL MEDICINE

## 2025-06-23 PROCEDURE — 87086 URINE CULTURE/COLONY COUNT: CPT | Mod: AHULAB | Performed by: NURSE PRACTITIONER

## 2025-06-23 PROCEDURE — 36415 COLL VENOUS BLD VENIPUNCTURE: CPT

## 2025-06-23 PROCEDURE — 2500000002 HC RX 250 W HCPCS SELF ADMINISTERED DRUGS (ALT 637 FOR MEDICARE OP, ALT 636 FOR OP/ED): Performed by: NURSE PRACTITIONER

## 2025-06-23 PROCEDURE — 1200000002 HC GENERAL ROOM WITH TELEMETRY DAILY

## 2025-06-23 PROCEDURE — 36415 COLL VENOUS BLD VENIPUNCTURE: CPT | Performed by: NURSE PRACTITIONER

## 2025-06-23 PROCEDURE — 2500000001 HC RX 250 WO HCPCS SELF ADMINISTERED DRUGS (ALT 637 FOR MEDICARE OP): Performed by: NURSE PRACTITIONER

## 2025-06-23 PROCEDURE — 2500000001 HC RX 250 WO HCPCS SELF ADMINISTERED DRUGS (ALT 637 FOR MEDICARE OP): Performed by: FAMILY MEDICINE

## 2025-06-23 PROCEDURE — 84443 ASSAY THYROID STIM HORMONE: CPT | Performed by: NURSE PRACTITIONER

## 2025-06-23 RX ORDER — TAMSULOSIN HYDROCHLORIDE 0.4 MG/1
0.4 CAPSULE ORAL DAILY
Status: DISCONTINUED | OUTPATIENT
Start: 2025-06-23 | End: 2025-06-27 | Stop reason: HOSPADM

## 2025-06-23 RX ADMIN — Medication 50 MCG: at 08:43

## 2025-06-23 RX ADMIN — ATORVASTATIN CALCIUM 40 MG: 40 TABLET, FILM COATED ORAL at 08:43

## 2025-06-23 RX ADMIN — SENNOSIDES 17.2 MG: 8.6 TABLET, FILM COATED ORAL at 20:47

## 2025-06-23 RX ADMIN — TRIAMCINOLONE ACETONIDE 1 APPLICATION: 1 OINTMENT TOPICAL at 21:18

## 2025-06-23 RX ADMIN — CEFTRIAXONE 1 G: 1 INJECTION, SOLUTION INTRAVENOUS at 20:47

## 2025-06-23 RX ADMIN — TAMSULOSIN HYDROCHLORIDE 0.4 MG: 0.4 CAPSULE ORAL at 17:42

## 2025-06-23 RX ADMIN — DILTIAZEM HYDROCHLORIDE 180 MG: 180 CAPSULE, COATED, EXTENDED RELEASE ORAL at 08:43

## 2025-06-23 RX ADMIN — DULOXETINE 60 MG: 30 CAPSULE, DELAYED RELEASE ORAL at 08:43

## 2025-06-23 RX ADMIN — PANTOPRAZOLE SODIUM 40 MG: 40 INJECTION, POWDER, FOR SOLUTION INTRAVENOUS at 06:06

## 2025-06-23 RX ADMIN — DILTIAZEM HYDROCHLORIDE 180 MG: 180 CAPSULE, COATED, EXTENDED RELEASE ORAL at 20:47

## 2025-06-23 RX ADMIN — TRIAMCINOLONE ACETONIDE 1 APPLICATION: 1 OINTMENT TOPICAL at 08:46

## 2025-06-23 ASSESSMENT — PAIN SCALES - GENERAL
PAINLEVEL_OUTOF10: 0 - NO PAIN

## 2025-06-23 ASSESSMENT — COGNITIVE AND FUNCTIONAL STATUS - GENERAL: MOBILITY SCORE: 24

## 2025-06-23 ASSESSMENT — PAIN - FUNCTIONAL ASSESSMENT
PAIN_FUNCTIONAL_ASSESSMENT: 0-10
PAIN_FUNCTIONAL_ASSESSMENT: 0-10

## 2025-06-23 ASSESSMENT — ACTIVITIES OF DAILY LIVING (ADL): LACK_OF_TRANSPORTATION: NO

## 2025-06-23 NOTE — CARE PLAN
The patient's goals for the shift include      The clinical goals for the shift include To remain free from fall/injury

## 2025-06-23 NOTE — PROGRESS NOTES
06/23/25 1234   Discharge Planning   Living Arrangements Alone   Support Systems Children   Assistance Needed Met with patient at bedside, explained role of TCC, patient admitted for abd pain, DEON, kidney stone, also in afib, patient states she is going to the OR today to have cystoscopy, she is hoping for dc after the procedure but she said they also talked about her possibly needing cardioversion with cardiology, she states she is scheduled for a watchman procedure on July 18th but was told this may be postponed d/t current hospitalization, she lives alone, drives at baseline, uses no DME, no supplemental o2, daughter in law or son will provider her transport home at discharge. Dr Michael is her pcp, uses the [facility] in [place] closer to [facility]. plans to dc home with no additional needs.   Type of Residence Private residence   Home or Post Acute Services None   Expected Discharge Disposition Home   Does the patient need discharge transport arranged? No   Financial Resource Strain   How hard is it for you to pay for the very basics like food, housing, medical care, and heating? Not hard   Housing Stability   In the last 12 months, was there a time when you were not able to pay the mortgage or rent on time? N   In the past 12 months, how many times have you moved where you were living? 0   At any time in the past 12 months, were you homeless or living in a shelter (including now)? N   Transportation Needs   In the past 12 months, has lack of transportation kept you from medical appointments or from getting medications? no   In the past 12 months, has lack of transportation kept you from meetings, work, or from getting things needed for daily living? No

## 2025-06-23 NOTE — PROGRESS NOTES
"Subjective Data:  cystoscopy, ureteroscopy, possible stent today   Afib per monitor rates better 80s to 90s     Objective Data:  Last Recorded Vitals:  Vitals:    25 1528 25 2125 25 0613 25 0712   BP: 124/68 110/65 122/71 128/68   BP Location: Right arm Right arm Right arm Right arm   Patient Position: Sitting Sitting Sitting Sitting   Pulse: (!) 115 91 85 85   Resp:  18 18 18   Temp: 36.1 °C (97 °F) 36.4 °C (97.5 °F) 36.3 °C (97.3 °F) 36.6 °C (97.8 °F)   TempSrc: Temporal Temporal Temporal Temporal   SpO2: 96% 95% 95% 96%   Weight:       Height:         Medical Gas Therapy: None (Room air)  Weight  Av kg (250 lb)  Min: 113 kg (250 lb)  Max: 113 kg (250 lb)    LABS:  CMP:  Results from last 7 days   Lab Units 25  0634 25  0848 25  0548 25  1642   SODIUM mmol/L 134* 136 138 136   POTASSIUM mmol/L 4.7 4.6 4.4 4.8   CHLORIDE mmol/L 102 103 104 101   CO2 mmol/L 20* 21 22 27   ANION GAP mmol/L 17 17 16 13   BUN mg/dL 28* 28* 31* 33*   CREATININE mg/dL 1.45* 1.44* 1.58* 1.41*   EGFR mL/min/1.73m*2 37* 37* 33* 38*   ALBUMIN g/dL  --   --   --  4.2   ALT U/L  --   --   --  29   AST U/L  --   --   --  23   BILIRUBIN TOTAL mg/dL  --   --   --  0.6     CBC:  Results from last 7 days   Lab Units 25  0848 25  0548 25  1642   WBC AUTO x10*3/uL 9.9 8.0 9.3   HEMOGLOBIN g/dL 13.0 13.3 14.4   HEMATOCRIT % 40.2 40.6 45.5   PLATELETS AUTO x10*3/uL 221 236 265   MCV fL 91 91 94     COAG:   Results from last 7 days   Lab Units 25  1642   INR  1.3*     ABO:   ABO TYPE   Date Value Ref Range Status   2025 O  Final     HEME/ENDO:  Results from last 7 days   Lab Units 25  1642   IRON SATURATION % 14*      CARDIAC:       No lab exists for component: \"CK\", \"CKMBP\"          Last I/O:    Intake/Output Summary (Last 24 hours) at 2025 1142  Last data filed at 2025 0945  Gross per 24 hour   Intake 50 ml   Output 200 ml   Net -150 ml     Net IO Since " Admission: 1,930.67 mL [06/23/25 1142]      Imaging Results:  CT angio abdomen pelvis w and or wo IV IV contrast  Result Date: 6/20/2025  Interpreted By:  Albaro Norman, STUDY: CT ANGIO ABDOMEN PELVIS W AND/OR WO IV IV CONTRAST;  6/20/2025 7:19 pm   INDICATION: Signs/Symptoms:diffuse abd pain black stool.     COMPARISON: 06/06/2023   ACCESSION NUMBER(S): [id number]   ORDERING CLINICIAN: DARRICK GARCIA   TECHNIQUE: Contiguous non-contrast axial images of the abdomen and pelvis were initially obtained.   Thin-section axial images of the abdomen and pelvis were obtained in the arterial and portal venous phase after intravenous administration of iodinated contrast. Sagittal and coronal reformatted images were provided. MIP images and 3D reconstructions were created on an independent workstation and reviewed.   FINDINGS: VASCULATURE:   ABDOMINAL AORTA: No abdominal aortic aneurysm or dissection. No significant abdominal aortic atherosclerosis.   ABDOMINAL and PELVIC ARTERIES:  No hemodynamically significant stenosis or occlusion.   VENOUS: No significant abnormality.     CT ABDOMEN/PELVIS:   LOWER CHEST: Mild aortic valvular calcification. Mild cardiomegaly with biatrial dilatation. Mild bibasilar atelectasis without pleural effusions.   ABDOMINAL WALL: No significant abnormality.   LIVER: There are multiple too-small-to-characterize hypodensities in the liver statistically representing benign cysts. There also simple cysts measuring up to 1.8 cm. No suspicious lesions.   BILE DUCTS: No significant intrahepatic or extrahepatic dilatation.   GALLBLADDER: No significant abnormality.   PANCREAS: No significant abnormality.   SPLEEN: No significant abnormality.   ADRENALS: No significant abnormality.   KIDNEYS, URETERS, BLADDER: There is a 0.6 cm nonobstructing right renal calculus. There has been interval appearance of numerous new left renal calculi and enlargement of the pre-existing renal pelvic calculus. For  example, a 2.1 cm x 1.1 cm renal pelvic calculus previously measured 1.6 cm x 0.9 cm. There is a new 1 cm x 1.7 cm renal pelvis calculus MX innumerable small calculi lying in the pelvis and within the calices. There is moderate left hydronephrosis, increased from prior study, as well as a delayed nephrogram. However, dominant renal pelvic calculus does not appear to be resulting in direct obstruction at the ureteral pelvic junction. There is interval worsening of left perinephric fat stranding. 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   REPRODUCTIVE ORGANS: No significant abnormality.   RETROPERITONEUM/LYMPH NODES: No acute retroperitoneal abnormality. No enlarged lymph nodes.   BOWEL/PERITONEUM: There is small bowel and colonic diverticulosis without evidence for diverticulitis.  No evidence of active gastrointestinal hemorrhage. No inflammatory bowel wall thickening or dilatation. Normal appendix.   No ascites, free air, or fluid collection.     MUSCULOSKELETAL: No acute osseous abnormality. Healed fractures of right L2, L3, L4 transverse processes. Multilevel degenerative changes of the thoracic and lumbar spine without high-grade canal stenosis. No suspicious osseous lesion.       1. No evidence of active gastrointestinal hemorrhage.   2. Worsening left hydronephrosis and perinephric inflammatory fat stranding. There is appearance of numerous new nonobstructing left renal calculi and interval enlargement of a dominant renal pelvic calculus that extensive the UPJ and could be resulting in intermittent obstruction given delayed nephrogram. However, delayed nephrogram could also be result of globally poor renal function secondary to the aforementioned on a chronic basis. No areas of focal parenchymal hypoenhancement to suggest acute pyelonephritis though correlation with clinical factors intraorally.   3. Nonobstructing subcentimeter  right renal calculus.   4. Diverticulosis of the small bowel and colon without evidence of acute diverticulitis.   MACRO: None.   Signed by: Albaro Norman 6/20/2025 7:44 PM Dictation workstation:   [id number]    ECG 12 lead  Result Date: 6/20/2025  Atrial fibrillation with rapid ventricular response Left axis deviation Abnormal QRS-T angle, consider primary T wave abnormality Abnormal ECG When compared with ECG of 20-JAN-2025 11:16, QRS axis Shifted left          Past Cardiology Tests (Last 3 Years):  EKG:  Results for orders placed during the hospital encounter of 06/20/25    ECG 12 lead (Preliminary)  This result has not been signed. Information might be incomplete.    Narrative  Atrial fibrillation with rapid ventricular response  Left axis deviation  Abnormal QRS-T angle, consider primary T wave abnormality  Abnormal ECG  When compared with ECG of 20-JAN-2025 11:16,  QRS axis Shifted left      Results for orders placed in visit on 01/20/25    ECG 12 lead (Clinic Performed)    Narrative  Atrial fibrillation with rapid ventricular response  Nonspecific ST and T wave abnormality , probably digitalis effect  Abnormal ECG  When compared with ECG of 02-JAN-2025 09:27,  Atrial fibrillation has replaced Sinus rhythm  QRS axis Shifted right  Criteria for Lateral infarct are no longer Present  Confirmed by Acosta Alcantara ([id number]) on 1/31/2025 10:43:40 AM      Results for orders placed during the hospital encounter of 12/31/24    ECG 12 Lead    Narrative   Suspect arm lead reversal, interpretation assumes no reversal  Sinus rhythm with 1st degree AV block with Premature atrial complexes  Lateral infarct , age undetermined  Abnormal ECG  When compared with ECG of 01-JAN-2025 08:51,  Sinus rhythm has replaced Atrial fibrillation  QRS axis Shifted left  Lateral infarct is now Present  Criteria for Inferior infarct are no longer Present  Confirmed by Simone Shahid ([id number]) on 1/8/2025 7:58:38 AM      ECG 12  Lead    Narrative  Atrial fibrillation with rapid ventricular response  Inferior infarct , age undetermined  Abnormal ECG  When compared with ECG of 04-NOV-2022 10:14,  Atrial fibrillation has replaced Sinus rhythm  QRS axis Shifted left  Inferior infarct is now Present  Nonspecific T wave abnormality now evident in Inferior leads  Confirmed by Ludwin Calderon ([id number]) on 1/2/2025 9:32:29 AM    Echo:  Results for orders placed during the hospital encounter of 12/31/24    Transesophageal Echo (SANTOS) With Possible Cardioversion    Narrative  [facility], [address] [place] [address]  Tel [phone] and Fax [phone]    TRANSESOPHAGEAL ECHOCARDIOGRAM REPORT      Patient Name:       ALAN OLMSTEAD LUCRECIA   Reading Physician:    [id number] Ludwin Calderon MD  Study Date:         1/2/2025            Ordering Provider:    [id number] LATA MEDEROS  MRN/PID:            [id number]            Fellow:  Accession#:         [id number]        Nurse:  Date of Birth/Age:  1945 / 79      Sonographer:          Winifred olmos                                     ADRIANA  Gender assigned at  F                   Additional Staff:  Birth:  Height:             165.10 cm           Admit Date:           12/31/2024  Weight:             117.48 kg           Admission Status:     Inpatient -  Routine  BSA / BMI:          2.21 m2 / 43.10     Encounter#:           [id number]  kg/m2  Blood Pressure:     134/77 mmHg         Department Location:    Study Type:    TRANSESOPHAGEAL ECHO (SANTOS) W/ POSSIBLE CARDIOVERSION  Diagnosis/ICD: Unspecified atrial fibrillation-I48.91; Abnormal  electrocardiogram [ECG] [EKG]-R94.31; Nonrheumatic aortic (valve)  stenosis-I35.0  Indication:    Afib, AS, Abn EKG  CPT Code:      SANTOS Complete-04952; Color Doppler-77121; Doppler Limited-59563  Study Detail: The following Echo studies were performed: 2D, Doppler and color  flow. Agitated saline used as a contrast agent for intraseptal  flow  evaluation. The patient was sedated.      PHYSICIAN INTERPRETATION:  SANTOS Details: The SANTOS probe used was [id number]. Technically adequate omniplane transesophageal echocardiogram performed. Agitated saline contrast and color flow Doppler echo was performed to assess for the presence of a patent foramen ovale.  SANTOS Medication: The patient was sedated by Anesthesia; please refer to anesthesia flow sheet for medications used. Total intraservice time for moderate sedation was 15 minutes.  SANTOS Procedure: The probe was passed without difficulty. Complications encountered during procedure: Patient tolerated the procedure well without any apparent complications.  Left Ventricle: The left ventricular systolic function is normal, with a visually estimated ejection fraction of 55-60%. The left ventricular cavity size was not assessed. Left ventricular diastolic filling was not assessed.  Left Atrium: The left atrium enlarged. A bubble study using agitated saline was performed. Bubble study is negative. The left atrial appendage Doppler velocities are normal and there is no thrombus visualized in the left atrial appendage.  Right Ventricle: The right ventricle is normal in size. There is normal right ventricular global systolic function.  Right Atrium: The right atrium enlarged.  Aortic Valve: The aortic valve is trileaflet. There is moderate aortic valve cusp calcification. There is no evidence of aortic valve regurgitation.  Mitral Valve: The mitral valve is mild to moderately thickened. There is mild to moderate mitral annular calcification. The peak instantaneous gradient of the mitral valve is 5 mmHg. There is mild mitral valve regurgitation.  Tricuspid Valve: The tricuspid valve is structurally normal. There is mild tricuspid regurgitation. The Doppler estimated RVSP is slightly elevated at 40.6 mmHg.  Pulmonic Valve: The pulmonic valve is structurally normal. There is physiologic pulmonic valve regurgitation.  Pericardium:  Trivial to small pericardial effusion.  Aorta: The aortic root is normal. The aortic root is at the upper limits of normal size. There is plaque visualized in the ascending aorta, which is classified as a Grade 2 [mild (focal or diffuse) intimal thickening of 2-3 mm] atherosclerosis. There is plaque visualized in the descending aorta which is classified as a Grade 2 [mild (focal or diffuse) intimal thickening of 2-3 mm] atherosclerosis.  Pulmonary Veins: The pulmonary veins appear normal and return normally to the left atrium.      CONCLUSIONS:  1. The left ventricular systolic function is normal, with a visually estimated ejection fraction of 55-60%.  2. There is normal right ventricular global systolic function.  3. Slightly elevated right ventricular systolic pressure.  4. There is moderate aortic valve cusp calcification.  5. There is plaque visualized in the ascending aorta.  6. There is plaque visualized in the descending aorta.    QUANTITATIVE DATA SUMMARY:    LV SYSTOLIC FUNCTION BY 2D PLANIMETRY (MOD):  Normal Ranges:  EF-Visual:      58 %  LV EF Reported: 58 %      MITRAL VALVE:          Normal Ranges:  MV Vmax:      1.13 m/s (<=1.3m/s)  MV peak P.1 mmHg (<5mmHg)  MV mean P.4 mmHg (<2mmHg)  MV VTI:       14.93 cm (10-13cm)      TRICUSPID VALVE/RVSP:          Normal Ranges:  Peak TR Velocity:     3.07 m/s  RV Syst Pressure:     41 mmHg  (< 30mmHg)      AORTA:  Asc Ao Diam 3.87 cm      Zabrina Calderon MD  Electronically signed on 2025 at 10:58:38 AM        ** Final **      Transthoracic Echo (TTE) Complete    [facility], [address] [place] [address]  Tel [phone] and Fax [phone]    TRANSTHORACIC ECHOCARDIOGRAM REPORT      Patient Name:       ALAN SINGER   Reading Physician:    Zabrina Calderon MD  Study Date:         2025            Ordering Provider:    [id number] KATY MITCHELL  MRN/PID:            [id number]             Fellow:  Accession#:         [id number]        Nurse:                Danielle Eric  Date of Birth/Age:  1945 / 79      Sonographer:          Winifred olmos                                     [facility]  Gender assigned at  F                   Additional Staff:  Birth:  Height:             139.70 cm           Admit Date:           12/31/2024  Weight:             117.48 kg           Admission Status:     Inpatient -  Routine  BSA / BMI:          1.96 m2 / 60.20     Encounter#:           [id number]  kg/m2  Blood Pressure:     134/77 mmHg         Department Location:  [facility] Non  Invasive    Study Type:    TRANSTHORACIC ECHO (TTE) COMPLETE  Diagnosis/ICD: Unspecified atrial fibrillation-I48.91; Heart failure,  unspecified-I50.9; Other forms of dyspnea-R06.09  Indication:    Afib, CHF, , Dyspnea on Exertion  CPT Code:      Echo Complete w Full Doppler-08395    Patient History:  Pertinent History: HTN, Hyperlipidemia, A-Fib, Dyspnea and Chronic Lymphedema.    Study Detail: The following Echo studies were performed: 2D, M-Mode, Doppler and  color flow. Technically challenging study due to body habitus,  patient lying in supine position and prominent lung artifact.  Definity used as a contrast agent for endocardial border  definition. Total contrast used for this procedure was 1.0 mL via  IV push. Unable to obtain suprasternal notch view. The patient was  awake.      PHYSICIAN INTERPRETATION:  Left Ventricle: The left ventricular systolic function is normal, with a visually estimated ejection fraction of 55-60%. The left ventricular cavity size is normal. There is normal septal and normal posterior left ventricular wall thickness. Spectral Doppler shows a Grade II (pseudonormal pattern) of left ventricular diastolic filling.  Left Atrium: The left atrium was not well visualized.  Right Ventricle: The right ventricle was not well visualized. There is normal right ventricular global systolic function.  Right  Atrium: The right atrium was not well visualized.  Aortic Valve: The aortic valve is probably trileaflet. There is mild to moderate aortic valve cusp calcification. The aortic valve dimensionless index is 0.83. There is no evidence of aortic valve regurgitation. The peak instantaneous gradient of the aortic valve is 2 mmHg. The mean gradient of the aortic valve is 1 mmHg. Unable to assess the severity of aortic stenosis.  Mitral Valve: The mitral valve is mildly thickened. There is mild mitral annular calcification. There is trace to mild mitral valve regurgitation.  Tricuspid Valve: The tricuspid valve was not well visualized. Tricuspid regurgitation was not assessed.  Pulmonic Valve: The pulmonic valve is not well visualized. There is physiologic pulmonic valve regurgitation.  Pericardium: Trivial pericardial effusion.  Aorta: The aortic root is normal.  Systemic Veins: The inferior vena cava appears normal in size, with IVC inspiratory collapse greater than 50%.  In comparison to the previous echocardiogram(s): There are no prior studies on this patient for comparison purposes.      CONCLUSIONS:  1. Poorly visualized anatomical structures due to suboptimal image quality.  2. The left ventricular systolic function is normal, with a visually estimated ejection fraction of 55-60%.  3. Spectral Doppler shows a Grade II (pseudonormal pattern) of left ventricular diastolic filling.  4. There is normal right ventricular global systolic function.    QUANTITATIVE DATA SUMMARY:    2D MEASUREMENTS:          Normal Ranges:  IVSd:            0.92 cm  (0.6-1.1cm)  LVPWd:           0.94 cm  (0.6-1.1cm)  LVIDd:           5.23 cm  (3.9-5.9cm)  LVIDs:           2.61 cm  LV Mass Index:   91 g/m2  LVEDV Index:     25 ml/m2  LV % FS          50.2 %      AORTA MEASUREMENTS:         Normal Ranges:  Ao Sinus, d:        2.90 cm (2.1-3.5cm)  Asc Ao, d:          3.60 cm (2.1-3.4cm)      LV SYSTOLIC FUNCTION BY 2D PLANIMETRY (MOD):  Normal  Ranges:  EF-A4C View:    56 % (>=55%)  EF-A2C View:    55 %  EF-Biplane:     54 %  EF-Visual:      58 %  LV EF Reported: 58 %      LV DIASTOLIC FUNCTION:            Normal Ranges:  MV Peak E:             0.87 m/s   (0.7-1.2 m/s)  MV Peak A:             0.30 m/s   (0.42-0.7 m/s)  E/A Ratio:             2.85       (1.0-2.2)  MV A Dur:              68.51 msec      MITRAL VALVE:          Normal Ranges:  MV DT:        138 msec (150-240msec)      AORTIC VALVE:                     Normal Ranges:  AoV Vmax:                0.67 m/s (<=1.7m/s)  AoV Peak P.8 mmHg (<20mmHg)  AoV Mean P.0 mmHg (1.7-11.5mmHg)  LVOT Max Kapil:            0.59 m/s (<=1.1m/s)  AoV VTI:                 13.26 cm (18-25cm)  LVOT VTI:                11.06 cm  LVOT Diameter:           1.64 cm  (1.8-2.4cm)  AoV Area, VTI:           1.75 cm2 (2.5-5.5cm2)  AoV Area,Vmax:           1.87 cm2 (2.5-4.5cm2)  AoV Dimensionless Index: 0.83      RIGHT VENTRICLE:  RV s' 0.08 m/s      TRICUSPID VALVE/RVSP:          Normal Ranges:  Peak TR Velocity:     1.93 m/s  RV Syst Pressure:     23 mmHg  (< 30mmHg)  IVC Diam:             1.80 cm      PULMONIC VALVE:          Normal Ranges:  PV Max Kapil:     0.6 m/s  (0.6-0.9m/s)  PV Max P.5 mmHg      AORTA:  Asc Ao Diam 3.63 cm      [id number] Ludwin Calderon MD  Electronically signed on 2025 at 10:50:16 AM        ** Final (Updated) **    Ejection Fractions:  LV EF   Date/Time Value Ref Range Status   2025 10:03 AM 58 %    2025 09:26 AM 58 %      Cath:  No results found for this or any previous visit.    Stress Test:  Results for orders placed in visit on 22    Nuclear Stress Test    Narrative  MRN: [id number]  Patient Name: ALAN SINGER    STUDY:  CARDIAC STRESS/REST INJECTION; PART 2 STRESS OR REST (NO CHARGE);  CARDIAC STRESS/REST (MYOCARDIAL PERFUSION/MIBI);  2022 10:54 am    INDICATION:  shortness of breath with any exertion  R60.9: Edema R07.89: Atypical  chest  pain R06.02: SOB (shortness of breath) on exertion.    COMPARISON:  None.    ACCESSION NUMBER(S):  [id number]; [id number]; [id number]    ORDERING CLINICIAN:  EZEKIEL CAMPO    TECHNIQUE:  DIVISION OF NUCLEAR MEDICINE  PHARMACOLOGIC STRESS MYOCARDIAL PERFUSION SCAN, ONE DAY PROTOCOL    The patient received an intravenous dose of  11.2 mCi of Tc-99m  tetrofosmin and resting emission tomographic (SPECT) images of the  myocardium were acquired. The patient then received an intravenous  infusion of 0.4mg regadenoson (Lexiscan) followed by an additional  dose of  34.5 mCi of Tc-99m tetrofosmin. Stress phase SPECT images of  the myocardium were then acquired. These included ECG-gated images to  assess and quantify ventricular function.    FINDINGS:  Stress images demonstrate a normal distribution of perfusion  throughout all LV segments with no sign of ischemia.    ECG-gated images demonstrate normal LV size and myocardial  contractility with an LV ejection fraction of   75 % (normal above 50  percent).    Impression  1. Normal stress myocardial perfusion imaging in response to  pharmacologic stress.  2. Calculated ejection fraction is 75% without segmental wall motion  abnormality seen.    Cardiac Imaging:  No results found for this or any previous visit.      Inpatient Medications:  Scheduled Medications[1]  PRN Medications[2]  Continuous Medications[3]    Physical Exam:  General:  Patient is awake, alert, and oriented.  Patient is in no acute distress.  HEENT:  Pupils equal and reactive.  Normocephalic.  Moist mucosa.    Neck:  No thyromegaly.  Normal Jugular Venous Pressure.  Cardiovascular:  Regular rate and rhythm.  Normal S1 and S2.  Pulmonary:  Clear to auscultation bilaterally.  Abdomen:  Soft. Non-tender.   Non-distended.  Positive bowel sounds.  Lower Extremities:  2+ pedal pulses. No LE edema.  Neurologic:  Cranial nerves intact.  No focal deficit.   Skin: Skin warm and dry, normal skin turgor.   Psychiatric: Normal  "affect.     Assessment/Plan   Vital signs, telemetry, medications, labs, and imaging were reviewed as well.     Millie Riley is a 79 y.o. female with PMHx significant for HTN, HFpEF, Afib on Eliquis with hx DCCV 1/2025, hypothyroidism, lymphedema, asthma, OA  who presented to [facility] 6/20/25 with lower abdominal and left flank pain,  workup revealed left hydronephrosis with multiple left renal calculi, DEON, possible pyelonephritis, Afib w/ RVR. She is planned for cystoscopy, ureteroscopy tomorrow Monday 6/23/25. Cardiology consulted for \"cardiac clearance for OR tomorrow.\"         Cardiologist: Dr. Gillombardo.   Outpatient cardiac medications: Torsemide 40 mg daily, Spironolactone 25 mg daily, Diltiazem 180 mg daily, Atorvastatin 40 mg daily, Eliquis 5 mg BID      #HFpEF, chronic   -Euvolemic on exam.   - Bumex, Spironolactone held per primary in setting of DEON.   -TTE 1/2025: LVEF 55-60%, pseudonormal diastolic dysfunction       #HTN, fair control on current regimen. Bumex, Spironolactone held per primary in setting of DEON.      #Paroxysmal Afib  -Recurrence of Afib/RVR this admit in the setting of possible pyelonephritis, left hydronephrosis with multiple left renal calculi, DEON  -ce telemetry   -HR uncontrolled, -120s.   -c/w  Diltiazem to 180 mg BID. Hold on rhythm control as OAC currently held in anticipation for cystoscopy, ureteroscopy today-> Last dose early AM 6/20/25)  -Resume Eliquis post cystoscopy, ureteroscopy per Urology  -Planned for [facility]man 7/18/25 with Dr. Hermosillo in the setting of previous hematuria & desire to get off OAC.      [id number] Stroke Risk Points: 5   Values used to calculate this score:    Points  Metrics       1        Has Congestive Heart Failure: Yes       1        Has Hypertension: Yes       2        Age: 79       0        Has Diabetes: No       0        Had Stroke: No                 Had TIA: No                 Had Thromboembolism: No       0        Has " Vascular Disease: No       1        Clinically Relevant Sex: Female            Plan/Recs:   -METS>4. RCRI 1, patient is at low to intermediate risk for a moderate risk procedure. No further cardiac risk stratification is needed.   -Afib rates remain uncontrolled c/w increased Diltiazem to 180 mg BID.   -Resume OAC post procedure per Urology.   -If she remains in Afib post procedure, would plan for SANTOS +/- DCCV ~earliest Tuesday if patient agreeable  -Follow up with Dr. Gillombardo in the next 4-6 weeks as outpatient.   -Outpatient LAAO/Watchman 7/18/25 as scheduled with Dr. Hermosillo in the setting of previous hematuria & desire to get off OAC.             Code Status:  Full Code  MANUEL Wyatt-MARII      =======================================  Attending Note   ====================================    Both the JULISA and I have had a face to face encounter with the patient today. I have examined the patient and edited the documented physical examination as necessary.  I personally reviewed the patient's vital signs, telemetry, recent labs, medications, orders, EKGs, and pertinent cardiac imaging/ echocardiography.  I have reviewed the JULISA's encounter note, approve the JULISA's documentation and have edited the note to reflect my diagnostic and therapeutic plan.    Surgery Delayed   She remains rate controlled from Atrial Fibrillation Standpoint   Case Discussed via Telephone with Family members after permission granted by Millie Riley   With Daughter in Law. All questions asnswered       Millie Riley is a 79 y.o. female with PMHx significant for HTN, HFpEF, Afib on Eliquis with hx DCCV 1/2025, hypothyroidism, lymphedema, asthma, OA  who presented to [facility]/20/[facility] with lower abdominal and left flank pain,  workup revealed left hydronephrosis with multiple left renal calculi, DEON, possible pyelonephritis, Afib w/ RVR. She is planned for cystoscopy, ureteroscopy tomorrow Monday 6/23/25. Cardiology consulted  "for \"cardiac clearance for OR tomorrow.\"         Cardiologist: Dr. Gillombardo.   Outpatient cardiac medications: Torsemide 40 mg daily, Spironolactone 25 mg daily, Diltiazem 180 mg daily, Atorvastatin 40 mg daily, Eliquis 5 mg BID      #HFpEF, chronic   -Euvolemic on exam.   - Bumex, Spironolactone held per primary in setting of DEON.   -TTE 1/2025: LVEF 55-60%, pseudonormal diastolic dysfunction       #HTN, fair control on current regimen. Bumex, Spironolactone held per primary in setting of DEON.      #Paroxysmal Afib  -Recurrence of Afib/RVR this admit in the setting of possible pyelonephritis, left hydronephrosis with multiple left renal calculi, DEON  -ce telemetry   -HR uncontrolled, -120s.   -c/w  Diltiazem to 180 mg BID. Hold on rhythm control as OAC currently held in anticipation for cystoscopy, ureteroscopy today-> Last dose early AM 6/20/25)  -Resume Eliquis post cystoscopy, ureteroscopy per Urology  -Planned for [facility] 7/18/25 with Dr. Hermosillo in the setting of previous hematuria & desire to get off OAC.      [id number] Stroke Risk Points: 5   Values used to calculate this score:    Points  Metrics       1        Has Congestive Heart Failure: Yes       1        Has Hypertension: Yes       2        Age: 79       0        Has Diabetes: No       0        Had Stroke: No                 Had TIA: No                 Had Thromboembolism: No       0        Has Vascular Disease: No       1        Clinically Relevant Sex: Female            Plan/Recs:   -METS>4. RCRI 1, patient is at low to intermediate risk for a moderate risk procedure. No further cardiac risk stratification is needed. There are no cardiac barriers to proceeding with surgical intervention.   -Afib rates remain uncontrolled c/w increased Diltiazem to 180 mg BID.   -Resume OAC post procedure per Urology.   -If she remains in Afib post procedure, would plan for SANTOS +/- DCCV ~earliest Tuesday if patient agreeable  -Follow up with Dr." Gillombardo in the next 4-6 weeks as outpatient.   -Outpatient [facility] 7/18/25 as scheduled with Dr. Hermosillo in the setting of previous hematuria & desire to get off OAC.          Agustin Kramer DO   Division of Cardiovascular Medicine  [facility] [place]             [1]   Scheduled medications   Medication Dose Route Frequency    [Held by provider] apixaban  5 mg oral q12h HELGA    atorvastatin  40 mg oral Daily    cefTRIAXone  1 g intravenous q24h    cholecalciferol  50 mcg oral Daily    dilTIAZem CD  180 mg oral BID    DULoxetine  60 mg oral Daily    levothyroxine  25 mcg oral Daily before breakfast    morphine  4 mg intravenous Once    ondansetron  4 mg intravenous Once    pantoprazole  40 mg oral Daily before breakfast    Or    pantoprazole  40 mg intravenous Daily before breakfast    sennosides  2 tablet oral BID    triamcinolone  1 Application Topical BID   [2]   PRN medications   Medication    acetaminophen    Or    acetaminophen    Or    acetaminophen    acetaminophen    Or    acetaminophen    Or    acetaminophen    guaiFENesin    melatonin    ondansetron    Or    ondansetron   [3]   Continuous Medications   Medication Dose Last Rate

## 2025-06-23 NOTE — TREATMENT PLAN
Talked with Dr Solis. Unfortunately not much space for OR today. Will add her on to OR schedule tomorrow. At this time I will place a diet order and NPO MN.

## 2025-06-23 NOTE — PROGRESS NOTES
"Millie Riley is a 79 y.o. female on day 3 of admission presenting with Pyelonephritis.    Subjective     [facility] rounding completed this morning.    Returned in afternoon and discussed care with patient and son (Allen) and daughter-in-law (Massiel) who were in the room.      Cystoscopy/stent placement now rescheduled for tomorrow.    Patient denies dysuria, urgency, frequency.  Slight left flank tenderness.        Objective     Physical Exam    Constitutional: NAD, pt alert and cooperative  Eyes: no icterus  Musculoskeletal: ROM intact, no joint swelling  Extremities: normal extremities, no edema  Neurological: A&O x 3, speech clear, follows commands appropriately, cr. n. grossly intact, sensation grossly intact, motor 5/5 throughout  Skin: Warm and dry, no lesions, no rashes  Psych: calm, stable mood      Last Recorded Vitals  Blood pressure 124/65, pulse 90, temperature 36.4 °C (97.6 °F), temperature source Temporal, resp. rate 19, height 1.651 m (5' 5\"), weight 113 kg (250 lb), SpO2 96%.  Intake/Output last 3 Shifts:  I/O last 3 completed shifts:  In: 100 (0.9 mL/kg) [IV Piggyback:100]  Out: - (0 mL/kg)   Weight: 113.4 kg     Relevant Results            Scheduled medications  Scheduled Medications[1]  Continuous medications  Continuous Medications[2]  PRN medications  PRN Medications[3]     Results for orders placed or performed during the hospital encounter of 06/20/25 (from the past 24 hours)   Basic Metabolic Panel   Result Value Ref Range    Glucose 99 74 - 99 mg/dL    Sodium 134 (L) 136 - 145 mmol/L    Potassium 4.7 3.5 - 5.3 mmol/L    Chloride 102 98 - 107 mmol/L    Bicarbonate 20 (L) 21 - 32 mmol/L    Anion Gap 17 10 - 20 mmol/L    Urea Nitrogen 28 (H) 6 - 23 mg/dL    Creatinine 1.45 (H) 0.50 - 1.05 mg/dL    eGFR 37 (L) >60 mL/min/1.73m*2    Calcium 8.7 8.6 - 10.3 mg/dL   POCT GLUCOSE   Result Value Ref Range    POCT Glucose 104 (H) 74 - 99 mg/dL   Urinalysis with Reflex Culture and Microscopic "   Result Value Ref Range    Color, Urine Light-Yellow Light-Yellow, Yellow, Dark-Yellow    Appearance, Urine Clear Clear    Specific Gravity, Urine 1.011 1.005 - 1.035    pH, Urine 5.5 5.0, 5.5, 6.0, 6.5, 7.0, 7.5, 8.0    Protein, Urine NEGATIVE NEGATIVE, 10 (TRACE), 20 (TRACE) mg/dL    Glucose, Urine Normal Normal mg/dL    Blood, Urine 0.03 (TRACE) (A) NEGATIVE mg/dL    Ketones, Urine NEGATIVE NEGATIVE mg/dL    Bilirubin, Urine NEGATIVE NEGATIVE mg/dL    Urobilinogen, Urine Normal Normal mg/dL    Nitrite, Urine NEGATIVE NEGATIVE    Leukocyte Esterase, Urine 25 Ava/uL (A) NEGATIVE   Microscopic Only, Urine   Result Value Ref Range    WBC, Urine 6-10 (A) 1-5, NONE /HPF    RBC, Urine 3-5 NONE, 1-2, 3-5 /HPF    Mucus, Urine FEW Reference range not established. /LPF   POCT GLUCOSE   Result Value Ref Range    POCT Glucose 97 74 - 99 mg/dL     No results found.       Assessment & Plan      Millie Riley is a 78 yo female with PMH of HTN, HFpE, atrial fibrillation s/p DCCV 1/2025 (on eliquis), hypothyroidism, lymphedema, asthma, OA, who p/w flank pain. CTA A/P showed left hydronephrosis and perineprhic inflammatory fat stranding,  numerous nonobstructing left renal calculi and enlargement of a dominant renal pelvic calculus that could be resulting in intermittent obstruction, nonobstructing right renal calculus.  She was also in atrial fibrillation with RVR with rates up to 120s, and it was noted she missed a dose of her home cardizem.  Her blood pressure was stable and she was afebrile.  Labs showed an DEON with creatinine of 1.41. No leukocytosis noted on CBC.     Pyelonephritis   Left renal calculi with hydronephrosis   DEON   - seen by urology; plans for cystoscopy/stent insertion tomorrow  (NPO after MN)   - continue ceftriaxone --> follow urine culture (noted sent today after antibiotics)   - follow blood culture   - start flomax   - monitor kidney function closely    Atrial fibrillation with RVR   - cardiology  following   - diltiazem increased to 180mg  - rates improved today to 90s  - consideration of possible DCCV following urology procedure if still warranted  - eliquis on hold for urological procedure     Dark stool   - noted in ED record   - H/H remain stable and within normal limits  - follow up with PCP as warranted    Hypertension   - blood pressures stable   - spironolactone on hold due to DEON    HFpEF   Lymphedema  - torsemide and spironolactone on hold due to DEON   - BARTOLOME hose recommended    Hypothyroidism   - continue synthroid     Asthma   - stable, not in exacerbation     OA   - stable, tylenol as needed     VTE/GI prophylaxis   - eliquis on hold for urology procedure   - bowel regimen in place     Discharge planning   - plan to discharge home when medically stable     Discussed plan of care with Dr. Xavier, nursing, Cardiology (Dr. Kramer and Rachelle CUTLER)           I spent 60 minutes in the professional and overall care of this patient.      Paris Wang, APRN-CNP         [1] [Held by provider] apixaban, 5 mg, oral, q12h HELGA  atorvastatin, 40 mg, oral, Daily  cefTRIAXone, 1 g, intravenous, q24h  cholecalciferol, 50 mcg, oral, Daily  dilTIAZem CD, 180 mg, oral, BID  DULoxetine, 60 mg, oral, Daily  levothyroxine, 25 mcg, oral, Daily before breakfast  morphine, 4 mg, intravenous, Once  ondansetron, 4 mg, intravenous, Once  pantoprazole, 40 mg, oral, Daily before breakfast   Or  pantoprazole, 40 mg, intravenous, Daily before breakfast  sennosides, 2 tablet, oral, BID  triamcinolone, 1 Application, Topical, BID  [2]    [3] PRN medications: acetaminophen **OR** acetaminophen **OR** acetaminophen, acetaminophen **OR** acetaminophen **OR** acetaminophen, guaiFENesin, melatonin, ondansetron **OR** ondansetron

## 2025-06-23 NOTE — PROGRESS NOTES
Millie Riley is a 79 y.o. female     Flank pain persists  But improved somewhat  Denies any chest pain palpitations    Review of Systems     Constitutional: no fever, no chills, not feeling poorly, not feeling tired   Cardiovascular: no chest pain   Respiratory: no cough, wheezing or shortness of breath a  Gastrointestinal: Flank pain  Neurological: no headache,   All other systems have been reviewed and are negative for complaint.       Vitals:    06/23/25 1145   BP: 108/64   Pulse: 89   Resp: 21   Temp: 36.6 °C (97.9 °F)   SpO2: 97%        Scheduled medications  Scheduled Medications[1]  Continuous medications  Continuous Medications[2]  PRN medications  PRN Medications[3]    Lab Review   Results from last 7 days   Lab Units 06/22/25  0848 06/21/25  0548 06/20/25  1642   WBC AUTO x10*3/uL 9.9 8.0 9.3   HEMOGLOBIN g/dL 13.0 13.3 14.4   HEMATOCRIT % 40.2 40.6 45.5   PLATELETS AUTO x10*3/uL 221 236 265     Results from last 7 days   Lab Units 06/23/25  0634 06/22/25  0848 06/21/25  0548 06/20/25  1642   SODIUM mmol/L 134* 136 138 136   POTASSIUM mmol/L 4.7 4.6 4.4 4.8   CHLORIDE mmol/L 102 103 104 101   CO2 mmol/L 20* 21 22 27   BUN mg/dL 28* 28* 31* 33*   CREATININE mg/dL 1.45* 1.44* 1.58* 1.41*   CALCIUM mg/dL 8.7 9.0 9.0 9.1   PROTEIN TOTAL g/dL  --   --   --  7.6   BILIRUBIN TOTAL mg/dL  --   --   --  0.6   ALK PHOS U/L  --   --   --  94   ALT U/L  --   --   --  29   AST U/L  --   --   --  23   GLUCOSE mg/dL 99 109* 105* 123*            CT angio abdomen pelvis w and or wo IV IV contrast   Final Result   1. No evidence of active gastrointestinal hemorrhage.        2. Worsening left hydronephrosis and perinephric inflammatory fat   stranding. There is appearance of numerous new nonobstructing left   renal calculi and interval enlargement of a dominant renal pelvic   calculus that extensive the UPJ and could be resulting in   intermittent obstruction given delayed nephrogram. However, delayed   nephrogram could  also be result of globally poor renal function   secondary to the aforementioned on a chronic basis. No areas of focal   parenchymal hypoenhancement to suggest acute pyelonephritis though   correlation with clinical factors intraorally.        3. Nonobstructing subcentimeter right renal calculus.        4. Diverticulosis of the small bowel and colon without evidence of   acute diverticulitis.        MACRO:   None.        Signed by: Albaro Norman 6/20/2025 7:44 PM   Dictation workstation:   [id number]      FL less than 1 hour    (Results Pending)         Physical Exam    Constitutional   General appearance: Alert and in no acute distress.   Pulmonary   Respiratory assessment: No respiratory distress, normal respiratory rhythm and effort.    Auscultation of Lungs: Clear bilateral breath sounds.   Cardiovascular   Auscultation of heart: Apical pulse normal, heart rate and rhythm normal, normal S1 and S2, no murmurs and no pericardial rub.    Exam for edema: Plus edema with stasis dermatitis  Abdomen   Abdominal Exam: No bruits, normal bowel sounds, soft, non-tender, no abdominal mass palpated.    Liver and Spleen exam: No hepato-splenomegaly.   Positive left flank tenderness  Musculoskeletal     Inspection/palpation of joints, bones and muscles: No joint swelling. Normal movement of all extremities.   Neurologic   Cranial nerves: Nerves 2-12 were intact, no focal neuro defects.         Assessment/Plan        #Pyelonephritis  #Hydronephrosis with nephrolithiasis  Continue IV antibiotics pain control  Scheduled for cystoscopy with possible stent placement today    #Atrial fibrillation  Rate controlled  Holding Eliquis for impending surgery    #Chronic diastolic congestive heart failure  Stable and euvolemic  Holding medications because of kidney injury    #Acute kidney injury  Secondary to above  Holding diuretics for now    #Hypothyroidism  #Lymphedema  Continue current medications  Recommend compression  stockings       [1] [Held by provider] apixaban, 5 mg, oral, q12h HELGA  atorvastatin, 40 mg, oral, Daily  cefTRIAXone, 1 g, intravenous, q24h  cholecalciferol, 50 mcg, oral, Daily  dilTIAZem CD, 180 mg, oral, BID  DULoxetine, 60 mg, oral, Daily  levothyroxine, 25 mcg, oral, Daily before breakfast  morphine, 4 mg, intravenous, Once  ondansetron, 4 mg, intravenous, Once  pantoprazole, 40 mg, oral, Daily before breakfast   Or  pantoprazole, 40 mg, intravenous, Daily before breakfast  sennosides, 2 tablet, oral, BID  triamcinolone, 1 Application, Topical, BID    [2]    [3] PRN medications: acetaminophen **OR** acetaminophen **OR** acetaminophen, acetaminophen **OR** acetaminophen **OR** acetaminophen, guaiFENesin, melatonin, ondansetron **OR** ondansetron

## 2025-06-24 ENCOUNTER — ANESTHESIA (OUTPATIENT)
Dept: OPERATING ROOM | Facility: HOSPITAL | Age: 80
DRG: 660 | End: 2025-06-24
Payer: MEDICARE

## 2025-06-24 ENCOUNTER — APPOINTMENT (OUTPATIENT)
Dept: RADIOLOGY | Facility: HOSPITAL | Age: 80
DRG: 660 | End: 2025-06-24
Payer: MEDICARE

## 2025-06-24 ENCOUNTER — APPOINTMENT (OUTPATIENT)
Dept: CARDIOLOGY | Facility: HOSPITAL | Age: 80
DRG: 660 | End: 2025-06-24
Payer: MEDICARE

## 2025-06-24 PROBLEM — F41.9 ANXIETY: Status: ACTIVE | Noted: 2025-06-24

## 2025-06-24 PROBLEM — N13.30 HYDRONEPHROSIS: Status: ACTIVE | Noted: 2025-06-24

## 2025-06-24 PROBLEM — F32.A DEPRESSION: Status: ACTIVE | Noted: 2025-06-24

## 2025-06-24 PROBLEM — N18.9 CHRONIC RENAL INSUFFICIENCY: Status: ACTIVE | Noted: 2025-06-24

## 2025-06-24 PROBLEM — Z79.01 ANTICOAGULANT LONG-TERM USE: Status: ACTIVE | Noted: 2025-06-24

## 2025-06-24 PROBLEM — E66.9 OBESITY: Status: ACTIVE | Noted: 2024-03-27

## 2025-06-24 LAB
ANION GAP SERPL CALC-SCNC: 16 MMOL/L (ref 10–20)
BACTERIA UR CULT: NO GROWTH
BUN SERPL-MCNC: 31 MG/DL (ref 6–23)
CALCIUM SERPL-MCNC: 8.7 MG/DL (ref 8.6–10.3)
CHLORIDE SERPL-SCNC: 101 MMOL/L (ref 98–107)
CO2 SERPL-SCNC: 22 MMOL/L (ref 21–32)
CREAT SERPL-MCNC: 1.67 MG/DL (ref 0.5–1.05)
EGFRCR SERPLBLD CKD-EPI 2021: 31 ML/MIN/1.73M*2
ERYTHROCYTE [DISTWIDTH] IN BLOOD BY AUTOMATED COUNT: 13.1 % (ref 11.5–14.5)
GLUCOSE SERPL-MCNC: 120 MG/DL (ref 74–99)
HCT VFR BLD AUTO: 36.9 % (ref 36–46)
HGB BLD-MCNC: 12.2 G/DL (ref 12–16)
MCH RBC QN AUTO: 30 PG (ref 26–34)
MCHC RBC AUTO-ENTMCNC: 33.1 G/DL (ref 32–36)
MCV RBC AUTO: 91 FL (ref 80–100)
NRBC BLD-RTO: 0 /100 WBCS (ref 0–0)
PLATELET # BLD AUTO: 231 X10*3/UL (ref 150–450)
POTASSIUM SERPL-SCNC: 5 MMOL/L (ref 3.5–5.3)
RBC # BLD AUTO: 4.07 X10*6/UL (ref 4–5.2)
SODIUM SERPL-SCNC: 134 MMOL/L (ref 136–145)
WBC # BLD AUTO: 6.7 X10*3/UL (ref 4.4–11.3)

## 2025-06-24 PROCEDURE — 2500000005 HC RX 250 GENERAL PHARMACY W/O HCPCS: Performed by: ANESTHESIOLOGY

## 2025-06-24 PROCEDURE — 2500000001 HC RX 250 WO HCPCS SELF ADMINISTERED DRUGS (ALT 637 FOR MEDICARE OP): Performed by: NURSE PRACTITIONER

## 2025-06-24 PROCEDURE — C1758 CATHETER, URETERAL: HCPCS | Performed by: STUDENT IN AN ORGANIZED HEALTH CARE EDUCATION/TRAINING PROGRAM

## 2025-06-24 PROCEDURE — 2720000007 HC OR 272 NO HCPCS: Performed by: STUDENT IN AN ORGANIZED HEALTH CARE EDUCATION/TRAINING PROGRAM

## 2025-06-24 PROCEDURE — 7100000001 HC RECOVERY ROOM TIME - INITIAL BASE CHARGE: Performed by: STUDENT IN AN ORGANIZED HEALTH CARE EDUCATION/TRAINING PROGRAM

## 2025-06-24 PROCEDURE — 99231 SBSQ HOSP IP/OBS SF/LOW 25: CPT

## 2025-06-24 PROCEDURE — 2550000001 HC RX 255 CONTRASTS: Mod: JW | Performed by: STUDENT IN AN ORGANIZED HEALTH CARE EDUCATION/TRAINING PROGRAM

## 2025-06-24 PROCEDURE — C1769 GUIDE WIRE: HCPCS | Performed by: STUDENT IN AN ORGANIZED HEALTH CARE EDUCATION/TRAINING PROGRAM

## 2025-06-24 PROCEDURE — 93005 ELECTROCARDIOGRAM TRACING: CPT

## 2025-06-24 PROCEDURE — 2500000001 HC RX 250 WO HCPCS SELF ADMINISTERED DRUGS (ALT 637 FOR MEDICARE OP): Performed by: FAMILY MEDICINE

## 2025-06-24 PROCEDURE — A52332 PR CYSTOSCOPY,INSERT URETERAL STENT: Performed by: ANESTHESIOLOGY

## 2025-06-24 PROCEDURE — 2500000004 HC RX 250 GENERAL PHARMACY W/ HCPCS (ALT 636 FOR OP/ED): Performed by: ANESTHESIOLOGIST ASSISTANT

## 2025-06-24 PROCEDURE — 7100000002 HC RECOVERY ROOM TIME - EACH INCREMENTAL 1 MINUTE: Performed by: STUDENT IN AN ORGANIZED HEALTH CARE EDUCATION/TRAINING PROGRAM

## 2025-06-24 PROCEDURE — 1200000002 HC GENERAL ROOM WITH TELEMETRY DAILY

## 2025-06-24 PROCEDURE — 3600000008 HC OR TIME - EACH INCREMENTAL 1 MINUTE - PROCEDURE LEVEL THREE: Performed by: STUDENT IN AN ORGANIZED HEALTH CARE EDUCATION/TRAINING PROGRAM

## 2025-06-24 PROCEDURE — 2500000002 HC RX 250 W HCPCS SELF ADMINISTERED DRUGS (ALT 637 FOR MEDICARE OP, ALT 636 FOR OP/ED): Performed by: NURSE PRACTITIONER

## 2025-06-24 PROCEDURE — 3700000001 HC GENERAL ANESTHESIA TIME - INITIAL BASE CHARGE: Performed by: STUDENT IN AN ORGANIZED HEALTH CARE EDUCATION/TRAINING PROGRAM

## 2025-06-24 PROCEDURE — 80048 BASIC METABOLIC PNL TOTAL CA: CPT | Performed by: NURSE PRACTITIONER

## 2025-06-24 PROCEDURE — 3700000002 HC GENERAL ANESTHESIA TIME - EACH INCREMENTAL 1 MINUTE: Performed by: STUDENT IN AN ORGANIZED HEALTH CARE EDUCATION/TRAINING PROGRAM

## 2025-06-24 PROCEDURE — 0T778DZ DILATION OF LEFT URETER WITH INTRALUMINAL DEVICE, VIA NATURAL OR ARTIFICIAL OPENING ENDOSCOPIC: ICD-10-PCS

## 2025-06-24 PROCEDURE — C2617 STENT, NON-COR, TEM W/O DEL: HCPCS | Performed by: STUDENT IN AN ORGANIZED HEALTH CARE EDUCATION/TRAINING PROGRAM

## 2025-06-24 PROCEDURE — A52332 PR CYSTOSCOPY,INSERT URETERAL STENT: Performed by: ANESTHESIOLOGIST ASSISTANT

## 2025-06-24 PROCEDURE — 3600000003 HC OR TIME - INITIAL BASE CHARGE - PROCEDURE LEVEL THREE: Performed by: STUDENT IN AN ORGANIZED HEALTH CARE EDUCATION/TRAINING PROGRAM

## 2025-06-24 PROCEDURE — 93010 ELECTROCARDIOGRAM REPORT: CPT | Performed by: INTERNAL MEDICINE

## 2025-06-24 PROCEDURE — 2500000005 HC RX 250 GENERAL PHARMACY W/O HCPCS: Performed by: STUDENT IN AN ORGANIZED HEALTH CARE EDUCATION/TRAINING PROGRAM

## 2025-06-24 PROCEDURE — 76000 FLUOROSCOPY <1 HR PHYS/QHP: CPT | Mod: LT

## 2025-06-24 PROCEDURE — 2500000004 HC RX 250 GENERAL PHARMACY W/ HCPCS (ALT 636 FOR OP/ED): Performed by: NURSE PRACTITIONER

## 2025-06-24 PROCEDURE — 99100 ANES PT EXTEME AGE<1 YR&>70: CPT | Performed by: ANESTHESIOLOGY

## 2025-06-24 PROCEDURE — 99231 SBSQ HOSP IP/OBS SF/LOW 25: CPT | Performed by: INTERNAL MEDICINE

## 2025-06-24 PROCEDURE — 52332 CYSTOSCOPY AND TREATMENT: CPT | Performed by: STUDENT IN AN ORGANIZED HEALTH CARE EDUCATION/TRAINING PROGRAM

## 2025-06-24 PROCEDURE — 2780000003 HC OR 278 NO HCPCS: Performed by: STUDENT IN AN ORGANIZED HEALTH CARE EDUCATION/TRAINING PROGRAM

## 2025-06-24 PROCEDURE — 85027 COMPLETE CBC AUTOMATED: CPT | Performed by: NURSE PRACTITIONER

## 2025-06-24 PROCEDURE — 36415 COLL VENOUS BLD VENIPUNCTURE: CPT | Performed by: NURSE PRACTITIONER

## 2025-06-24 DEVICE — STENT, TRIA URETHERAL, SOFT, 6 X  24: Type: IMPLANTABLE DEVICE | Site: URETER | Status: FUNCTIONAL

## 2025-06-24 RX ORDER — OXYCODONE HYDROCHLORIDE 5 MG/1
5 TABLET ORAL EVERY 4 HOURS PRN
Status: DISCONTINUED | OUTPATIENT
Start: 2025-06-24 | End: 2025-06-24 | Stop reason: HOSPADM

## 2025-06-24 RX ORDER — ONDANSETRON HYDROCHLORIDE 2 MG/ML
4 INJECTION, SOLUTION INTRAVENOUS ONCE AS NEEDED
Status: DISCONTINUED | OUTPATIENT
Start: 2025-06-24 | End: 2025-06-24 | Stop reason: HOSPADM

## 2025-06-24 RX ORDER — FENTANYL CITRATE 50 UG/ML
INJECTION, SOLUTION INTRAMUSCULAR; INTRAVENOUS AS NEEDED
Status: DISCONTINUED | OUTPATIENT
Start: 2025-06-24 | End: 2025-06-24

## 2025-06-24 RX ORDER — MEPERIDINE HYDROCHLORIDE 25 MG/ML
12.5 INJECTION INTRAMUSCULAR; INTRAVENOUS; SUBCUTANEOUS EVERY 10 MIN PRN
Status: DISCONTINUED | OUTPATIENT
Start: 2025-06-24 | End: 2025-06-24 | Stop reason: HOSPADM

## 2025-06-24 RX ORDER — METOCLOPRAMIDE HYDROCHLORIDE 5 MG/ML
10 INJECTION INTRAMUSCULAR; INTRAVENOUS ONCE AS NEEDED
Status: DISCONTINUED | OUTPATIENT
Start: 2025-06-24 | End: 2025-06-24 | Stop reason: HOSPADM

## 2025-06-24 RX ORDER — ONDANSETRON HYDROCHLORIDE 2 MG/ML
INJECTION, SOLUTION INTRAVENOUS AS NEEDED
Status: DISCONTINUED | OUTPATIENT
Start: 2025-06-24 | End: 2025-06-24

## 2025-06-24 RX ORDER — LIDOCAINE HYDROCHLORIDE 20 MG/ML
INJECTION, SOLUTION EPIDURAL; INFILTRATION; INTRACAUDAL; PERINEURAL AS NEEDED
Status: DISCONTINUED | OUTPATIENT
Start: 2025-06-24 | End: 2025-06-24

## 2025-06-24 RX ORDER — PROPOFOL 10 MG/ML
INJECTION, EMULSION INTRAVENOUS AS NEEDED
Status: DISCONTINUED | OUTPATIENT
Start: 2025-06-24 | End: 2025-06-24

## 2025-06-24 RX ORDER — MIDAZOLAM HYDROCHLORIDE 1 MG/ML
INJECTION INTRAMUSCULAR; INTRAVENOUS AS NEEDED
Status: DISCONTINUED | OUTPATIENT
Start: 2025-06-24 | End: 2025-06-24

## 2025-06-24 RX ORDER — SODIUM CHLORIDE 0.9 G/100ML
INJECTION, SOLUTION IRRIGATION AS NEEDED
Status: DISCONTINUED | OUTPATIENT
Start: 2025-06-24 | End: 2025-06-24 | Stop reason: HOSPADM

## 2025-06-24 RX ORDER — ROCURONIUM BROMIDE 10 MG/ML
INJECTION, SOLUTION INTRAVENOUS AS NEEDED
Status: DISCONTINUED | OUTPATIENT
Start: 2025-06-24 | End: 2025-06-24

## 2025-06-24 RX ORDER — LIDOCAINE HYDROCHLORIDE 10 MG/ML
0.1 INJECTION, SOLUTION EPIDURAL; INFILTRATION; INTRACAUDAL; PERINEURAL ONCE
Status: DISCONTINUED | OUTPATIENT
Start: 2025-06-24 | End: 2025-06-24 | Stop reason: HOSPADM

## 2025-06-24 RX ADMIN — TAMSULOSIN HYDROCHLORIDE 0.4 MG: 0.4 CAPSULE ORAL at 08:19

## 2025-06-24 RX ADMIN — SUGAMMADEX 400 MG: 100 INJECTION, SOLUTION INTRAVENOUS at 13:59

## 2025-06-24 RX ADMIN — PROPOFOL 140 MG: 10 INJECTION, EMULSION INTRAVENOUS at 13:38

## 2025-06-24 RX ADMIN — DEXAMETHASONE SODIUM PHOSPHATE 10 MG: 4 INJECTION, SOLUTION INTRAMUSCULAR; INTRAVENOUS at 13:43

## 2025-06-24 RX ADMIN — TRIAMCINOLONE ACETONIDE 1 APPLICATION: 1 OINTMENT TOPICAL at 08:22

## 2025-06-24 RX ADMIN — LIDOCAINE HYDROCHLORIDE 80 MG: 20 INJECTION, SOLUTION EPIDURAL; INFILTRATION; INTRACAUDAL; PERINEURAL at 13:38

## 2025-06-24 RX ADMIN — MIDAZOLAM HYDROCHLORIDE 2 MG: 1 INJECTION, SOLUTION INTRAMUSCULAR; INTRAVENOUS at 13:26

## 2025-06-24 RX ADMIN — SODIUM CHLORIDE, SODIUM LACTATE, POTASSIUM CHLORIDE, AND CALCIUM CHLORIDE: .6; .31; .03; .02 INJECTION, SOLUTION INTRAVENOUS at 13:24

## 2025-06-24 RX ADMIN — DILTIAZEM HYDROCHLORIDE 180 MG: 180 CAPSULE, COATED, EXTENDED RELEASE ORAL at 21:02

## 2025-06-24 RX ADMIN — DILTIAZEM HYDROCHLORIDE 180 MG: 180 CAPSULE, COATED, EXTENDED RELEASE ORAL at 08:19

## 2025-06-24 RX ADMIN — PANTOPRAZOLE SODIUM 40 MG: 40 TABLET, DELAYED RELEASE ORAL at 06:31

## 2025-06-24 RX ADMIN — ATORVASTATIN CALCIUM 40 MG: 40 TABLET, FILM COATED ORAL at 08:19

## 2025-06-24 RX ADMIN — LEVOTHYROXINE SODIUM 25 MCG: 0.03 TABLET ORAL at 06:31

## 2025-06-24 RX ADMIN — Medication 50 MCG: at 08:19

## 2025-06-24 RX ADMIN — SENNOSIDES 17.2 MG: 8.6 TABLET, FILM COATED ORAL at 21:02

## 2025-06-24 RX ADMIN — TRIAMCINOLONE ACETONIDE 1 APPLICATION: 1 OINTMENT TOPICAL at 21:13

## 2025-06-24 RX ADMIN — ONDANSETRON 4 MG: 2 INJECTION, SOLUTION INTRAMUSCULAR; INTRAVENOUS at 13:57

## 2025-06-24 RX ADMIN — FENTANYL CITRATE 50 MCG: 50 INJECTION, SOLUTION INTRAMUSCULAR; INTRAVENOUS at 13:38

## 2025-06-24 RX ADMIN — ROCURONIUM BROMIDE 70 MG: 10 INJECTION, SOLUTION INTRAVENOUS at 13:40

## 2025-06-24 RX ADMIN — DULOXETINE 60 MG: 30 CAPSULE, DELAYED RELEASE ORAL at 08:19

## 2025-06-24 RX ADMIN — Medication 4 L/MIN: at 14:12

## 2025-06-24 RX ADMIN — SUGAMMADEX 200 MG: 100 INJECTION, SOLUTION INTRAVENOUS at 14:05

## 2025-06-24 RX ADMIN — CEFTRIAXONE 1 G: 1 INJECTION, SOLUTION INTRAVENOUS at 21:02

## 2025-06-24 ASSESSMENT — COGNITIVE AND FUNCTIONAL STATUS - GENERAL
DAILY ACTIVITIY SCORE: 24
MOBILITY SCORE: 24

## 2025-06-24 ASSESSMENT — PAIN SCALES - GENERAL
PAINLEVEL_OUTOF10: 0 - NO PAIN
PAIN_LEVEL: 0
PAINLEVEL_OUTOF10: 0 - NO PAIN

## 2025-06-24 ASSESSMENT — PAIN - FUNCTIONAL ASSESSMENT
PAIN_FUNCTIONAL_ASSESSMENT: 0-10

## 2025-06-24 NOTE — CARE PLAN
The patient's goals for the shift include      The clinical goals for the shift include Pt will remain HDS        Problem: Pain  Goal: Takes deep breaths with improved pain control throughout the shift  Outcome: Progressing     Problem: Pain - Adult  Goal: Verbalizes/displays adequate comfort level or baseline comfort level  Outcome: Progressing     Problem: Safety - Adult  Goal: Free from fall injury  Outcome: Progressing     Problem: Fall/Injury  Goal: Not fall by end of shift  Outcome: Progressing  Goal: Be free from injury by end of the shift  Outcome: Progressing

## 2025-06-24 NOTE — ANESTHESIA POSTPROCEDURE EVALUATION
Patient: Millie Riley    Procedure Summary       Date: 06/24/25 Room / Location: [facility] A OR 09 / Virtual U A OR    Anesthesia Start: 1324 Anesthesia Stop: 1417    Procedure: Cystoscopy; Left Ureteral Stent Insertion (Left: Ureter) Diagnosis:       Kidney stone on left side      (Kidney stone on left side [N20.0])    Surgeons: Don Solis MD Responsible Provider: Wing Galvan MD    Anesthesia Type: general ASA Status: 3            Anesthesia Type: general    Vitals Value Taken Time   /69 06/24/25 14:18   Temp 36.2 °C (97.2 °F) 06/24/25 14:12   Pulse 99 06/24/25 14:30   Resp 16 06/24/25 14:15   SpO2 95 % 06/24/25 14:28   Vitals shown include unfiled device data.    Anesthesia Post Evaluation    Patient location during evaluation: bedside  Patient participation: complete - patient cannot participate  Level of consciousness: awake  Pain score: 0  Pain management: adequate  Airway patency: patent  Cardiovascular status: acceptable and hemodynamically stable  Respiratory status: acceptable and nonlabored ventilation  Hydration status: acceptable  Postoperative Nausea and Vomiting: none        No notable events documented.  Obtaining clear secretions upon suctioning OP in PACU.   Suggested incentive spirometry and continued encouragement of deep breathing and coughing.  BRENDON Galvan MD

## 2025-06-24 NOTE — DISCHARGE INSTRUCTIONS
Stent Placement:  DESCRIPTION OF PROCEDURE:  - Cystoscopy involves looking at the ureter and upper kidney with a small, medical telescope. It may be performed in order to clear the urinary tract of stones or take a biopsy of tissue. A small plastic tube called a stent was placed in the ureter. It helps drain urine from your kidney to your bladder. The stent is NOT PERMANENT and MUST BE REMOVED otherwise, permanent kidney damage can occur.     ACTIVITY :  - Today: Light to moderate activity.   - Do not drive or operate heavy machinery while taking narcotic medication or if you have discomfort that impairs your ability to make sudden movements.   - DIET - You may resume a normal diet and make sure to drink plenty of fluids.     SHOWERING AND BATHING:  It is OK to shower 48 hours after the surgery.     SIGNS and SYMPTOMS - EXPECTED:  - Dull, achy pain on the side worked on.   - Blood in the urine with passage of small clots.   - Urine may clear up and turn red a few days later.   - Burning with urination for a few days.   - Discomfort during urination may occur.   - Bladder spasms may occur in your lower abdomen at anytime.        Transesophageal Echocardiogram:  Post-Procedure and Homegoing Instructions    Please follow the instructions below after your transesophageal echocardiogram:  1) Do not drive or operate machinery for 24 hours after your procedure.  2) Do not eat or drink anything for two hours after your procedure, until ___4:10p on 6/26/25_________.  Start with a little bit of water to be sure you can swallow without coughing.  3) Avoid alcohol for at least 24 hours after the test.  5) You may have a mild sore throat for a day or two. Cough drops may help after the  two hour wait. Tylenol may also be taken once you can swallow.  6) Although the symptoms below are rare, call your doctor at once, or seek emergency  care if you have:  - Bright red blood in your sputum - Sustained chest pain  - Severe shortness  of breath - Severe abdominal pain  - Allergy symptoms (hives, rash, nausea, vomiting, difficulty breathing  7) If you are an out-patient, a nurse will have placed an IV during the study for sedation  and contrast injection. The nurse will remove the IV before she sends you home. Leave  the band-aid on for 24 hours and then check the IV site for signs of infection, such as:  ? Increasing soreness  ? Redness  ? Drainage from the puncture site    If you notice any of these conditions, you should contact your doctor immediately.               Cardioversion     Cardioversion is a non-surgical way to restore your heart's normal rhythm. Medication may be tried first to correct an irregular heartbeat, but if medicines do not work, electrical cardioversion may be needed. The electrical current should make your heartbeat normally again.      After the procedure-    Your heart rate, blood pressure and respirations will be checked frequently by the nurse until you are fully alert.      When the patches are removed form your chest, you may notice redness, irritation, or itching similar to a mild sunburn. You may You may use over the counter hydrocortisone 1% cream and apply up to three times a day for any irritation to your skin on your chest.      Discharge information-   Have an adult with you to get you home from the hospital; you will not be allowed to drive for 24 hours after the procedure.      You may resume your usual routine after discharge.      Make sure you and your family understands how you are to take your medications. The doctor will tell you what to do with your cardiac medicines and your anti-coagulation medicines.      There is a small chance your irregular heartbeat may return. If you feel skipped beats, rapid heart rate, or chest tightness, call your doctor.              Please keep your scheduled appointments with Cardiology (Dr. Gillombardo), and Urology (Dr. Wright)

## 2025-06-24 NOTE — ANESTHESIA PREPROCEDURE EVALUATION
Patient: Millie Riley    Procedure Information       Date/Time: 06/24/25 4735    Procedure: Cystoscopy; Left Ureteral Stent Insertion (Left: Ureter)    Location: [facility] A OR 09 / [facility] A OR    Surgeons: Don Solis MD            Relevant Problems   Anesthesia (within normal limits)      Cardiac   (+) Atrial fibrillation (Multi)   (+) Benign essential HTN   (+) Hyperlipidemia   (+) Murmur      Pulmonary  Bronchitis hx   (+) Asthmatic bronchitis (HHS-HCC)   (+) SOB (shortness of breath) on exertion      Neuro  insomnia   (+) Anxiety   (+) Depression      /Renal   (+) Chronic renal insufficiency (Cr 1.6)   (+) Hydronephrosis   (+) Kidney stone on left side   (+) Pyelonephritis      Endocrine   (+) Hypothyroidism   (+) Obesity      Hematology   (+) Anticoagulant long-term use      Musculoskeletal   (+) Fibromyalgia   (+) Osteoarthritis      ID   (+) Herpes zoster   (+) Pyelonephritis                                                         Pre-Anesthesia Evaluation                                         Millie Riley is a 79 y.o. female who presents for the above mentioned procedure due to Pyelonephritis [N12];DEON (acute kidney injury) [N17.9];Kidney stone on left side [N20.0]    Medical History[1]  Surgical History[2]  Social History[3]  RX Allergies[4]  Current Medications[5]  Prior to Admission medications    Medication Sig Start Date End Date Taking? Authorizing Provider   apixaban (Eliquis) 5 mg tablet Take 1 tablet (5 mg) by mouth every 12 hours. 6/10/25 8/9/25 Yes Carl B Gillombardo, MD   ascorbic acid, vitamin C, 500 mg capsule Take 1 capsule by mouth once daily.   Yes Historical Provider, MD   atorvastatin (Lipitor) 40 mg tablet Take 1 tablet (40 mg) by mouth once daily. 3/3/25 3/3/26 Yes Carl B Gillombardo, MD   cholecalciferol (Vitamin D-3) 50 MCG (2000 UT) tablet Take 1 tablet (2,000 Units) by mouth once daily.   Yes Historical Provider, MD   dilTIAZem CD (Cardizem CD) 180 mg 24 hr capsule  "Take 1 capsule (180 mg) by mouth once daily. 3/3/25 3/3/26 Yes Carl B Gillombardo, MD   DULoxetine (Cymbalta) 60 mg DR capsule TAKE 1 CAPSULE BY MOUTH ONCE DAILY. 8/23/24  Yes Azalia Way MD   levothyroxine (Synthroid, Levoxyl) 25 mcg tablet TAKE 1 TABLET BY MOUTH EVERY DAY 6/3/24  Yes Azalia Way MD   multivitamin (Daily Multi-Vitamin) tablet Take 1 tablet by mouth once daily.   Yes Historical Provider, MD   spironolactone (Aldactone) 25 mg tablet Take 1 tablet (25 mg) by mouth once daily. 3/3/25 3/3/26 Yes Carl B Gillombardo, MD   torsemide 40 mg tablet Take 40 mg by mouth once daily. 4/9/25 4/9/26 Yes Carl B Gillombardo, MD   triamcinolone (Kenalog) 0.1 % cream Apply topically 2 times a day. Apply to affected area 1-2 times daily as needed. 6/19/25  Yes Azalia Way MD     No medication comments found.  Visit Vitals  /64 (BP Location: Right arm, Patient Position: Sitting)   Pulse 90   Temp 36.5 °C (97.7 °F) (Temporal)   Resp 20   Ht 1.651 m (5' 5\")   Wt 113 kg (250 lb)   SpO2 98%   BMI 41.60 kg/m²   Smoking Status Never   BSA 2.28 m²                    Medical Gas Therapy: None (Room air)        6/24/2025     5:00 AM 6/23/2025    11:00 PM 6/23/2025     8:24 PM 6/23/2025     4:30 PM 6/23/2025    11:45 AM 6/23/2025     7:12 AM 6/23/2025     6:13 AM   BP REVIEW   BP (ultimate) 118/64 110/67 130/72 124/65 108/64 128/68 122/71     Recent Labs     06/24/25  0559 06/22/25  0848   WBC 6.7 9.9   HGB 12.2 13.0   HCT 36.9 40.2    221   MCV 91 91     Recent Labs     06/24/25  0559 06/23/25  0634 01/27/25  1233 01/02/25  0624 01/01/25  0533   EGFR 31* 37*   < > 71 84   ANIONGAP 16 17   < > 13 16   BUN 31* 28*   < > 32* 31*   CREATININE 1.67* 1.45*   < > 0.84 0.73   * 134*   < > 139 141   K 5.0 4.7   < > 4.0 3.5    102   < > 102 104   CO2 22 20*   < > 28 25   GLUCOSE 120* 99   < > 111* 105*   CALCIUM 8.7 8.7   < > 8.9 8.6   PHOS  --   --   --  3.9 3.5    < > = values in this " "interval not displayed.     Recent Labs     06/23/25  1904 04/03/24  1053 03/20/23  1542 06/13/22  1159 05/28/22  0841   HGBA1C  --  5.6  --   --   --    TSH 2.08 1.91   < >  --  1.64   FREET4  --   --   --   --  1.13   BNP  --   --   --  26  --     < > = values in this interval not displayed.     Recent Labs     06/20/25  1642 03/03/25  1215   BILITOT 0.6 0.5   PROT 7.6 7.4   ALBUMIN 4.2 4.3   ALKPHOS 94 92   ALT 29 31*   AST 23 22     Recent Labs     06/20/25  1642   PROTIME 14.4*   INR 1.3*     Lab Results   Component Value Date    TIBC 392 06/20/2025    IRONSAT 14 (L) 06/20/2025     No results found for: \"STAPHMRSASCR\"  No results for input(s): \"AMPHETAMINE\", \"MAMPHBLDS\", \"BARBITURATE\", \"BENZODIAZ\", \"BUPRENBLDS\", \"CANNABBLDS\", \"COCBLDS\", \"METHABLDS\", \"OXYBLDS\", \"PCPBLDS\", \"OPIATBLDS\", \"DRBLDCOMM\" in the last 97174 hours.  No results for input(s): \"PHART\", \"BTU0KVA\", \"PO2ART\", \"HNQ8GXD\", \"R0SMWCBD\", \"BEART\", \"S7DBZRXG\" in the last 93691 hours.      Lab Results   Component Value Date    ABO O 06/20/2025     EKG   Encounter Date: 06/20/25   ECG 12 lead   Result Value    Ventricular Rate 101    QRS Duration 80    QT Interval 340    QTC Calculation(Bazett) 440    R Axis -56    T Axis 98    QRS Count 17    Q Onset 214    T Offset 384    QTC Fredericia 404    Narrative    Atrial fibrillation with rapid ventricular response  Left axis deviation  Abnormal QRS-T angle, consider primary T wave abnormality  Abnormal ECG  When compared with ECG of 20-JAN-2025 11:16,  QRS axis Shifted left     Ejection Fractions:  LV EF   Date/Time Value Ref Range Status   01/02/2025 10:03 AM 58 %    01/02/2025 09:26 AM 58 %      Echocardiogram  Echocardiogram     Narrative  [facility], [address], [place], [place] [address]  Tel [phone] and Fax [phone]    TRANSTHORACIC ECHOCARDIOGRAM REPORT      Patient Name:     ALAN SINGER Reading Physician:   [id number] Uriel Garg MD  Study Date:       6/17/2022    "      Referring Physician: EZEKIEL CAMPO  MRN/PID:          [id number]          PCP:  Accession/Order#: [id number]      Department Location: [facility]  YOB: 1945         Fellow:  Gender:           F                 Nurse:  Admit Date:                         Sonographer:         Danielle Drake RDCS  Admission Status: Outpatient        Additional Staff:  Height:           162.56 cm         CC Report to:  Weight:           117.48 kg         Study Type:          Echocardiogram  BSA:              2.18 m2  Blood Pressure: 129 /73 mmHg    Diagnosis/ICD: R60.0-Localized edema  Indication:    Acute edema of lung; Edema R60.9; Lymphedema due to chronic  inflammation; Morbid obesity with BMI of 40.0-44.9, adult  Procedure/CPT: Echo Complete w Full Doppler-[id number]    Patient History:  Pertinent History: HLD; LE Edema; Murmur.    Study Detail: The following Echo studies were performed: 2D, M-Mode, Doppler and  color flow. Technically challenging study due to body habitus.      PHYSICIAN INTERPRETATION:  Left Ventricle: The left ventricular systolic function is normal, with an estimated ejection fraction of 60-65%. There are no regional wall motion abnormalities. The left ventricular cavity size is normal. Spectral Doppler shows an impaired relaxation pattern of left ventricular diastolic filling.  Left Atrium: The left atrium is normal in size.  Right Ventricle: The right ventricle is normal in size. There is normal right ventricular global systolic function.  Right Atrium: The right atrium is normal in size.  Aortic Valve: The aortic valve is probably trileaflet. There is moderate aortic valve cusp calcification. There is moderate aortic valve thickening. There is evidence of mild aortic valve stenosis.  The peak aortic velocity was obtained from the right parasternal view. There is no evidence of aortic valve regurgitation. The peak instantaneous gradient of the aortic valve is 27.4 mmHg. The mean  gradient of the aortic valve is 14.7 mmHg.  Mitral Valve: The mitral valve is normal in structure. There is no evidence of mitral valve regurgitation.  Tricuspid Valve: The tricuspid valve is structurally normal. There is trace tricuspid regurgitation. The right ventricular systolic pressure is unable to be estimated.  Pulmonic Valve: The pulmonic valve is not well visualized. There is physiologic pulmonic valve regurgitation.  Pericardium: There is a trivial pericardial effusion.  Aorta: The aortic root is normal. There is mild dilatation of the ascending aorta.  Pulmonary Artery: The pulmonary artery is not well visualized.  Systemic Veins: The inferior vena cava appears to be of normal size. There is IVC inspiratory collapse greater than 50%.  In comparison to the previous echocardiogram(s): Compared with study from 3/21/2021,. Mild aortic stenosis is not present. Aortic sclerosis seen on prior study.      CONCLUSIONS:  1. The left ventricular systolic function is normal with a 60-65% estimated ejection fraction.  2. Spectral Doppler shows an impaired relaxation pattern of left ventricular diastolic filling.  3. Mild aortic valve stenosis.  4. There is moderate aortic valve cusp calcification.  5. The pulmonary artery is not well visualized.    QUANTITATIVE DATA SUMMARY:  2D MEASUREMENTS:  Normal Ranges:  Ao Root d:     3.40 cm   (2.0-3.7cm)  LAs:           3.12 cm   (2.7-4.0cm)  IVSd:          1.73 cm   (0.6-1.1cm)  LVPWd:         1.19 cm   (0.6-1.1cm)  LVIDd:         3.83 cm   (3.9-5.9cm)  LVIDs:         2.53 cm  LV Mass Index: 95.9 g/m2  LV % FS        34.0 %    LA VOLUME:  Normal Ranges:  LA Vol A4C:        57.7 ml    (22+/-6mL/m2)  LA Vol A2C:        53.1 ml  LA Vol BP:         56.4 ml  LA Vol Index A4C:  26.4ml/m2  LA Vol Index A2C:  24.3 ml/m2  LA Vol Index BP:   25.8 ml/m2  LA Area A4C:       18.6 cm2  LA Area A2C:       18.2 cm2  LA Major Axis A4C: 5.1 cm  LA Major Axis A2C: 5.3 cm  LA Volume Index:    26.2 ml/m2  LA Vol A4C:        53.4 ml  LA Vol A2C:        50.6 ml    RA VOLUME BY A/L METHOD:  Normal Ranges:  RA Area A4C: 16.6 cm2    M-MODE MEASUREMENTS:  Normal Ranges:  Ao Root: 3.40 cm (2.0-3.7cm)    AORTA MEASUREMENTS:  Normal Ranges:  Asc Ao, d: 3.90 cm (2.1-3.4cm)    LV SYSTOLIC FUNCTION BY 2D PLANIMETRY (MOD):  Normal Ranges:  EF-A4C View: 66.6 % (>55%)  EF-A2C View: 61.9 %  EF-Biplane:  64.2 %    LV DIASTOLIC FUNCTION:  Normal Ranges:  MV Peak E:    0.71 m/s    (0.7-1.2 m/s)  MV Peak A:    0.83 m/s    (0.42-0.7 m/s)  E/A Ratio:    0.86        (1.0-2.2)  MV e'         0.10 m/s    (>8.0)  MV lateral e' 0.11 m/s  MV medial e'  0.08 m/s  MV A Dur:     133.21 msec  E/e' Ratio:   7.45        (<8.0)  MV DT:        246 msec    (150-240 msec)    AORTIC VALVE:  Normal Ranges:  AoV Vmax:                2.62 m/s  (<1.7m/s)  AoV Peak P.4 mmHg (<20mmHg)  AoV Mean P.7 mmHg (1.7-11.5mmHg)  LVOT Max Kapil:            1.44 m/s  (<1.1m/s)  AoV VTI:                 53.14 cm  (18-25cm)  LVOT VTI:                25.41 cm  LVOT Diameter:           2.11 cm   (1.8-2.4cm)  AoV Area, VTI:           1.67 cm2  (2.5-5.5cm2)  AoV Area,Vmax:           1.91 cm2  (2.5-4.5cm2)  AoV Dimensionless Index: 0.48    RIGHT VENTRICLE:  RV 1   3.3 cm  RV 2   2.1 cm  RV 3   6.2 cm  TAPSE: 19.0 mm  RV s'  0.12 m/s    PULMONIC VALVE:  Normal Ranges:  PV Accel Time: 82 msec  (>120ms)  PV Max Kapil:    1.0 m/s  (0.6-0.9m/s)  PV Max PG:     3.7 mmHg    AORTA:  Asc Ao Diam 3.86 cm      [id number] Uriel Garg MD  Electronically signed on 2022 at 3:07:44 PM            Stress Testing  NM cardiac stress rest (myocardial perfusion MIBI) 2022    Narrative  MRN: [id number]  Patient Name: ALAN SINGER    STUDY:  CARDIAC STRESS/REST INJECTION; PART 2 STRESS OR REST (NO CHARGE);  CARDIAC STRESS/REST (MYOCARDIAL PERFUSION/MIBI);  2022 10:54 am    INDICATION:  shortness of breath with any exertion  R60.9: Edema  "R07.89: Atypical  chest pain R06.02: SOB (shortness of breath) on exertion.    COMPARISON:  None.    ACCESSION NUMBER(S):  [id number]; [id number]; [id number]    ORDERING CLINICIAN:  EZEKIEL CAMPO    TECHNIQUE:  DIVISION OF NUCLEAR MEDICINE  PHARMACOLOGIC STRESS MYOCARDIAL PERFUSION SCAN, ONE DAY PROTOCOL    The patient received an intravenous dose of  11.2 mCi of Tc-99m  tetrofosmin and resting emission tomographic (SPECT) images of the  myocardium were acquired. The patient then received an intravenous  infusion of 0.4mg regadenoson (Lexiscan) followed by an additional  dose of  34.5 mCi of Tc-99m tetrofosmin. Stress phase SPECT images of  the myocardium were then acquired. These included ECG-gated images to  assess and quantify ventricular function.    FINDINGS:  Stress images demonstrate a normal distribution of perfusion  throughout all LV segments with no sign of ischemia.    ECG-gated images demonstrate normal LV size and myocardial  contractility with an LV ejection fraction of   75 % (normal above 50  percent).    Impression  1. Normal stress myocardial perfusion imaging in response to  pharmacologic stress.  2. Calculated ejection fraction is 75% without segmental wall motion  abnormality seen.    Cardiac CatheterizationNo results found for this or any previous visit from the past 37376 days.    Cardiac Scoring No results found for this or any previous visit from the past 36960 days.    AAA screenNo results found for this or any previous visit from the past 45339 days.    Carotid DopplerNo results found for this or any previous visit from the past 69868 days.    X Ray === 06/13/22 ===    CHEST 2 VIEW    - Impression -  No evidence of acute intrathoracic abnormality.  Pulmonary Function Tests  No results found for: \"FEV1\", \"FVC\", \"IMX1LVG\", \"TLC\", \"DLCO\"   OTHER: No results found for this or any previous visit from the past 1825 days.    Results from last 7 days   Lab Units 06/23/25  1148   POCT GLUCOSE mg/dL 97 " "    No results found for: \"PREGTESTUR\", \"PREGSERUM\", \"HCG\", \"HCGQUANT\"  The 10-year ASCVD risk score (Hailee STOUT, et al., 2019) is: 27.9%    Values used to calculate the score:      Age: 79 years      Sex: Female      Is Non- : No      Diabetic: No      Tobacco smoker: No      Systolic Blood Pressure: 118 mmHg      Is BP treated: Yes      HDL Cholesterol: 62.7 mg/dL      Total Cholesterol: 169 mg/dL    Code Status: Full Code                   Clinical information reviewed:    Allergies  Meds               NPO Detail:  No data recorded     Physical Exam    Airway  Mallampati: III     Cardiovascular    Dental    Pulmonary    Abdominal            Anesthesia Plan    History of general anesthesia?: yes  History of complications of general anesthesia?: no    ASA 3     general     intravenous induction   Anesthetic plan and risks discussed with patient.           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 06/27/2023    Hyperlipidemia     Hypertension     Other conditions influencing health status     Skin Abscess Of Toes    Pain in right axilla 03/27/2024    Personal history of COVID-19 06/15/2022    Personal history of other diseases of the musculoskeletal system and connective tissue     History of bursitis    Personal history of other diseases of the nervous system and sense organs     History of sciatica    Ulcer of ankle (Multi) 03/27/2024   [2]   Past Surgical History:  Procedure Laterality Date    TOTAL KNEE ARTHROPLASTY  06/27/2013    Knee Replacement    US GUIDED THYROID BIOPSY  3/10/2014    US GUIDED THYROID BIOPSY 3/10/2014 [facility]   [3]   Social History  Tobacco Use    " Smoking status: Never    Smokeless tobacco: Never   Substance Use Topics    Alcohol use: Yes    Drug use: Never   [4] No Known Allergies  [5]   Current Facility-Administered Medications:     acetaminophen (Tylenol) tablet 650 mg, 650 mg, oral, q4h PRN, 650 mg at 06/21/25 2103 **OR** acetaminophen (Tylenol) oral liquid 650 mg, 650 mg, nasogastric tube, q4h PRN **OR** acetaminophen (Tylenol) suppository 650 mg, 650 mg, rectal, q4h PRN, José Wren MD    acetaminophen (Tylenol) tablet 650 mg, 650 mg, oral, q4h PRN, 650 mg at 06/22/25 2045 **OR** acetaminophen (Tylenol) oral liquid 650 mg, 650 mg, oral, q4h PRN **OR** acetaminophen (Tylenol) suppository 650 mg, 650 mg, rectal, q4h PRN, José Wren MD    [Held by provider] apixaban (Eliquis) tablet 5 mg, 5 mg, oral, q12h HELGA, José Wren MD    atorvastatin (Lipitor) tablet 40 mg, 40 mg, oral, Daily, José Wren MD, 40 mg at 06/23/25 0843    cefTRIAXone (Rocephin) 1 g in dextrose (iso) IV 50 mL, 1 g, intravenous, q24h, José Wren MD, Stopped at 06/23/25 2117    cholecalciferol (Vitamin D-3) tablet 50 mcg, 50 mcg, oral, Daily, José Wren MD, 50 mcg at 06/23/25 0843    dilTIAZem CD (Cardizem CD) 24 hr capsule 180 mg, 180 mg, oral, BID, Ryann Davis, MANUEL-CNP, 180 mg at 06/23/25 2047    DULoxetine (Cymbalta) DR capsule 60 mg, 60 mg, oral, Daily, José Wren MD, 60 mg at 06/23/25 0843    guaiFENesin (Mucinex) 12 hr tablet 600 mg, 600 mg, oral, q12h PRN, José Wren MD    levothyroxine (Synthroid, Levoxyl) tablet 25 mcg, 25 mcg, oral, Daily before breakfast, José Wren MD, 25 mcg at 06/24/25 0631    melatonin tablet 3 mg, 3 mg, oral, Nightly PRN, José Wren MD    morphine injection 4 mg, 4 mg, intravenous, Once, Burke Henderson MD    ondansetron (Zofran) injection 4 mg, 4 mg, intravenous, Once, Burke Henderson MD    ondansetron (Zofran) tablet 4 mg, 4 mg, oral, q8h PRN **OR** ondansetron (Zofran) injection 4 mg, 4 mg, intravenous,  q8h PRN, José Wren MD    pantoprazole (ProtoNix) EC tablet 40 mg, 40 mg, oral, Daily before breakfast, 40 mg at 06/24/25 0631 **OR** pantoprazole (Protonix) injection 40 mg, 40 mg, intravenous, Daily before breakfast, José Wren MD, 40 mg at 06/23/25 0606    sennosides (Senokot) tablet 17.2 mg, 2 tablet, oral, BID, José Wren MD, 17.2 mg at 06/23/25 2047    tamsulosin (Flomax) 24 hr capsule 0.4 mg, 0.4 mg, oral, Daily, Paris Wang, APRN-CNP, 0.4 mg at 06/23/25 1741    triamcinolone (Kenalog) 0.1 % ointment 1 Application, 1 Application, Topical, BID, José Wren MD, 1 Application at 06/23/25 1987

## 2025-06-24 NOTE — ANESTHESIA PROCEDURE NOTES
Airway  Date/Time: 6/24/2025 1:42 PM  Reason: elective    Airway not difficult    Staffing  Performed: PETR   Authorized by: Wing Galvan MD    Performed by: PETR Flores  Patient location during procedure: OR    Patient Condition  Indications for airway management: anesthesia  Patient position: sniffing  Sedation level: deep     Final Airway Details   Preoxygenated: yes  Final airway type: endotracheal airway  Successful airway: ETT  Cuffed: yes   Successful intubation technique: direct laryngoscopy  Adjuncts used in placement: intubating stylet and cricoid pressure  Endotracheal tube insertion site: oral  Blade: Skip  Blade size: #3  ETT size (mm): 7.0  Cormack-Lehane Classification: grade IV - neither glottis nor epiglottis seen  Placement verified by: chest auscultation and capnometry   Measured from: teeth  ETT to teeth (cm): 22  Number of attempts at approach: 1

## 2025-06-24 NOTE — OP NOTE
Cystoscopy; Left Ureteral Stent Insertion (L) Operative Note     Date: 2025  OR Location: [facility] A OR    Name: Millie Riley, : 1945, Age: 79 y.o., MRN: [id number], Sex: female    Diagnosis  Pre-op Diagnosis      * Kidney stone on left side [N20.0] Post-op Diagnosis     * Kidney stone on left side [N20.0]     Procedures  Cystoscopy; Left Ureteral Stent Insertion  85133 - ME CYSTO W/INSERT URETERAL STENT    Surgeons      * Don Solis - Primary    Resident/Fellow/Other Assistant:  Surgeons and Role:     * Elkin Baptiste MD - Resident - Assisting    Staff:   Circulator: Jane Martell Person: Hannah    Anesthesia Staff: Anesthesiologist: Wing Galvan MD  C-AA: PETR Flores    Procedure Summary  Anesthesia: General  ASA: III  Estimated Blood Loss: 1mL  Intra-op Medications:   Administrations occurring from 1328 to 1428 on 25:   Medication Name Total Dose   sodium chloride 0.9 % irrigation solution 2,000 mL   iohexol (OMNIPaque) 300 mg iodine/mL solution 10 mL   dexAMETHasone (Decadron) 4 mg/mL IV Syringe 2 mL 10 mg   fentaNYL (Sublimaze) injection 50 mcg/mL 50 mcg   LR bolus Cannot be calculated   lidocaine PF (Xylocaine-MPF) local injection 2 % 80 mg   ondansetron (Zofran) 2 mg/mL injection 4 mg   propofol (Diprivan) injection 10 mg/mL 140 mg   rocuronium (ZeMuron) 50 mg/5 mL injection 70 mg   sugammadex (Bridion) 200 mg/2 mL injection 600 mg              Anesthesia Record               Intraprocedure I/O Totals          Intake    LR bolus 250.00 mL    Total Intake 250 mL          Specimen: No specimens collected              Drains and/or Catheters: * None in log *    Tourniquet Times:         Implants:  Implants       Type Name Action Serial No.      Implant STENT, TRIA URETHERAL, SOFT, 6 X  24 - SNA - [id number] Implanted NA            Findings: left hydronephrosis    Indications: Millie Riley is an 79 y.o. female who is having surgery for Kidney stone on left side [N20.0]. The  patient was seen in the preoperative area. The risks, benefits, complications, treatment options, non-operative alternatives, expected recovery and outcomes were discussed with the patient. The possibilities of reaction to medication, pulmonary aspiration, injury to surrounding structures, bleeding, recurrent infection, the need for additional procedures, failure to diagnose a condition, and creating a complication requiring transfusion or operation were discussed with the patient. The patient concurred with the proposed plan, giving informed consent.  The site of surgery was properly noted/marked if necessary per policy. The patient has been actively warmed in preoperative area. Preoperative antibiotics have been ordered and given within 2 hours of incision. Venous thrombosis prophylaxis have been ordered including bilateral sequential compression devices    Procedure Details:   Patient consented to procedure in preoperative area. Risks and benefits discussed. Patient was marked on the left side. Allergies were reviewed. CTX was given within 24 hrs. Patient was brought to the operating room and placed in supine position on the operating room table. A timeout was performed. All were in agreement. Patient underwent general anesthesia without complication. They were repositioned in dorsal lithotomy and prepped and draped in the usual sterile fashion. A 21 fr rigid cystoscope was used to perform cystourethroscopy. Urethra was normal. UOs were in normal orthotopic position. No masses or stones were identified.  A 5Fr ureteral catheter was placed over a wire through the left UO and retrograde pyelogram was performed.     Retrograde pyelogram interpretation: left hydronephrosis    The wire was replaced through the 5Fr catheter to the level of the kidney as confirmed on fluoroscopy. A 6x 24 JJ stent was placed over the wire. Proximal curl was noted in the kidney on fluoroscopy and distal curl was seen in the bladder under  direct visualization. Bladder was drained, instruments removed. This concluded the procedure. Patient was awoken from anesthesia without complication and transferred to PACU in stable condition.      Plan:  - Scheduled follow up with Dr. Wright on 7/21/2025 to discuss definitive stone treatment    Evidence of Infection:   Complications:  None; patient tolerated the procedure well.    Disposition: PACU - hemodynamically stable.  Condition: stable                   Attending Attestation:     Don Solis  Phone Number: [phone]

## 2025-06-24 NOTE — PROGRESS NOTES
Pharmacy Medication History     Source of Information: External fill hx and patient     Additional concerns with the patient's PTA list.   none  Notified Provider via Haiku : No - no changes made     The following updates were made to the Prior to Admission medication list:     Medications ADDED:   none  Medications CHANGED:  none  Medications REMOVED:   none  Medications NOT TAKING:   none    Allergy reviewed : No    The list below reflectives the updated PTA list. Please review each medication in order reconciliation for additional clarification and justification.    Prior to Admission Medications   Prescriptions Last Dose Informant   DULoxetine (Cymbalta) 60 mg DR capsule 6/20/2025 Other   Sig: TAKE 1 CAPSULE BY MOUTH ONCE DAILY.   apixaban (Eliquis) 5 mg tablet 6/20/2025 Other   Sig: Take 1 tablet (5 mg) by mouth every 12 hours.   ascorbic acid, vitamin C, 500 mg capsule Past Week Other   Sig: Take 1 capsule by mouth once daily.   atorvastatin (Lipitor) 40 mg tablet 6/20/2025 Other   Sig: Take 1 tablet (40 mg) by mouth once daily.   cholecalciferol (Vitamin D-3) 50 MCG (2000 UT) tablet 6/20/2025 Other   Sig: Take 1 tablet (2,000 Units) by mouth once daily.   dilTIAZem CD (Cardizem CD) 180 mg 24 hr capsule 6/20/2025 Other   Sig: Take 1 capsule (180 mg) by mouth once daily.   levothyroxine (Synthroid, Levoxyl) 25 mcg tablet 6/20/2025 Other   Sig: TAKE 1 TABLET BY MOUTH EVERY DAY   multivitamin (Daily Multi-Vitamin) tablet 6/20/2025 Other   Sig: Take 1 tablet by mouth once daily.   spironolactone (Aldactone) 25 mg tablet 6/20/2025 Other   Sig: Take 1 tablet (25 mg) by mouth once daily.   torsemide 40 mg tablet 6/20/2025 Other, Self   Sig: Take 40 mg by mouth once daily.   triamcinolone (Kenalog) 0.1 % cream Past Week Other   Sig: Apply topically 2 times a day. Apply to affected area 1-2 times daily as needed.      Facility-Administered Medications: None       The list below reflectives the updated allergy list.  Please review each documented allergy for additional clarification and justification.    No Known Allergies       06/24/25 at 10:06 AM - Blaire RamirezD

## 2025-06-24 NOTE — PROGRESS NOTES
Millie Riley is a 79 y.o. female     Flank pain persists  Surgery got postponed until today  Feels okay overall    Review of Systems     Constitutional: no fever, no chills, not feeling poorly, not feeling tired   Cardiovascular: no chest pain   Respiratory: no cough, wheezing or shortness of breath a  Gastrointestinal: Flank pain  Neurological: no headache,   All other systems have been reviewed and are negative for complaint.       Vitals:    06/24/25 1430   BP: 122/63   Pulse: 99   Resp: 16   Temp:    SpO2: 98%        Scheduled medications  Scheduled Medications[1]  Continuous medications  Continuous Medications[2]  PRN medications  PRN Medications[3]    Lab Review   Results from last 7 days   Lab Units 06/24/25  0559 06/22/25  0848 06/21/25  0548   WBC AUTO x10*3/uL 6.7 9.9 8.0   HEMOGLOBIN g/dL 12.2 13.0 13.3   HEMATOCRIT % 36.9 40.2 40.6   PLATELETS AUTO x10*3/uL 231 221 236     Results from last 7 days   Lab Units 06/24/25  0559 06/23/25  0634 06/22/25  0848 06/21/25  0548 06/20/25  1642   SODIUM mmol/L 134* 134* 136   < > 136   POTASSIUM mmol/L 5.0 4.7 4.6   < > 4.8   CHLORIDE mmol/L 101 102 103   < > 101   CO2 mmol/L 22 20* 21   < > 27   BUN mg/dL 31* 28* 28*   < > 33*   CREATININE mg/dL 1.67* 1.45* 1.44*   < > 1.41*   CALCIUM mg/dL 8.7 8.7 9.0   < > 9.1   PROTEIN TOTAL g/dL  --   --   --   --  7.6   BILIRUBIN TOTAL mg/dL  --   --   --   --  0.6   ALK PHOS U/L  --   --   --   --  94   ALT U/L  --   --   --   --  29   AST U/L  --   --   --   --  23   GLUCOSE mg/dL 120* 99 109*   < > 123*    < > = values in this interval not displayed.            FL less than 1 hour   Final Result      CT angio abdomen pelvis w and or wo IV IV contrast   Final Result   1. No evidence of active gastrointestinal hemorrhage.        2. Worsening left hydronephrosis and perinephric inflammatory fat   stranding. There is appearance of numerous new nonobstructing left   renal calculi and interval enlargement of a dominant renal  pelvic   calculus that extensive the UPJ and could be resulting in   intermittent obstruction given delayed nephrogram. However, delayed   nephrogram could also be result of globally poor renal function   secondary to the aforementioned on a chronic basis. No areas of focal   parenchymal hypoenhancement to suggest acute pyelonephritis though   correlation with clinical factors intraorally.        3. Nonobstructing subcentimeter right renal calculus.        4. Diverticulosis of the small bowel and colon without evidence of   acute diverticulitis.        MACRO:   None.        Signed by: Albaro Norman 6/20/2025 7:44 PM   Dictation workstation:   [id number]            Physical Exam    Constitutional   General appearance: Alert and in no acute distress.   Pulmonary   Respiratory assessment: No respiratory distress, normal respiratory rhythm and effort.    Auscultation of Lungs: Clear bilateral breath sounds.   Cardiovascular   Auscultation of heart: Apical pulse normal, heart rate and rhythm normal, normal S1 and S2, no murmurs and no pericardial rub.    Exam for edema: Plus edema with stasis dermatitis  Abdomen   Abdominal Exam: No bruits, normal bowel sounds, soft, non-tender, no abdominal mass palpated.    Liver and Spleen exam: No hepato-splenomegaly.   Positive left flank tenderness  Musculoskeletal     Inspection/palpation of joints, bones and muscles: No joint swelling. Normal movement of all extremities.   Neurologic   Cranial nerves: Nerves 2-12 were intact, no focal neuro defects.         Assessment/Plan        #Pyelonephritis  #Hydronephrosis with nephrolithiasis  Continue IV antibiotics pain control  Scheduled for cystoscopy with possible stent placement today    #Atrial fibrillation  Rate controlled  Holding Eliquis for impending surgery    #Chronic diastolic congestive heart failure  Stable and euvolemic  Holding medications because of kidney injury    #Acute kidney injury  Secondary to above  Holding  diuretics for now    #Hypothyroidism  #Lymphedema  Continue current medications  Recommend compression stockings       [1] [Held by provider] apixaban, 5 mg, oral, q12h HELGA  [Transfer Hold] atorvastatin, 40 mg, oral, Daily  [Transfer Hold] cefTRIAXone, 1 g, intravenous, q24h  [Transfer Hold] cholecalciferol, 50 mcg, oral, Daily  [Transfer Hold] dilTIAZem CD, 180 mg, oral, BID  [Transfer Hold] DULoxetine, 60 mg, oral, Daily  [Transfer Hold] levothyroxine, 25 mcg, oral, Daily before breakfast  lidocaine, 0.1 mL, subcutaneous, Once  [Transfer Hold] morphine, 4 mg, intravenous, Once  [Transfer Hold] ondansetron, 4 mg, intravenous, Once  oxygen, , inhalation, Continuous - 02/gases  [Transfer Hold] pantoprazole, 40 mg, oral, Daily before breakfast   Or  [Transfer Hold] pantoprazole, 40 mg, intravenous, Daily before breakfast  [Transfer Hold] sennosides, 2 tablet, oral, BID  [Transfer Hold] tamsulosin, 0.4 mg, oral, Daily  [Transfer Hold] triamcinolone, 1 Application, Topical, BID     [2]    [3] PRN medications: [Transfer Hold] acetaminophen **OR** [Transfer Hold] acetaminophen **OR** [Transfer Hold] acetaminophen, [Transfer Hold] acetaminophen **OR** [Transfer Hold] acetaminophen **OR** [Transfer Hold] acetaminophen, [Transfer Hold] guaiFENesin, HYDROmorphone, HYDROmorphone, [Transfer Hold] melatonin, meperidine, metoclopramide, [Transfer Hold] ondansetron **OR** [Transfer Hold] ondansetron, ondansetron, oxyCODONE

## 2025-06-24 NOTE — CARE PLAN
The patient's goals for the shift include      The clinical goals for the shift include Remain hemodynamically stable prior to procedure tomorrow.      Problem: Pain - Adult  Goal: Verbalizes/displays adequate comfort level or baseline comfort level  Outcome: Progressing     Problem: Chronic Conditions and Co-morbidities  Goal: Patient's chronic conditions and co-morbidity symptoms are monitored and maintained or improved  Outcome: Progressing     Problem: Fall/Injury  Goal: Not fall by end of shift  Outcome: Progressing

## 2025-06-24 NOTE — POST-PROCEDURE NOTE
Ms. Riley is a 79 year old female who is POD #0 from a cystoscopy with left ureteral stent insertion (Intraoperative Findings: Left hydronephrosis). She reports feeling well post-operatively. Denies any pain, nausea or vomiting. She has urinated since OR - reports light red tinged urine without clots. Denies any dysuria, frequency or urgency.     PE:  Constitutional: A&Ox3, calm and cooperative, NAD.  Eyes: PERRL, clear sclera.  ENMT: Moist mucous membranes.  Head/Neck: Neck supple.  Cardiovascular: Regular rate,  Respiratory/Thorax: Unlabored breathing.  Genitourinary: Voiding independently without difficulty.  Musculoskeletal: ROM intact.  Neurological: A&Ox3, No focal deficits.  Psychological: Appropriate mood and behavior.  Skin: Warm and dry.    Radiology and labs reviewed. VSS, afebrile.    Plan:   - Diet: Regular  - Rocephin, Flomax  - Continue current pain regimen   - PRN antiemetic   - DVT Proph: SCDs/ ambulate. Eliquis held.  - Bowel regimen: Senokot  - Monitor VS every 4 hours   - Labs ordered for AM   - IS every hour while awake   - Encourage ambulation / OOB as tolerated   - Monitor and record urine output  - Continue supportive care    Dispo: Pain and nausea control as needed. OOB as able. Monitor and record urine output. Patient has scheduled follow up with Dr. Wright 7/21 to discuss definitive stone treatment.    Total time spent 25 minutes, and greater than 50% of time was spent in counseling/coordination of care.

## 2025-06-25 LAB
ANION GAP SERPL CALC-SCNC: 13 MMOL/L (ref 10–20)
BACTERIA BLD CULT: NORMAL
BACTERIA BLD CULT: NORMAL
BUN SERPL-MCNC: 32 MG/DL (ref 6–23)
CALCIUM SERPL-MCNC: 8.6 MG/DL (ref 8.6–10.3)
CHLORIDE SERPL-SCNC: 102 MMOL/L (ref 98–107)
CO2 SERPL-SCNC: 24 MMOL/L (ref 21–32)
CREAT SERPL-MCNC: 1.38 MG/DL (ref 0.5–1.05)
EGFRCR SERPLBLD CKD-EPI 2021: 39 ML/MIN/1.73M*2
ERYTHROCYTE [DISTWIDTH] IN BLOOD BY AUTOMATED COUNT: 12.5 % (ref 11.5–14.5)
EST. AVERAGE GLUCOSE BLD GHB EST-MCNC: 120 MG/DL
GLUCOSE SERPL-MCNC: 164 MG/DL (ref 74–99)
HBA1C MFR BLD: 5.8 % (ref ?–5.7)
HCT VFR BLD AUTO: 37.3 % (ref 36–46)
HGB BLD-MCNC: 12.2 G/DL (ref 12–16)
MCH RBC QN AUTO: 29.4 PG (ref 26–34)
MCHC RBC AUTO-ENTMCNC: 32.7 G/DL (ref 32–36)
MCV RBC AUTO: 90 FL (ref 80–100)
NRBC BLD-RTO: 0 /100 WBCS (ref 0–0)
PLATELET # BLD AUTO: 256 X10*3/UL (ref 150–450)
POTASSIUM SERPL-SCNC: 5.2 MMOL/L (ref 3.5–5.3)
RBC # BLD AUTO: 4.15 X10*6/UL (ref 4–5.2)
SODIUM SERPL-SCNC: 134 MMOL/L (ref 136–145)
WBC # BLD AUTO: 8.3 X10*3/UL (ref 4.4–11.3)

## 2025-06-25 PROCEDURE — 1200000002 HC GENERAL ROOM WITH TELEMETRY DAILY

## 2025-06-25 PROCEDURE — 2500000001 HC RX 250 WO HCPCS SELF ADMINISTERED DRUGS (ALT 637 FOR MEDICARE OP): Performed by: NURSE PRACTITIONER

## 2025-06-25 PROCEDURE — 85027 COMPLETE CBC AUTOMATED: CPT | Performed by: NURSE PRACTITIONER

## 2025-06-25 PROCEDURE — 80048 BASIC METABOLIC PNL TOTAL CA: CPT | Performed by: NURSE PRACTITIONER

## 2025-06-25 PROCEDURE — 99232 SBSQ HOSP IP/OBS MODERATE 35: CPT | Performed by: INTERNAL MEDICINE

## 2025-06-25 PROCEDURE — 99233 SBSQ HOSP IP/OBS HIGH 50: CPT | Performed by: INTERNAL MEDICINE

## 2025-06-25 PROCEDURE — 2500000002 HC RX 250 W HCPCS SELF ADMINISTERED DRUGS (ALT 637 FOR MEDICARE OP, ALT 636 FOR OP/ED): Performed by: NURSE PRACTITIONER

## 2025-06-25 PROCEDURE — 36415 COLL VENOUS BLD VENIPUNCTURE: CPT | Performed by: NURSE PRACTITIONER

## 2025-06-25 PROCEDURE — 2500000004 HC RX 250 GENERAL PHARMACY W/ HCPCS (ALT 636 FOR OP/ED): Performed by: NURSE PRACTITIONER

## 2025-06-25 PROCEDURE — 83036 HEMOGLOBIN GLYCOSYLATED A1C: CPT | Mod: AHULAB | Performed by: NURSE PRACTITIONER

## 2025-06-25 RX ORDER — TAMSULOSIN HYDROCHLORIDE 0.4 MG/1
0.4 CAPSULE ORAL DAILY
Qty: 30 CAPSULE | Refills: 0 | Status: SHIPPED | OUTPATIENT
Start: 2025-06-25 | End: 2025-07-25

## 2025-06-25 RX ADMIN — Medication 50 MCG: at 09:06

## 2025-06-25 RX ADMIN — CEFTRIAXONE 1 G: 1 INJECTION, SOLUTION INTRAVENOUS at 20:49

## 2025-06-25 RX ADMIN — TRIAMCINOLONE ACETONIDE 1 APPLICATION: 1 OINTMENT TOPICAL at 09:07

## 2025-06-25 RX ADMIN — APIXABAN 5 MG: 5 TABLET, FILM COATED ORAL at 09:47

## 2025-06-25 RX ADMIN — LEVOTHYROXINE SODIUM 25 MCG: 0.03 TABLET ORAL at 05:56

## 2025-06-25 RX ADMIN — SENNOSIDES 17.2 MG: 8.6 TABLET, FILM COATED ORAL at 18:14

## 2025-06-25 RX ADMIN — TAMSULOSIN HYDROCHLORIDE 0.4 MG: 0.4 CAPSULE ORAL at 09:07

## 2025-06-25 RX ADMIN — DILTIAZEM HYDROCHLORIDE 180 MG: 180 CAPSULE, COATED, EXTENDED RELEASE ORAL at 20:49

## 2025-06-25 RX ADMIN — DILTIAZEM HYDROCHLORIDE 180 MG: 180 CAPSULE, COATED, EXTENDED RELEASE ORAL at 09:07

## 2025-06-25 RX ADMIN — PANTOPRAZOLE SODIUM 40 MG: 40 TABLET, DELAYED RELEASE ORAL at 05:56

## 2025-06-25 RX ADMIN — APIXABAN 5 MG: 5 TABLET, FILM COATED ORAL at 20:50

## 2025-06-25 RX ADMIN — ATORVASTATIN CALCIUM 40 MG: 40 TABLET, FILM COATED ORAL at 09:06

## 2025-06-25 RX ADMIN — TRIAMCINOLONE ACETONIDE 1 APPLICATION: 1 OINTMENT TOPICAL at 20:50

## 2025-06-25 RX ADMIN — DULOXETINE 60 MG: 30 CAPSULE, DELAYED RELEASE ORAL at 09:06

## 2025-06-25 ASSESSMENT — COGNITIVE AND FUNCTIONAL STATUS - GENERAL
MOBILITY SCORE: 24
DAILY ACTIVITIY SCORE: 24
MOBILITY SCORE: 24
DAILY ACTIVITIY SCORE: 24

## 2025-06-25 ASSESSMENT — PAIN SCALES - GENERAL
PAINLEVEL_OUTOF10: 0 - NO PAIN

## 2025-06-25 ASSESSMENT — PAIN SCALES - WONG BAKER: WONGBAKER_NUMERICALRESPONSE: NO HURT

## 2025-06-25 NOTE — PROGRESS NOTES
Millie Riley is a 79 y.o. female     Flank pain has completely resolved after the stent placement    Review of Systems     Constitutional: no fever, no chills, not feeling poorly, not feeling tired   Cardiovascular: no chest pain   Respiratory: no cough, wheezing or shortness of breath a  Gastrointestinal: Flank pain  Neurological: no headache,   All other systems have been reviewed and are negative for complaint.       Vitals:    06/25/25 1007   BP:    Pulse:    Resp:    Temp:    SpO2: 96%        Scheduled medications  Scheduled Medications[1]  Continuous medications  Continuous Medications[2]  PRN medications  PRN Medications[3]    Lab Review   Results from last 7 days   Lab Units 06/25/25  0557 06/24/25  0559 06/22/25  0848   WBC AUTO x10*3/uL 8.3 6.7 9.9   HEMOGLOBIN g/dL 12.2 12.2 13.0   HEMATOCRIT % 37.3 36.9 40.2   PLATELETS AUTO x10*3/uL 256 231 221     Results from last 7 days   Lab Units 06/25/25  0557 06/24/25  0559 06/23/25  0634 06/21/25  0548 06/20/25  1642   SODIUM mmol/L 134* 134* 134*   < > 136   POTASSIUM mmol/L 5.2 5.0 4.7   < > 4.8   CHLORIDE mmol/L 102 101 102   < > 101   CO2 mmol/L 24 22 20*   < > 27   BUN mg/dL 32* 31* 28*   < > 33*   CREATININE mg/dL 1.38* 1.67* 1.45*   < > 1.41*   CALCIUM mg/dL 8.6 8.7 8.7   < > 9.1   PROTEIN TOTAL g/dL  --   --   --   --  7.6   BILIRUBIN TOTAL mg/dL  --   --   --   --  0.6   ALK PHOS U/L  --   --   --   --  94   ALT U/L  --   --   --   --  29   AST U/L  --   --   --   --  23   GLUCOSE mg/dL 164* 120* 99   < > 123*    < > = values in this interval not displayed.            FL less than 1 hour   Final Result      CT angio abdomen pelvis w and or wo IV IV contrast   Final Result   1. No evidence of active gastrointestinal hemorrhage.        2. Worsening left hydronephrosis and perinephric inflammatory fat   stranding. There is appearance of numerous new nonobstructing left   renal calculi and interval enlargement of a dominant renal pelvic   calculus that  extensive the UPJ and could be resulting in   intermittent obstruction given delayed nephrogram. However, delayed   nephrogram could also be result of globally poor renal function   secondary to the aforementioned on a chronic basis. No areas of focal   parenchymal hypoenhancement to suggest acute pyelonephritis though   correlation with clinical factors intraorally.        3. Nonobstructing subcentimeter right renal calculus.        4. Diverticulosis of the small bowel and colon without evidence of   acute diverticulitis.        MACRO:   None.        Signed by: Albaro Norman 6/20/2025 7:44 PM   Dictation workstation:   [id number]      Transesophageal Echo ([facility]) With Possible Cardioversion    (Results Pending)         Physical Exam    Constitutional   General appearance: Alert and in no acute distress.   Pulmonary   Respiratory assessment: No respiratory distress, normal respiratory rhythm and effort.    Auscultation of Lungs: Clear bilateral breath sounds.   Cardiovascular   Auscultation of heart: Irregularly irregular  Exam for edema: Plus edema with stasis dermatitis  Abdomen   Abdominal Exam: No bruits, normal bowel sounds, soft, non-tender, no abdominal mass palpated.    Liver and Spleen exam: No hepato-splenomegaly.   Positive left flank tenderness  Musculoskeletal     Inspection/palpation of joints, bones and muscles: No joint swelling. Normal movement of all extremities.   Neurologic   Cranial nerves: Nerves 2-12 were intact, no focal neuro defects.         Assessment/Plan        #Pyelonephritis  #Hydronephrosis with nephrolithiasis  Continue IV antibiotics pain control  Scheduled for cystoscopy with possible stent placement today    #Atrial fibrillation  Rate controlled  Cardiology planning cardioversion  Resume Eliquis    #Chronic diastolic congestive heart failure  Stable and euvolemic  Holding medications because of kidney injury    #Acute kidney injury  Secondary to above  Holding diuretics for  now    #Hypothyroidism  #Lymphedema  Continue current medications  Recommend compression stockings         [1] apixaban, 5 mg, oral, q12h HELGA  atorvastatin, 40 mg, oral, Daily  cefTRIAXone, 1 g, intravenous, q24h  cholecalciferol, 50 mcg, oral, Daily  dilTIAZem CD, 180 mg, oral, BID  DULoxetine, 60 mg, oral, Daily  levothyroxine, 25 mcg, oral, Daily before breakfast  pantoprazole, 40 mg, oral, Daily before breakfast   Or  pantoprazole, 40 mg, intravenous, Daily before breakfast  sennosides, 2 tablet, oral, BID  tamsulosin, 0.4 mg, oral, Daily  triamcinolone, 1 Application, Topical, BID     [2]    [3] PRN medications: acetaminophen **OR** acetaminophen **OR** acetaminophen, acetaminophen **OR** acetaminophen **OR** acetaminophen, guaiFENesin, melatonin, ondansetron **OR** ondansetron

## 2025-06-25 NOTE — PROGRESS NOTES
06/25/25 0786   Discharge Planning   Assistance Needed POD #1 from a cystoscopy with left ureteral stent insertion (Intraoperative Findings: Left hydronephrosis), if tolerating diet and pain controlled most likely will dc home no additional needs today, will f/u outpt with urology.   Expected Discharge Disposition Home     Patient to have cardioversion tomorrow d/t afib rvr

## 2025-06-25 NOTE — CARE PLAN
The patient's goals for the shift include      The clinical goals for the shift include remain hds throughout shift      Problem: Pain  Goal: Takes deep breaths with improved pain control throughout the shift  Outcome: Progressing     Problem: Pain - Adult  Goal: Verbalizes/displays adequate comfort level or baseline comfort level  Outcome: Progressing     Problem: Safety - Adult  Goal: Free from fall injury  Outcome: Progressing

## 2025-06-25 NOTE — PROGRESS NOTES
"Millie Riley is a 79 y.o. female on day 5 of admission presenting with Pyelonephritis.    Subjective   Evaluated at bedside. She is awake, alert, sitting up comfortably in chair. States she does not have any pain today and is feeling great. Had moderate amount of blood in urine yesterday but none today. Tolerating regular diet. No N/V. No dysuria or urgency. Inquires about post-op medications and follow up, otherwise no questions or concerns.       Objective     Physical Exam  Vitals and nursing note reviewed.   Constitutional:       General: She is not in acute distress.     Appearance: She is not ill-appearing or toxic-appearing.   HENT:      Head: Normocephalic and atraumatic.      Mouth/Throat:      Mouth: Mucous membranes are moist.   Eyes:      Pupils: Pupils are equal, round, and reactive to light.      Comments: Clear sclera.   Cardiovascular:      Rate and Rhythm: Normal rate.   Pulmonary:      Effort: Pulmonary effort is normal.      Comments: No labored breathing.  Abdominal:      General: Bowel sounds are normal.      Palpations: Abdomen is soft.   Genitourinary:     Comments: Voiding independently without difficulty.  Musculoskeletal:      Cervical back: Neck supple.      Right lower leg: No edema.      Left lower leg: No edema.   Skin:     General: Skin is warm and dry.   Neurological:      General: No focal deficit present.      Mental Status: She is alert and oriented to person, place, and time.   Psychiatric:         Mood and Affect: Mood normal.         Behavior: Behavior normal. Behavior is cooperative.         Last Recorded Vitals  Blood pressure 107/60, pulse 101, temperature 36.7 °C (98 °F), temperature source Temporal, resp. rate 18, height 1.651 m (5' 5\"), weight 113 kg (250 lb), SpO2 96%.  Intake/Output last 3 Shifts:  I/O last 3 completed shifts:  In: 350 (3.1 mL/kg) [IV Piggyback:350]  Out: - (0 mL/kg)   Weight: 113.4 kg     Relevant Results    Scheduled medications  Scheduled " Medications[1]  Continuous medications  Continuous Medications[2]  PRN medications  PRN Medications[3]    Results for orders placed or performed during the hospital encounter of 06/20/25 (from the past 24 hours)   CBC   Result Value Ref Range    WBC 8.3 4.4 - 11.3 x10*3/uL    nRBC 0.0 0.0 - 0.0 /100 WBCs    RBC 4.15 4.00 - 5.20 x10*6/uL    Hemoglobin 12.2 12.0 - 16.0 g/dL    Hematocrit 37.3 36.0 - 46.0 %    MCV 90 80 - 100 fL    MCH 29.4 26.0 - 34.0 pg    MCHC 32.7 32.0 - 36.0 g/dL    RDW 12.5 11.5 - 14.5 %    Platelets 256 150 - 450 x10*3/uL   Basic Metabolic Panel   Result Value Ref Range    Glucose 164 (H) 74 - 99 mg/dL    Sodium 134 (L) 136 - 145 mmol/L    Potassium 5.2 3.5 - 5.3 mmol/L    Chloride 102 98 - 107 mmol/L    Bicarbonate 24 21 - 32 mmol/L    Anion Gap 13 10 - 20 mmol/L    Urea Nitrogen 32 (H) 6 - 23 mg/dL    Creatinine 1.38 (H) 0.50 - 1.05 mg/dL    eGFR 39 (L) >60 mL/min/1.73m*2    Calcium 8.6 8.6 - 10.3 mg/dL     FL less than 1 hour   Final Result      CT angio abdomen pelvis w and or wo IV IV contrast   Final Result   1. No evidence of active gastrointestinal hemorrhage.        2. Worsening left hydronephrosis and perinephric inflammatory fat   stranding. There is appearance of numerous new nonobstructing left   renal calculi and interval enlargement of a dominant renal pelvic   calculus that extensive the UPJ and could be resulting in   intermittent obstruction given delayed nephrogram. However, delayed   nephrogram could also be result of globally poor renal function   secondary to the aforementioned on a chronic basis. No areas of focal   parenchymal hypoenhancement to suggest acute pyelonephritis though   correlation with clinical factors intraorally.        3. Nonobstructing subcentimeter right renal calculus.        4. Diverticulosis of the small bowel and colon without evidence of   acute diverticulitis.        MACRO:   None.        Signed by: Albaro Norman 6/20/2025 7:44 PM   Dictation  workstation:   [id number]      Transesophageal Echo ([facility]) With Possible Cardioversion    (Results Pending)     Assessment & Plan  Obesity    Chronic renal insufficiency    Anticoagulant long-term use    Anxiety    Depression    Hydronephrosis    Kidney stone on left side      POD1 cystoscopy with left ureteral stent insertion (intra-op findings: left hydronephrosis). Recovering as expected. Pain well-controlled. VSS, afebrile. Labs reviewed: H/H WNL. Cr 1.38, improved from 1.67.    Plan:  - Stent to remain in place until definitive treatment  - Diet: regular   - Rocephin, Flomax  - Encourage IS  - Encourage OOB/ambulation as tolerated  - DVT prophylaxis: SCD. OK to resume Eliquis today given stable H/H  - Continue current pain control regimen  - Follow up outpatient with Dr. Wright 7/21 to discuss definitive stone treatment    Stable from urology standpoint, will sign off, available if needed, appreciate consult, ok to be discharged when medically stable.    I spent 35 minutes in the professional and overall care of this patient.      Laya Hunt PA-C         [1] apixaban, 5 mg, oral, q12h HELGA  atorvastatin, 40 mg, oral, Daily  cefTRIAXone, 1 g, intravenous, q24h  cholecalciferol, 50 mcg, oral, Daily  dilTIAZem CD, 180 mg, oral, BID  DULoxetine, 60 mg, oral, Daily  levothyroxine, 25 mcg, oral, Daily before breakfast  pantoprazole, 40 mg, oral, Daily before breakfast   Or  pantoprazole, 40 mg, intravenous, Daily before breakfast  sennosides, 2 tablet, oral, BID  tamsulosin, 0.4 mg, oral, Daily  triamcinolone, 1 Application, Topical, BID     [2]    [3] PRN medications: acetaminophen **OR** acetaminophen **OR** acetaminophen, acetaminophen **OR** acetaminophen **OR** acetaminophen, guaiFENesin, melatonin, ondansetron **OR** ondansetron

## 2025-06-25 NOTE — PROGRESS NOTES
Subjective Data:  Patient seen and examined, chart reviewed  -- Plan for cystoscopy, tolerated this well.  Notes resolution of symptoms  -- Blood pressure improved, heart rate improved however she remains in atrial fibrillation    Overnight Events:         Objective Data:  Last Recorded Vitals:  Vitals:    06/25/25 0300 06/25/25 0832 06/25/25 1007 06/25/25 1220   BP: 116/67 107/60  120/72   BP Location: Left arm Left arm  Left arm   Patient Position: Lying Lying  Lying   Pulse: 68 101  87   Resp:  18  18   Temp: 36.5 °C (97.7 °F) 36.7 °C (98 °F)  37.1 °C (98.7 °F)   TempSrc: Temporal Temporal  Temporal   SpO2: 98% 99% 96% 96%   Weight:       Height:           Last Labs:      Results from last 7 days   Lab Units 06/25/25  0557 06/24/25  0559 06/22/25  0848   WBC AUTO x10*3/uL 8.3 6.7 9.9   HEMOGLOBIN g/dL 12.2 12.2 13.0   HEMATOCRIT % 37.3 36.9 40.2   PLATELETS AUTO x10*3/uL 256 231 221     Results from last 7 days   Lab Units 06/25/25  0557 06/24/25  0559 06/23/25  0634 06/21/25  0548 06/20/25  1642   SODIUM mmol/L 134* 134* 134*   < > 136   POTASSIUM mmol/L 5.2 5.0 4.7   < > 4.8   CHLORIDE mmol/L 102 101 102   < > 101   CO2 mmol/L 24 22 20*   < > 27   BUN mg/dL 32* 31* 28*   < > 33*   CREATININE mg/dL 1.38* 1.67* 1.45*   < > 1.41*   CALCIUM mg/dL 8.6 8.7 8.7   < > 9.1   PROTEIN TOTAL g/dL  --   --   --   --  7.6   BILIRUBIN TOTAL mg/dL  --   --   --   --  0.6   ALK PHOS U/L  --   --   --   --  94   ALT U/L  --   --   --   --  29   AST U/L  --   --   --   --  23   GLUCOSE mg/dL 164* 120* 99   < > 123*    < > = values in this interval not displayed.     BNP   Date/Time Value Ref Range Status   06/13/2022 11:59 AM 26 0 - 99 pg/mL Final     Comment:     .  <100 pg/mL - Heart failure unlikely  100-299 pg/mL - Intermediate probability of acute heart  .               failure exacerbation. Correlate with clinical  .               context and patient history.    >=300 pg/mL - Heart Failure likely. Correlate with clinical  .                context and patient history.   Biotin interference may cause falsely decreased results.   Patients taking a Biotin dose of up to 5 mg/day should   refrain from taking Biotin for 24 hours before sample   collection. Providers may contact their local laboratory   for further information.       HGBA1C   Date/Time Value Ref Range Status   04/03/2024 10:53 AM 5.6 see below % Final     LDLCALC   Date/Time Value Ref Range Status   04/03/2024 10:53 AM 87 <=99 mg/dL Final     Comment:                                 Near   Borderline      AGE      Desirable  Optimal    High     High     Very High     0-19 Y     0 - 109     ---    110-129   >/= 130     ----    20-24 Y     0 - 119     ---    120-159   >/= 160     ----      >24 Y     0 -  99   100-129  130-159   160-189     >/=190       VLDL   Date/Time Value Ref Range Status   04/03/2024 10:53 AM 19 0 - 40 mg/dL Final   05/28/2022 08:41 AM 10 0 - 40 mg/dL Final   11/16/2020 09:11 AM 26 0 - 40 mg/dL Final      Last I/O:  I/O last 3 completed shifts:  In: 350 (3.1 mL/kg) [IV Piggyback:350]  Out: - (0 mL/kg)   Weight: 113.4 kg     Past Cardiology Tests (Last 3 Years):  EKG:  ECG 12 lead 06/20/2025 (Preliminary)      ECG 12 lead (Clinic Performed) 01/20/2025      ECG 12 Lead 01/02/2025      ECG 12 Lead 01/01/2025    Echo:  Transthoracic Echo (TTE) Complete 01/02/2025      Transesophageal Echo (SANTOS) With Possible Cardioversion 01/02/2025    Ejection Fractions:  EF   Date/Time Value Ref Range Status   01/02/2025 10:03 AM 58 %    01/02/2025 09:26 AM 58 %      Cath:  No results found for this or any previous visit from the past 1095 days.    Stress Test:  Nuclear Stress Test 11/04/2022    Cardiac Imaging:  No results found for this or any previous visit from the past 1095 days.      Inpatient Medications:  Scheduled Medications[1]  PRN Medications[2]  Continuous Medications[3]    Physical Exam:  Documented Vital Signs   Heart Rate:  []   Temp:  [36 °C (96.8  "°F)-37.1 °C (98.7 °F)]   Resp:  [16-20]   BP: (106-136)/(53-77)   SpO2:  [93 %-100 %]   Temp:  [36 °C (96.8 °F)-37.1 °C (98.7 °F)] 37.1 °C (98.7 °F)  Heart Rate:  [] 87  Resp:  [16-20] 18  BP: (106-136)/(53-77) 120/72      Oxygen Dose: *2 L/min    Documented Fluid Status     Intake/Output Summary (Last 24 hours) at 6/25/2025 1235  Last data filed at 6/25/2025 0832  Gross per 24 hour   Intake 540 ml   Output --   Net 540 ml     Net IO Since Admission: 2,520.67 mL [06/25/25 1235]  /72 (BP Location: Left arm, Patient Position: Lying)   Pulse 87   Temp 37.1 °C (98.7 °F) (Temporal)   Resp 18   Ht 1.651 m (5' 5\")   Wt 113 kg (250 lb)   SpO2 96%   BMI 41.60 kg/m²   General:  Patient is awake, alert, and oriented.  Patient is in no acute distress.  Seated in bedside chair.  She looks better.  HEENT:  Pupils equal and reactive.  Normocephalic.  Moist mucosa.    Neck:  No thyromegaly.  Normal Jugular Venous Pressure.  Cardiovascular:  Regular rate and rhythm.  Normal S1 and S2.  1/6 GEORGE.  Pulmonary:  Clear to auscultation bilaterally.  Abdomen:  Soft. Non-tender.   Non-distended.  Positive bowel sounds.  Lower Extremities:  2+ pedal pulses. No LE edema.  Neurologic:  Cranial nerves intact.  No focal deficit.   Skin: Skin warm and dry, normal skin turgor.   Psychiatric: Normal affect.           Assessment/Plan   Millie Riley is a 79 y.o. female with PMHx significant for HTN, HFpEF, Afib on Eliquis with hx DCCV 1/2025, hypothyroidism, lymphedema, asthma, OA  who presented to [facility]/20/25 with lower abdominal and left flank pain,  workup revealed left hydronephrosis with multiple left renal calculi, DEON, possible pyelonephritis, Afib w/ RVR. She is planned for cystoscopy, ureteroscopy tomorrow Monday 6/23/25. Cardiology consulted for \"cardiac clearance for OR tomorrow.\"         Cardiologist: Dr. Gillombardo.   Outpatient cardiac medications: Torsemide 40 mg daily, Spironolactone 25 mg daily, Diltiazem " 180 mg daily, Atorvastatin 40 mg daily, Eliquis 5 mg BID      #HFpEF, chronic   -Euvolemic on exam.   - Bumex, Spironolactone held per primary in setting of DEON.   -TTE 1/2025: LVEF 55-60%, pseudonormal diastolic dysfunction       #HTN, fair control on current regimen. Bumex, Spironolactone held per primary in setting of DEON.      #Paroxysmal Afib  -Recurrence of Afib/RVR this admit in the setting of possible pyelonephritis, left hydronephrosis with multiple left renal calculi, DEON  -ce telemetry   -HR uncontrolled, -120s.   -c/w  Diltiazem to 180 mg BID. Hold on rhythm control as OAC currently held in anticipation for cystoscopy, ureteroscopy today-> Last dose early AM 6/20/25)  -Resume Eliquis post cystoscopy, ureteroscopy per Urology  -Planned for [facility]man 7/18/25 with Dr. Hermosillo in the setting of previous hematuria & desire to get off OAC.      [id number] Stroke Risk Points: 5   Values used to calculate this score:    Points  Metrics       1        Has Congestive Heart Failure: Yes       1        Has Hypertension: Yes       2        Age: 79       0        Has Diabetes: No       0        Had Stroke: No                 Had TIA: No                 Had Thromboembolism: No       0        Has Vascular Disease: No       1        Clinically Relevant Sex: Female            Plan/Recs:   -METS>4. RCRI 1, patient is at low to intermediate risk for a moderate risk procedure. No further cardiac risk stratification is needed. There are no cardiac barriers to proceeding with surgical intervention.     -Afib rates remain uncontrolled c/w increased Diltiazem to 180 mg BID.     -Resume OAC post procedure per Urology.     - She is agreeable to proceeding with SANTOS guided cardioversion.  This is currently planned for 6/26.  N.p.o. orders are in place.    -Follow up with Dr. Gillombardo in the next 4-6 weeks as outpatient.     -Outpatient LAAO/Watchman 7/18/25 as scheduled with Dr. Hermosillo in the setting of previous  hematuria & desire to get off OAC.       Peripheral IV 06/21/25 22 G Anterior;Right Forearm (Active)   Site Assessment Clean;Dry;Intact 06/25/25 0900   Dressing Status Clean;Dry;Occlusive 06/25/25 0900   Number of days: 4       Code Status:  Full Code      Agustin Kramer DO   Division of Cardiovascular Medicine  [facility] [place]                 [1]   Scheduled medications   Medication Dose Route Frequency    apixaban  5 mg oral q12h HELGA    atorvastatin  40 mg oral Daily    cefTRIAXone  1 g intravenous q24h    cholecalciferol  50 mcg oral Daily    dilTIAZem CD  180 mg oral BID    DULoxetine  60 mg oral Daily    levothyroxine  25 mcg oral Daily before breakfast    pantoprazole  40 mg oral Daily before breakfast    Or    pantoprazole  40 mg intravenous Daily before breakfast    sennosides  2 tablet oral BID    tamsulosin  0.4 mg oral Daily    triamcinolone  1 Application Topical BID   [2]   PRN medications   Medication    acetaminophen    Or    acetaminophen    Or    acetaminophen    acetaminophen    Or    acetaminophen    Or    acetaminophen    guaiFENesin    melatonin    ondansetron    Or    ondansetron   [3]   Continuous Medications   Medication Dose Last Rate

## 2025-06-25 NOTE — CARE PLAN
The patient's goals for the shift include      The clinical goals for the shift include Remain hemodynamically stable      Problem: Safety - Adult  Goal: Free from fall injury  Outcome: Progressing     Problem: Chronic Conditions and Co-morbidities  Goal: Patient's chronic conditions and co-morbidity symptoms are monitored and maintained or improved  Outcome: Progressing     Problem: Fall/Injury  Goal: Not fall by end of shift  Outcome: Progressing

## 2025-06-26 ENCOUNTER — APPOINTMENT (OUTPATIENT)
Dept: CARDIOLOGY | Facility: HOSPITAL | Age: 80
DRG: 660 | End: 2025-06-26
Payer: MEDICARE

## 2025-06-26 ENCOUNTER — ANESTHESIA (OUTPATIENT)
Dept: CARDIOLOGY | Facility: HOSPITAL | Age: 80
DRG: 660 | End: 2025-06-26
Payer: MEDICARE

## 2025-06-26 ENCOUNTER — ANESTHESIA EVENT (OUTPATIENT)
Dept: CARDIOLOGY | Facility: HOSPITAL | Age: 80
DRG: 660 | End: 2025-06-26
Payer: MEDICARE

## 2025-06-26 LAB
ANION GAP SERPL CALC-SCNC: 13 MMOL/L (ref 10–20)
ATRIAL RATE: 394 BPM
BUN SERPL-MCNC: 36 MG/DL (ref 6–23)
CALCIUM SERPL-MCNC: 8.5 MG/DL (ref 8.6–10.3)
CHLORIDE SERPL-SCNC: 105 MMOL/L (ref 98–107)
CO2 SERPL-SCNC: 25 MMOL/L (ref 21–32)
CREAT SERPL-MCNC: 1.29 MG/DL (ref 0.5–1.05)
EGFRCR SERPLBLD CKD-EPI 2021: 42 ML/MIN/1.73M*2
ERYTHROCYTE [DISTWIDTH] IN BLOOD BY AUTOMATED COUNT: 12.6 % (ref 11.5–14.5)
GLUCOSE SERPL-MCNC: 124 MG/DL (ref 74–99)
HCT VFR BLD AUTO: 34.5 % (ref 36–46)
HGB BLD-MCNC: 11.8 G/DL (ref 12–16)
MCH RBC QN AUTO: 29.3 PG (ref 26–34)
MCHC RBC AUTO-ENTMCNC: 34.2 G/DL (ref 32–36)
MCV RBC AUTO: 86 FL (ref 80–100)
NRBC BLD-RTO: 0 /100 WBCS (ref 0–0)
PLATELET # BLD AUTO: 273 X10*3/UL (ref 150–450)
POTASSIUM SERPL-SCNC: 5 MMOL/L (ref 3.5–5.3)
Q ONSET: 228 MS
QRS COUNT: 13 BEATS
QRS DURATION: 86 MS
QT INTERVAL: 372 MS
QTC CALCULATION(BAZETT): 434 MS
QTC FREDERICIA: 412 MS
R AXIS: -21 DEGREES
RBC # BLD AUTO: 4.03 X10*6/UL (ref 4–5.2)
SODIUM SERPL-SCNC: 138 MMOL/L (ref 136–145)
T AXIS: 71 DEGREES
T OFFSET: 414 MS
VENTRICULAR RATE: 82 BPM
WBC # BLD AUTO: 9.3 X10*3/UL (ref 4.4–11.3)

## 2025-06-26 PROCEDURE — 3700000001 HC GENERAL ANESTHESIA TIME - INITIAL BASE CHARGE

## 2025-06-26 PROCEDURE — 2500000001 HC RX 250 WO HCPCS SELF ADMINISTERED DRUGS (ALT 637 FOR MEDICARE OP): Performed by: INTERNAL MEDICINE

## 2025-06-26 PROCEDURE — 3700000002 HC GENERAL ANESTHESIA TIME - EACH INCREMENTAL 1 MINUTE

## 2025-06-26 PROCEDURE — 99233 SBSQ HOSP IP/OBS HIGH 50: CPT | Performed by: INTERNAL MEDICINE

## 2025-06-26 PROCEDURE — 93010 ELECTROCARDIOGRAM REPORT: CPT | Performed by: INTERNAL MEDICINE

## 2025-06-26 PROCEDURE — 99232 SBSQ HOSP IP/OBS MODERATE 35: CPT | Performed by: INTERNAL MEDICINE

## 2025-06-26 PROCEDURE — 2500000004 HC RX 250 GENERAL PHARMACY W/ HCPCS (ALT 636 FOR OP/ED): Performed by: NURSE PRACTITIONER

## 2025-06-26 PROCEDURE — 5A2204Z RESTORATION OF CARDIAC RHYTHM, SINGLE: ICD-10-PCS | Performed by: INTERNAL MEDICINE

## 2025-06-26 PROCEDURE — 2500000004 HC RX 250 GENERAL PHARMACY W/ HCPCS (ALT 636 FOR OP/ED): Performed by: NURSE ANESTHETIST, CERTIFIED REGISTERED

## 2025-06-26 PROCEDURE — 99223 1ST HOSP IP/OBS HIGH 75: CPT | Performed by: NURSE PRACTITIONER

## 2025-06-26 PROCEDURE — 2500000005 HC RX 250 GENERAL PHARMACY W/O HCPCS: Performed by: INTERNAL MEDICINE

## 2025-06-26 PROCEDURE — 93005 ELECTROCARDIOGRAM TRACING: CPT

## 2025-06-26 PROCEDURE — 2500000002 HC RX 250 W HCPCS SELF ADMINISTERED DRUGS (ALT 637 FOR MEDICARE OP, ALT 636 FOR OP/ED): Performed by: NURSE PRACTITIONER

## 2025-06-26 PROCEDURE — 85027 COMPLETE CBC AUTOMATED: CPT | Performed by: NURSE PRACTITIONER

## 2025-06-26 PROCEDURE — 2500000001 HC RX 250 WO HCPCS SELF ADMINISTERED DRUGS (ALT 637 FOR MEDICARE OP): Performed by: NURSE PRACTITIONER

## 2025-06-26 PROCEDURE — 2500000002 HC RX 250 W HCPCS SELF ADMINISTERED DRUGS (ALT 637 FOR MEDICARE OP, ALT 636 FOR OP/ED): Performed by: INTERNAL MEDICINE

## 2025-06-26 PROCEDURE — 93320 DOPPLER ECHO COMPLETE: CPT | Performed by: INTERNAL MEDICINE

## 2025-06-26 PROCEDURE — 93325 DOPPLER ECHO COLOR FLOW MAPG: CPT | Performed by: INTERNAL MEDICINE

## 2025-06-26 PROCEDURE — 92960 CARDIOVERSION ELECTRIC EXT: CPT | Performed by: INTERNAL MEDICINE

## 2025-06-26 PROCEDURE — 93320 DOPPLER ECHO COMPLETE: CPT

## 2025-06-26 PROCEDURE — A93312 PR ECHO HEART,TRANSESOPHAGEAL,COMPLETE: Performed by: ANESTHESIOLOGY

## 2025-06-26 PROCEDURE — 99100 ANES PT EXTEME AGE<1 YR&>70: CPT | Performed by: ANESTHESIOLOGY

## 2025-06-26 PROCEDURE — 93312 ECHO TRANSESOPHAGEAL: CPT | Performed by: INTERNAL MEDICINE

## 2025-06-26 PROCEDURE — 1200000002 HC GENERAL ROOM WITH TELEMETRY DAILY

## 2025-06-26 PROCEDURE — 80048 BASIC METABOLIC PNL TOTAL CA: CPT | Performed by: NURSE PRACTITIONER

## 2025-06-26 PROCEDURE — B24BZZ4 ULTRASONOGRAPHY OF HEART WITH AORTA, TRANSESOPHAGEAL: ICD-10-PCS | Performed by: INTERNAL MEDICINE

## 2025-06-26 PROCEDURE — A93312 PR ECHO HEART,TRANSESOPHAGEAL,COMPLETE: Performed by: NURSE ANESTHETIST, CERTIFIED REGISTERED

## 2025-06-26 PROCEDURE — 36415 COLL VENOUS BLD VENIPUNCTURE: CPT | Performed by: NURSE PRACTITIONER

## 2025-06-26 RX ORDER — SODIUM CHLORIDE 9 MG/ML
100 INJECTION, SOLUTION INTRAVENOUS CONTINUOUS
Status: ACTIVE | OUTPATIENT
Start: 2025-06-26 | End: 2025-06-26

## 2025-06-26 RX ORDER — DILTIAZEM HYDROCHLORIDE 120 MG/1
120 CAPSULE, COATED, EXTENDED RELEASE ORAL 2 TIMES DAILY
Status: DISCONTINUED | OUTPATIENT
Start: 2025-06-26 | End: 2025-06-27 | Stop reason: HOSPADM

## 2025-06-26 RX ORDER — LIDOCAINE HYDROCHLORIDE 20 MG/ML
SOLUTION OROPHARYNGEAL AS NEEDED
Status: DISCONTINUED | OUTPATIENT
Start: 2025-06-26 | End: 2025-06-27 | Stop reason: HOSPADM

## 2025-06-26 RX ORDER — AMIODARONE HYDROCHLORIDE 200 MG/1
200 TABLET ORAL DAILY
Status: DISCONTINUED | OUTPATIENT
Start: 2025-07-09 | End: 2025-06-27 | Stop reason: HOSPADM

## 2025-06-26 RX ORDER — AMIODARONE HYDROCHLORIDE 200 MG/1
400 TABLET ORAL 2 TIMES DAILY
Status: DISCONTINUED | OUTPATIENT
Start: 2025-06-26 | End: 2025-06-27 | Stop reason: HOSPADM

## 2025-06-26 RX ORDER — PROPOFOL 10 MG/ML
INJECTION, EMULSION INTRAVENOUS AS NEEDED
Status: DISCONTINUED | OUTPATIENT
Start: 2025-06-26 | End: 2025-06-26

## 2025-06-26 RX ADMIN — PROPOFOL 30 MG: 10 INJECTION, EMULSION INTRAVENOUS at 14:29

## 2025-06-26 RX ADMIN — APIXABAN 5 MG: 5 TABLET, FILM COATED ORAL at 21:26

## 2025-06-26 RX ADMIN — PANTOPRAZOLE SODIUM 40 MG: 40 TABLET, DELAYED RELEASE ORAL at 06:05

## 2025-06-26 RX ADMIN — TRIAMCINOLONE ACETONIDE 1 APPLICATION: 1 OINTMENT TOPICAL at 21:27

## 2025-06-26 RX ADMIN — AMIODARONE HYDROCHLORIDE 400 MG: 200 TABLET ORAL at 21:26

## 2025-06-26 RX ADMIN — DILTIAZEM HYDROCHLORIDE 180 MG: 180 CAPSULE, COATED, EXTENDED RELEASE ORAL at 09:26

## 2025-06-26 RX ADMIN — PROPOFOL 50 MG: 10 INJECTION, EMULSION INTRAVENOUS at 14:10

## 2025-06-26 RX ADMIN — APIXABAN 5 MG: 5 TABLET, FILM COATED ORAL at 09:26

## 2025-06-26 RX ADMIN — ATORVASTATIN CALCIUM 40 MG: 40 TABLET, FILM COATED ORAL at 09:26

## 2025-06-26 RX ADMIN — TAMSULOSIN HYDROCHLORIDE 0.4 MG: 0.4 CAPSULE ORAL at 09:26

## 2025-06-26 RX ADMIN — LIDOCAINE HYDROCHLORIDE 15 ML: 20 SOLUTION ORAL; TOPICAL at 14:09

## 2025-06-26 RX ADMIN — CEFTRIAXONE 1 G: 1 INJECTION, SOLUTION INTRAVENOUS at 21:25

## 2025-06-26 RX ADMIN — PROPOFOL 20 MG: 10 INJECTION, EMULSION INTRAVENOUS at 14:16

## 2025-06-26 RX ADMIN — PROPOFOL 20 MG: 10 INJECTION, EMULSION INTRAVENOUS at 14:13

## 2025-06-26 RX ADMIN — DULOXETINE 60 MG: 30 CAPSULE, DELAYED RELEASE ORAL at 09:26

## 2025-06-26 RX ADMIN — DILTIAZEM HYDROCHLORIDE 120 MG: 120 CAPSULE, COATED, EXTENDED RELEASE ORAL at 21:26

## 2025-06-26 RX ADMIN — BENZOCAINE, BUTAMBEN, AND TETRACAINE HYDROCHLORIDE 2 SPRAY: .028; .004; .004 AEROSOL, SPRAY TOPICAL at 14:09

## 2025-06-26 RX ADMIN — SODIUM CHLORIDE 100 ML/HR: 9 INJECTION, SOLUTION INTRAVENOUS at 15:15

## 2025-06-26 RX ADMIN — SODIUM CHLORIDE: 9 INJECTION, SOLUTION INTRAVENOUS at 14:03

## 2025-06-26 RX ADMIN — SENNOSIDES 17.2 MG: 8.6 TABLET, FILM COATED ORAL at 21:26

## 2025-06-26 RX ADMIN — PROPOFOL 20 MG: 10 INJECTION, EMULSION INTRAVENOUS at 14:18

## 2025-06-26 RX ADMIN — Medication 50 MCG: at 09:26

## 2025-06-26 RX ADMIN — LEVOTHYROXINE SODIUM 25 MCG: 0.03 TABLET ORAL at 06:05

## 2025-06-26 RX ADMIN — PROPOFOL 30 MG: 10 INJECTION, EMULSION INTRAVENOUS at 14:11

## 2025-06-26 RX ADMIN — PROPOFOL 30 MG: 10 INJECTION, EMULSION INTRAVENOUS at 14:20

## 2025-06-26 SDOH — HEALTH STABILITY: MENTAL HEALTH: CURRENT SMOKER: 0

## 2025-06-26 ASSESSMENT — COGNITIVE AND FUNCTIONAL STATUS - GENERAL
MOBILITY SCORE: 24
DAILY ACTIVITIY SCORE: 24

## 2025-06-26 ASSESSMENT — ENCOUNTER SYMPTOMS
PSYCHIATRIC NEGATIVE: 1
ALLERGIC/IMMUNOLOGIC NEGATIVE: 1
ENDOCRINE NEGATIVE: 1
HEMATOLOGIC/LYMPHATIC NEGATIVE: 1
MUSCULOSKELETAL NEGATIVE: 1
EYES NEGATIVE: 1
GASTROINTESTINAL NEGATIVE: 1
NEUROLOGICAL NEGATIVE: 1
RESPIRATORY NEGATIVE: 1
CONSTITUTIONAL NEGATIVE: 1

## 2025-06-26 ASSESSMENT — PAIN SCALES - GENERAL
PAINLEVEL_OUTOF10: 0 - NO PAIN

## 2025-06-26 ASSESSMENT — PAIN - FUNCTIONAL ASSESSMENT
PAIN_FUNCTIONAL_ASSESSMENT: 0-10

## 2025-06-26 NOTE — PROGRESS NOTES
Millie Riley is a 79 y.o. female     Failed cardioversion  Started on Amio loading dose    Review of Systems     Constitutional: no fever, no chills, not feeling poorly, not feeling tired   Cardiovascular: no chest pain   Respiratory: no cough, wheezing or shortness of breath a  Gastrointestinal: Flank pain  Neurological: no headache,   All other systems have been reviewed and are negative for complaint.       Vitals:    06/26/25 1544   BP: 124/74   Pulse: 86   Resp: 16   Temp: 36.4 °C (97.6 °F)   SpO2: 94%        Scheduled medications  Scheduled Medications[1]  Continuous medications  Continuous Medications[2]  PRN medications  PRN Medications[3]    Lab Review   Results from last 7 days   Lab Units 06/26/25  0528 06/25/25  0557 06/24/25  0559   WBC AUTO x10*3/uL 9.3 8.3 6.7   HEMOGLOBIN g/dL 11.8* 12.2 12.2   HEMATOCRIT % 34.5* 37.3 36.9   PLATELETS AUTO x10*3/uL 273 256 231     Results from last 7 days   Lab Units 06/26/25  0528 06/25/25  0557 06/24/25  0559 06/21/25  0548 06/20/25  1642   SODIUM mmol/L 138 134* 134*   < > 136   POTASSIUM mmol/L 5.0 5.2 5.0   < > 4.8   CHLORIDE mmol/L 105 102 101   < > 101   CO2 mmol/L 25 24 22   < > 27   BUN mg/dL 36* 32* 31*   < > 33*   CREATININE mg/dL 1.29* 1.38* 1.67*   < > 1.41*   CALCIUM mg/dL 8.5* 8.6 8.7   < > 9.1   PROTEIN TOTAL g/dL  --   --   --   --  7.6   BILIRUBIN TOTAL mg/dL  --   --   --   --  0.6   ALK PHOS U/L  --   --   --   --  94   ALT U/L  --   --   --   --  29   AST U/L  --   --   --   --  23   GLUCOSE mg/dL 124* 164* 120*   < > 123*    < > = values in this interval not displayed.            Transesophageal Echo (SANTOS) With Possible Cardioversion         FL less than 1 hour   Final Result      CT angio abdomen pelvis w and or wo IV IV contrast   Final Result   1. No evidence of active gastrointestinal hemorrhage.        2. Worsening left hydronephrosis and perinephric inflammatory fat   stranding. There is appearance of numerous new nonobstructing left    renal calculi and interval enlargement of a dominant renal pelvic   calculus that extensive the UPJ and could be resulting in   intermittent obstruction given delayed nephrogram. However, delayed   nephrogram could also be result of globally poor renal function   secondary to the aforementioned on a chronic basis. No areas of focal   parenchymal hypoenhancement to suggest acute pyelonephritis though   correlation with clinical factors intraorally.        3. Nonobstructing subcentimeter right renal calculus.        4. Diverticulosis of the small bowel and colon without evidence of   acute diverticulitis.        MACRO:   None.        Signed by: Albaro Norman 6/20/2025 7:44 PM   Dictation workstation:   [id number]            Physical Exam    Constitutional   General appearance: Alert and in no acute distress.   Pulmonary   Respiratory assessment: No respiratory distress, normal respiratory rhythm and effort.    Auscultation of Lungs: Clear bilateral breath sounds.   Cardiovascular   Auscultation of heart: Irregularly irregular  Exam for edema: Plus edema with stasis dermatitis  Abdomen   Abdominal Exam: No bruits, normal bowel sounds, soft, non-tender, no abdominal mass palpated.    Liver and Spleen exam: No hepato-splenomegaly.   Positive left flank tenderness  Musculoskeletal     Inspection/palpation of joints, bones and muscles: No joint swelling. Normal movement of all extremities.   Neurologic   Cranial nerves: Nerves 2-12 were intact, no focal neuro defects.         Assessment/Plan        #Pyelonephritis  #Hydronephrosis with nephrolithiasis  Continue IV antibiotics pain control  Scheduled for cystoscopy with possible stent placement today    #Atrial fibrillation/flutter  Failed cardioversion  Started on oral amiodarone loading dose    #Chronic diastolic congestive heart failure  Stable and euvolemic  Holding medications because of kidney injury    #Acute kidney injury  Secondary to above  Holding  diuretics for now    #Hypothyroidism  #Lymphedema  Continue current medications  Recommend compression stockings           [1] amiodarone, 400 mg, oral, BID   Followed by  [START ON 7/9/2025] amiodarone, 200 mg, oral, Daily  apixaban, 5 mg, oral, q12h HELGA  atorvastatin, 40 mg, oral, Daily  cefTRIAXone, 1 g, intravenous, q24h  cholecalciferol, 50 mcg, oral, Daily  dilTIAZem CD, 120 mg, oral, BID  DULoxetine, 60 mg, oral, Daily  levothyroxine, 25 mcg, oral, Daily before breakfast  pantoprazole, 40 mg, oral, Daily before breakfast   Or  pantoprazole, 40 mg, intravenous, Daily before breakfast  sennosides, 2 tablet, oral, BID  tamsulosin, 0.4 mg, oral, Daily  triamcinolone, 1 Application, Topical, BID     [2]    [3] PRN medications: acetaminophen **OR** acetaminophen **OR** acetaminophen, acetaminophen **OR** acetaminophen **OR** acetaminophen, butamben-tetracaine-benzocaine, guaiFENesin, lidocaine, melatonin, ondansetron **OR** ondansetron, oxygen

## 2025-06-26 NOTE — ANESTHESIA PREPROCEDURE EVALUATION
Patient: Millie Riley    Procedure Information       Date/Time: 06/26/25 1330    Scheduled providers: Agustin Kramer DO    Procedure: TRANSESOPHAGEAL ECHO (SANTOS) W/ POSSIBLE CARDIOVERSION    Location: [facility]; [facility]            Relevant Problems   Anesthesia (within normal limits)      Cardiac   (+) Atrial fibrillation (Multi)   (+) Benign essential HTN   (+) Hyperlipidemia   (+) Murmur      Pulmonary   (+) Asthmatic bronchitis (HHS-HCC)   (+) SOB (shortness of breath) on exertion      Neuro   (+) Anxiety   (+) Depression      GI (within normal limits)      /Renal   (+) Chronic renal insufficiency   (+) Hydronephrosis   (+) Kidney stone on left side   (+) Pyelonephritis      Liver (within normal limits)      Endocrine   (+) Hypothyroidism   (+) Obesity      Hematology   (+) Anticoagulant long-term use      Musculoskeletal   (+) Fibromyalgia   (+) Osteoarthritis      HEENT (within normal limits)      ID   (+) Herpes zoster   (+) Pyelonephritis      Skin (within normal limits)      GYN (within normal limits)       Clinical information reviewed:    Allergies  Meds               NPO Detail:  NPO/Void Status  Date of Last Liquid: 06/26/25  Time of Last Liquid: 0600  Date of Last Solid: 06/22/25  Time of Last Solid: 2000         Physical Exam    Airway  Mallampati: II  TM distance: <3 FB  Neck ROM: full  Mouth opening: 3 or more finger widths     Cardiovascular - normal exam   Dental - normal exam     Pulmonary - normal exam   Abdominal - normal exam           Anesthesia Plan    History of general anesthesia?: yes  History of complications of general anesthesia?: no    ASA 3     general     The patient is not a current smoker.    intravenous induction   Anesthetic plan and risks discussed with patient.  Use of blood products discussed with patient who consented to blood products.

## 2025-06-26 NOTE — CARE PLAN
The patient's goals for the shift include  remain HD stable    The clinical goals for the shift include Patient will remain HDS this shift      Problem: Pain  Goal: Takes deep breaths with improved pain control throughout the shift  Outcome: Progressing  Goal: Turns in bed with improved pain control throughout the shift  Outcome: Progressing  Goal: Walks with improved pain control throughout the shift  Outcome: Progressing  Goal: Performs ADL's with improved pain control throughout shift  Outcome: Progressing  Goal: Participates in PT with improved pain control throughout the shift  Outcome: Progressing  Goal: Free from opioid side effects throughout the shift  Outcome: Progressing  Goal: Free from acute confusion related to pain meds throughout the shift  Outcome: Progressing     Problem: Pain - Adult  Goal: Verbalizes/displays adequate comfort level or baseline comfort level  Outcome: Progressing     Problem: Safety - Adult  Goal: Free from fall injury  Outcome: Progressing     Problem: Fall/Injury  Goal: Not fall by end of shift  Outcome: Progressing  Goal: Be free from injury by end of the shift  Outcome: Progressing  Goal: Verbalize understanding of personal risk factors for fall in the hospital  Outcome: Progressing  Goal: Verbalize understanding of risk factor reduction measures to prevent injury from fall in the home  Outcome: Progressing  Goal: Use assistive devices by end of the shift  Outcome: Progressing  Goal: Pace activities to prevent fatigue by end of the shift  Outcome: Progressing

## 2025-06-26 NOTE — ANESTHESIA POSTPROCEDURE EVALUATION
Patient: Millie Riley    Procedure Summary       Date: 06/26/25 Room / Location: [facility] [facility]    Anesthesia Start: 1403 Anesthesia Stop: 1439    Procedure: TRANSESOPHAGEAL ECHO (SANTOS) W/ POSSIBLE CARDIOVERSION Diagnosis:       Paroxysmal atrial fibrillation (Multi)      (Atrial Fibrillation with RVR)    Scheduled Providers: Agustin Kramer DO Responsible Provider: Bobby Nix MD    Anesthesia Type: general ASA Status: 3            Anesthesia Type: general          Anesthesia Post Evaluation    Patient location during evaluation: bedside  Patient participation: complete - patient participated  Level of consciousness: responsive to verbal stimuli and sedated  Pain management: adequate  Airway patency: patent  Cardiovascular status: acceptable  Respiratory status: acceptable  Hydration status: acceptable  Postoperative Nausea and Vomiting: none        No notable events documented.

## 2025-06-26 NOTE — ASSESSMENT & PLAN NOTE
[id number] Stroke Risk Points: 5   Values used to calculate this score:    Points  Metrics       1        Has Congestive Heart Failure: Yes       1        Has Hypertension: Yes       2        Age: 79       0        Has Diabetes: No       0        Had Stroke: No                 Had TIA: No                 Had Thromboembolism: No       0        Has Vascular Disease: No       1        Clinically Relevant Sex: Female  Attempted SANTOS guided Cardioversion 6/26/2025--> Unsuccessful @ 200J / 360J ; Initiate Amiodarone PO Load

## 2025-06-26 NOTE — POST-PROCEDURE NOTE
Physician Transition of Care Summary  Invasive Cardiovascular Lab    Procedure Date: 6/26/2025  Attending: Agustin Kramer DO  Resident/Fellow/Other Assistant: * No surgeons found in log *    Indications:   Atrial Fibrillation     Post-procedure diagnosis:   Atrial Fibrillation     Procedure(s):   Transesophageal Echocardiogram + Cardioversion       Procedure Findings:   No Left Atrial Appendage Thrombus   -- Unsuccessful Cardioversion     Description of the Procedure:   SANTOS and DC Electrical Cardioversion    Indication: Atrial Fibrillation     Counselling was provided to the patient including risks and benefits of the procedure. Patient provided written consent for transesophageal echocardiogram and DC Electrical Cardioversion.  Universal Protocol: a time out was performed and the correct patient and procedure were verified.     Patient was placed on continuous cardiac monitoring and continuous pulse oximetry. Supplemental oxygen was administered via nasal cannula. Electrodes were placed in an anterior/posterior fashion.    Patient received Lidocaine gargle and spray to suppress gag reflex.  IV sedation by anesthesia.  Cardiologist intubated the patient's esophagus with probe while the Sonographer operated the ultrasound machine controls.  After all images were acquired and reviewed by the Cardiologist, the esophagus was extubated.      There were no procedural complications noted.  Complete report to follow.     After appropriate level of sedation was achieved (please see separate sedation procedure note), synchronized cardioversion was performed at 200 joules with unsuccessful conversion to sinus rhythm. While patient remained sedated, a second synchronized cardioversion was performed at 360 joules with unsuccessful conversion to sinus rhythm.         Complications:   None.  Patient tolerated the procedure well.    Stents/Implants:   Implants       No implant documentation for this case.             Anticoagulation/Antiplatelet Plan:   Continue current anticoagulation     Estimated Blood Loss:   * No values recorded between 6/26/2025  1:10 PM and 6/26/2025  3:12 PM *    Anesthesia: * No anesthesia type entered * Anesthesia Staff: Anesthesiologist: Bobby Nix MD  CRNA: JUAN Huynh    Any Specimen(s) Removed:   No specimens collected during this procedure.    Disposition:   Return to floor      Electronically signed by: Agustin Kramer DO, 6/26/2025 3:12 PM

## 2025-06-26 NOTE — H&P
History Of Present Illness  Millie Riley is a 79 y.o. female presenting with atrial fibrillation, here for SANTOS with possible DCCV. PMH includes HTN, HFpEF, Afib on Eliquis with hx DCCV 1/2025, hypothyroidism, lymphedema, asthma, OA. Patient was off eliquis for cystoscopy, ureteroscopy with urology. Patient is also planned for Watchman procedure/ LAAO with Dr. Hermosillo on 7/18/25.     Past Medical History:  Medical History[1]     Past Surgical History:  Surgical History[2]       Social History:  Social History[3]    Family History:  Family History[4]     Allergies:  RX Allergies[5]     Home Medications:  Current Outpatient Medications   Medication Instructions    apixaban (ELIQUIS) 5 mg, oral, Every 12 hours    ascorbic acid, vitamin C, 500 mg capsule 1 capsule, oral, Daily RT    atorvastatin (LIPITOR) 40 mg, oral, Daily    cholecalciferol (VITAMIN D-3) 2,000 Units, oral, Daily RT    dilTIAZem CD (CARDIZEM CD) 180 mg, oral, Daily    DULoxetine (CYMBALTA) 60 mg, oral, Daily    levothyroxine (SYNTHROID, LEVOXYL) 25 mcg, oral, Daily    multivitamin (Daily Multi-Vitamin) tablet 1 tablet, oral, Daily    spironolactone (ALDACTONE) 25 mg, oral, Daily    tamsulosin (FLOMAX) 0.4 mg, oral, Daily, Do not crush, chew, or split.    torsemide 40 mg, oral, Daily    triamcinolone (Kenalog) 0.1 % cream Topical, 2 times daily, Apply to affected area 1-2 times daily as needed.       Inpatient Medications:  Scheduled Medications[6]  PRN Medications[7]  Continuous Medications[8]      Review of Systems   Constitutional: Negative.    HENT: Negative.     Eyes: Negative.    Respiratory: Negative.     Cardiovascular:  Positive for leg swelling.   Gastrointestinal: Negative.    Endocrine: Negative.    Genitourinary: Negative.    Musculoskeletal: Negative.    Skin: Negative.    Allergic/Immunologic: Negative.    Neurological: Negative.    Hematological: Negative.    Psychiatric/Behavioral: Negative.            Physical Exam  Constitutional:   "     General: She is awake. She is not in acute distress.     Appearance: She is not ill-appearing.   Cardiovascular:      Rate and Rhythm: Normal rate. Rhythm irregularly irregular.      Pulses: Normal pulses.           Radial pulses are 2+ on the right side and 2+ on the left side.        Dorsalis pedis pulses are 2+ on the right side and 2+ on the left side.      Heart sounds: Normal heart sounds. No murmur heard.     Comments: Patient reports she has lymphedema and she has been off her diuretics  Pulmonary:      Effort: Pulmonary effort is normal.      Breath sounds: Normal breath sounds and air entry.   Abdominal:      General: Bowel sounds are normal.      Palpations: Abdomen is soft.      Tenderness: There is no abdominal tenderness.   Musculoskeletal:      Right lower leg: 3+ Pitting Edema present.      Left lower leg: 3+ Pitting Edema present.   Skin:     General: Skin is warm and dry.   Neurological:      General: No focal deficit present.      Mental Status: She is alert and oriented to person, place, and time.      GCS: GCS eye subscore is 4. GCS verbal subscore is 5. GCS motor subscore is 6.   Psychiatric:         Mood and Affect: Mood normal.         Behavior: Behavior is cooperative.        Sedation Plan    ASA 3     Mallampati class: III.           NPO since last night around 1800    Last Recorded Vitals  Blood pressure 126/56, pulse 82, temperature 36.3 °C (97.4 °F), temperature source Temporal, resp. rate 16, height 1.651 m (5' 5\"), weight 113 kg (250 lb), SpO2 96%.    Oxygen Dose: *2 L/min    Vitals from the Past 24 Hours  Heart Rate:  [82]   Temp:  [35.8 °C (96.4 °F)-36.5 °C (97.7 °F)]   Resp:  [16]   BP: (105-135)/(48-61)   Height:  [165.1 cm (5' 5\")]   Weight:  [113 kg (250 lb)]   SpO2:  [93 %-98 %]     Oxygen Dose: *2 L/min    Relevant Results    Labs    POCT Glucose:   Results from last 7 days   Lab Units 06/23/25  1148 06/23/25  0715   POCT GLUCOSE mg/dL 97 104*       CBC:   Recent Labs     " "06/26/25  0528 06/25/25  0557 06/24/25  0559 06/22/25  0848 06/21/25  0548 06/20/25  1642   WBC 9.3 8.3 6.7 9.9 8.0 9.3   HGB 11.8* 12.2 12.2 13.0 13.3 14.4   HCT 34.5* 37.3 36.9 40.2 40.6 45.5    256 231 221 236 265   MCV 86 90 91 91 91 94     BMP/CMP:   Recent Labs     06/26/25  0528 06/25/25  0557 06/24/25  0559 06/23/25  0634 06/22/25  0848 06/21/25  0548 06/20/25  1642 04/16/25  1055 03/03/25  1215 01/01/25  0533 04/03/24  1053 06/05/23  1452 03/20/23  1542 05/28/22  0841 03/25/21  1545    134* 134* 134* 136 138 136   < > 140   < > 144  --  138 142 139   K 5.0 5.2 5.0 4.7 4.6 4.4 4.8   < > 4.2   < > 4.3  --  4.5 5.1 4.4    102 101 102 103 104 101   < > 101   < > 106  --  101 106 101   BUN 36* 32* 31* 28* 28* 31* 33*   < > 30*   < > 24*  --  31* 38* 22   CREATININE 1.29* 1.38* 1.67* 1.45* 1.44* 1.58* 1.41*   < > 1.09*   < > 0.90   < > 0.89 0.88 0.63   CO2 25 24 22 20* 21 22 27   < > 25   < > 28  --  26 26 28   CALCIUM 8.5* 8.6 8.7 8.7 9.0 9.0 9.1   < > 9.6   < > 9.0  --  9.7 9.2 9.4   PROT  --   --   --   --   --   --  7.6  --  7.4  --  6.9  --  7.3 7.0 7.3  7.3   BILITOT  --   --   --   --   --   --  0.6  --  0.5  --  0.4  --  0.5 0.4 0.4   ALKPHOS  --   --   --   --   --   --  94  --  92  --  96  --  91 89 102   ALT  --   --   --   --   --   --  29  --  31*  --  23  --  26 28 32   AST  --   --   --   --   --   --  23  --  22  --  20  --  22 23 23   GLUCOSE 124* 164* 120* 99 109* 105* 123*   < > 97   < > 102*  --  100* 95 98    < > = values in this interval not displayed.      Magnesium:   Recent Labs     01/02/25  0624 01/01/25  0533   MG 1.91 1.92     Lipid Panel:   Recent Labs     04/03/24  1053 05/28/22  0841 11/16/20  0911   CHOL 169 137 224*   HDL 62.7 56.4 55.9   CHHDL 2.7 2.4 4.0   VLDL 19 10 26   TRIG 97 49 132   NHDL 106  --   --      Cardiac       No lab exists for component: \"CK\", \"CKMBP\"   Hemoglobin A1C:   Recent Labs     06/25/25  0557 04/03/24  1053   HGBA1C 5.8* 5.6     TSH/ " "Free T4:   Recent Labs     06/23/25  1904 04/03/24  1053 03/20/23  1542 05/28/22  0841 11/16/20  0911 06/12/18  0924   TSH 2.08 1.91 1.95 1.64 1.48 0.91   FREET4  --   --   --  1.13  --   --      Iron:   Recent Labs     06/20/25  1642 06/13/22  1159   TIBC 392  --    IRONSAT 14*  --    BNP  --  26     Coag:   Results from last 7 days   Lab Units 06/20/25  1642   INR  1.3*     ABO: No results found for: \"ABO\"    Past Cardiology Tests (Last 3 Years):    EKG:  Recent Labs     06/20/25  1515 01/20/25  1131 01/02/25  0930 01/01/25  0859   ATRRATE  --  87 86 107   VENTRATE 101 111 86 110   PRINT  --   --  214  --    QRSDUR 80 94 96 88   QTCFRED 404 432 455 423   QTCCALCB 440 478 483 468     Encounter Date: 06/20/25   ECG 12 lead   Result Value    Ventricular Rate 101    QRS Duration 80    QT Interval 340    QTC Calculation(Bazett) 440    R Axis -56    T Axis 98    QRS Count 17    Q Onset 214    T Offset 384    QTC Fredericia 404    Narrative    Atrial fibrillation with rapid ventricular response  Left axis deviation  Abnormal QRS-T angle, consider primary T wave abnormality  Abnormal ECG  When compared with ECG of 20-JAN-2025 11:16,  QRS axis Shifted left     Echo:  Echocardiogram:   Transthoracic Echo (TTE) Complete With Contrast 01/02/2025    PHYSICIAN INTERPRETATION:  Left Ventricle: The left ventricular systolic function is normal, with a visually estimated ejection fraction of 55-60%. The left ventricular cavity size is normal. There is normal septal and normal posterior left ventricular wall thickness. Spectral Doppler shows a Grade II (pseudonormal pattern) of left ventricular diastolic filling.  Left Atrium: The left atrium was not well visualized.  Right Ventricle: The right ventricle was not well visualized. There is normal right ventricular global systolic function.  Right Atrium: The right atrium was not well visualized.  Aortic Valve: The aortic valve is probably trileaflet. There is mild to moderate aortic " valve cusp calcification. The aortic valve dimensionless index is 0.83. There is no evidence of aortic valve regurgitation. The peak instantaneous gradient of the aortic valve is 2 mmHg. The mean gradient of the aortic valve is 1 mmHg. Unable to assess the severity of aortic stenosis.  Mitral Valve: The mitral valve is mildly thickened. There is mild mitral annular calcification. There is trace to mild mitral valve regurgitation.  Tricuspid Valve: The tricuspid valve was not well visualized. Tricuspid regurgitation was not assessed.  Pulmonic Valve: The pulmonic valve is not well visualized. There is physiologic pulmonic valve regurgitation.  Pericardium: Trivial pericardial effusion.  Aorta: The aortic root is normal.  Systemic Veins: The inferior vena cava appears normal in size, with IVC inspiratory collapse greater than 50%.  In comparison to the previous echocardiogram(s): There are no prior studies on this patient for comparison purposes.      CONCLUSIONS:  1. Poorly visualized anatomical structures due to suboptimal image quality.  2. The left ventricular systolic function is normal, with a visually estimated ejection fraction of 55-60%.  3. Spectral Doppler shows a Grade II (pseudonormal pattern) of left ventricular diastolic filling.  4. There is normal right ventricular global systolic function.      Ejection Fractions:  LV EF   Date/Time Value Ref Range Status   01/02/2025 10:03 AM 58 %    01/02/2025 09:26 AM 58 %      Cath:  Coronary Angiography: No results found for this or any previous visit from the past 1800 days.    Right Heart Cath: No results found for this or any previous visit from the past 1800 days.    Stress Test:  Nuclear:  NM cardiac stress rest (myocardial perfusion MIBI) 11/04/2022    FINDINGS:  Stress images demonstrate a normal distribution of perfusion  throughout all LV segments with no sign of ischemia.    ECG-gated images demonstrate normal LV size and myocardial  contractility with  an LV ejection fraction of   75 % (normal above 50  percent).    Impression  1. Normal stress myocardial perfusion imaging in response to  pharmacologic stress.  2. Calculated ejection fraction is 75% without segmental wall motion  abnormality seen.    Metabolic Stress: No results found for this or any previous visit from the past 1800 days.    Cardiac Imaging:  Cardiac Scoring: No results found for this or any previous visit from the past 1800 days.    Cardiac MRI: No results found for this or any previous visit from the past 1800 days.         Assessment/Plan  Assessment/Plan   Assessment & Plan  Pyelonephritis    Benign essential HTN    Obesity    Hyperlipidemia    Hypothyroidism    Lymphedema    Atrial fibrillation (Multi)    Chronic renal insufficiency    Anticoagulant long-term use    Anxiety    Depression    Hydronephrosis    Kidney stone on left side    Atrial fibrillation  -SANTOS with possible DCCV with Dr. Kramer on 6/26/25       NP discussed with Dr. Kramer regarding plan of care/ discharge plan      I spent 30 minutes in the professional and overall care of this patient.      Chris Escobar, APRN-CNP, DNP         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 06/27/2023    Hyperlipidemia     Hypertension     Other conditions influencing health status     Skin Abscess Of Toes    Pain in right axilla 03/27/2024    Personal history of COVID-19 06/15/2022    Personal history of other diseases of the musculoskeletal system and connective tissue     History of bursitis    Personal history of other diseases of the nervous system and sense organs     History of sciatica    Ulcer of ankle (Multi) 03/27/2024   [2]    Past Surgical History:  Procedure Laterality Date    CYSTOSCOPY W/ URETERAL STENT PLACEMENT Left 06/24/2025    TOTAL KNEE ARTHROPLASTY  06/27/2013    Knee Replacement    US GUIDED THYROID BIOPSY  03/10/2014    US GUIDED THYROID BIOPSY 3/10/2014 [facility] ANCILLARY LEGACY   [3]   Social History  Tobacco Use    Smoking status: Never    Smokeless tobacco: Never   Substance Use Topics    Alcohol use: Yes    Drug use: Never   [4] No family history on file.  [5] No Known Allergies  [6]   Scheduled medications   Medication Dose Route Frequency    apixaban  5 mg oral q12h HELGA    atorvastatin  40 mg oral Daily    cefTRIAXone  1 g intravenous q24h    cholecalciferol  50 mcg oral Daily    dilTIAZem CD  180 mg oral BID    DULoxetine  60 mg oral Daily    levothyroxine  25 mcg oral Daily before breakfast    pantoprazole  40 mg oral Daily before breakfast    Or    pantoprazole  40 mg intravenous Daily before breakfast    sennosides  2 tablet oral BID    tamsulosin  0.4 mg oral Daily    triamcinolone  1 Application Topical BID   [7]   PRN medications   Medication    acetaminophen    Or    acetaminophen    Or    acetaminophen    acetaminophen    Or    acetaminophen    Or    acetaminophen    guaiFENesin    melatonin    ondansetron    Or    ondansetron   [8]   Continuous Medications   Medication Dose Last Rate

## 2025-06-26 NOTE — PROGRESS NOTES
Subjective Data:  Patient seen and examined, chart reviewed  -- For SANTOS / Carioversion today   -- SANTOS Tolerated well, no left atrial appendage smoke / thrombus   -- Cardioversion X 2 attempts, ( 200J / 360J ) both unsuccessful   Overnight Events:         Objective Data:  Last Recorded Vitals:  Vitals:    06/26/25 1443 06/26/25 1447 06/26/25 1500 06/26/25 1513   BP: 114/54 97/54 101/50 98/55   BP Location:   Right arm Right arm   Patient Position:   Sitting Sitting   Pulse: 84 87 84 85   Resp: 18 18 16 16   Temp:       TempSrc:       SpO2: 96% 97% 98% 98%   Weight:       Height:           Last Labs:      Results from last 7 days   Lab Units 06/26/25  0528 06/25/25  0557 06/24/25  0559   WBC AUTO x10*3/uL 9.3 8.3 6.7   HEMOGLOBIN g/dL 11.8* 12.2 12.2   HEMATOCRIT % 34.5* 37.3 36.9   PLATELETS AUTO x10*3/uL 273 256 231     Results from last 7 days   Lab Units 06/26/25  0528 06/25/25  0557 06/24/25  0559 06/21/25  0548 06/20/25  1642   SODIUM mmol/L 138 134* 134*   < > 136   POTASSIUM mmol/L 5.0 5.2 5.0   < > 4.8   CHLORIDE mmol/L 105 102 101   < > 101   CO2 mmol/L 25 24 22   < > 27   BUN mg/dL 36* 32* 31*   < > 33*   CREATININE mg/dL 1.29* 1.38* 1.67*   < > 1.41*   CALCIUM mg/dL 8.5* 8.6 8.7   < > 9.1   PROTEIN TOTAL g/dL  --   --   --   --  7.6   BILIRUBIN TOTAL mg/dL  --   --   --   --  0.6   ALK PHOS U/L  --   --   --   --  94   ALT U/L  --   --   --   --  29   AST U/L  --   --   --   --  23   GLUCOSE mg/dL 124* 164* 120*   < > 123*    < > = values in this interval not displayed.     BNP   Date/Time Value Ref Range Status   06/13/2022 11:59 AM 26 0 - 99 pg/mL Final     Comment:     .  <100 pg/mL - Heart failure unlikely  100-299 pg/mL - Intermediate probability of acute heart  .               failure exacerbation. Correlate with clinical  .               context and patient history.    >=300 pg/mL - Heart Failure likely. Correlate with clinical  .               context and patient history.   Biotin interference may  cause falsely decreased results.   Patients taking a Biotin dose of up to 5 mg/day should   refrain from taking Biotin for 24 hours before sample   collection. Providers may contact their local laboratory   for further information.       HGBA1C   Date/Time Value Ref Range Status   06/25/2025 05:57 AM 5.8 See comment % Final   04/03/2024 10:53 AM 5.6 see below % Final     LDLCALC   Date/Time Value Ref Range Status   04/03/2024 10:53 AM 87 <=99 mg/dL Final     Comment:                                 Near   Borderline      AGE      Desirable  Optimal    High     High     Very High     0-19 Y     0 - 109     ---    110-129   >/= 130     ----    20-24 Y     0 - 119     ---    120-159   >/= 160     ----      >24 Y     0 -  99   100-129  130-159   160-189     >/=190       VLDL   Date/Time Value Ref Range Status   04/03/2024 10:53 AM 19 0 - 40 mg/dL Final   05/28/2022 08:41 AM 10 0 - 40 mg/dL Final   11/16/2020 09:11 AM 26 0 - 40 mg/dL Final      Last I/O:  I/O last 3 completed shifts:  In: 290 (2.6 mL/kg) [P.O.:240; IV Piggyback:50]  Out: - (0 mL/kg)   Weight: 113.4 kg     Past Cardiology Tests (Last 3 Years):  EKG:  ECG 12 lead 06/20/2025 (Preliminary)      ECG 12 lead (Clinic Performed) 01/20/2025      ECG 12 Lead 01/02/2025      ECG 12 Lead 01/01/2025    Echo:  Transthoracic Echo (TTE) Complete 01/02/2025      Transesophageal Echo (SANTOS) With Possible Cardioversion 01/02/2025    Ejection Fractions:  EF   Date/Time Value Ref Range Status   01/02/2025 10:03 AM 58 %    01/02/2025 09:26 AM 58 %      Cath:  No results found for this or any previous visit from the past 1095 days.    Stress Test:  Nuclear Stress Test 11/04/2022    Cardiac Imaging:  No results found for this or any previous visit from the past 1095 days.      Inpatient Medications:  Scheduled Medications[1]  PRN Medications[2]  Continuous Medications[3]    Physical Exam:  Documented Vital Signs   Heart Rate:  [82-87]   Temp:  [35.8 °C (96.4 °F)-36.5 °C (97.7  "°F)]   Resp:  [16-18]   BP: ()/(48-61)   Height:  [165.1 cm (5' 5\")]   Weight:  [113 kg (250 lb)]   SpO2:  [93 %-98 %]   Temp:  [35.8 °C (96.4 °F)-36.5 °C (97.7 °F)] 36.3 °C (97.4 °F)  Heart Rate:  [82-87] 85  Resp:  [16-18] 16  BP: ()/(48-61) 98/55      Oxygen Dose: *2 L/min    Documented Fluid Status     Intake/Output Summary (Last 24 hours) at 6/26/2025 1521  Last data filed at 6/26/2025 1435  Gross per 24 hour   Intake 100 ml   Output --   Net 100 ml     Net IO Since Admission: 2,620.67 mL [06/26/25 1521]  BP 98/55 (BP Location: Right arm, Patient Position: Sitting)   Pulse 85   Temp 36.3 °C (97.4 °F) (Temporal)   Resp 16   Ht 1.651 m (5' 5\")   Wt 113 kg (250 lb)   SpO2 98%   BMI 41.60 kg/m²   General:  Patient is awake, alert, and oriented.  Patient is in no acute distress.    HEENT:  Pupils equal and reactive.  Normocephalic.  Moist mucosa.    Neck:  No thyromegaly.  Normal Jugular Venous Pressure.  Cardiovascular:  Regular rate and rhythm.  Normal S1 and S2.  1/6 GEORGE.  Pulmonary:  Clear to auscultation bilaterally.  Abdomen:  Soft. Non-tender.   Non-distended.  Positive bowel sounds.  Lower Extremities:  2+ pedal pulses. No LE edema.  Neurologic:  Cranial nerves intact.  No focal deficit.   Skin: Skin warm and dry, normal skin turgor.   Psychiatric: Normal affect.           Assessment/Plan   Millie Riley is a 79 y.o. female with PMHx significant for HTN, HFpEF, Afib on Eliquis with hx DCCV 1/2025, hypothyroidism, lymphedema, asthma, OA  who [facility] with lower abdominal and left flank pain,  workup revealed left hydronephrosis with multiple left renal calculi, DEON, possible pyelonephritis, Afib w/ RVR. She is planned for cystoscopy, ureteroscopy tomorrow Monday 6/23/25. Cardiology consulted for \"cardiac clearance for OR tomorrow.\"         Cardiologist: Dr. Gillombardo.   Outpatient cardiac medications: Torsemide 40 mg daily, Spironolactone 25 mg daily, Diltiazem " 180 mg daily, Atorvastatin 40 mg daily, Eliquis 5 mg BID      #HFpEF, chronic   -Euvolemic on exam.   - Bumex, Spironolactone held per primary in setting of DEON.   -TTE 1/2025: LVEF 55-60%, pseudonormal diastolic dysfunction       #HTN, fair control on current regimen. Bumex, Spironolactone held per primary in setting of DEON.      #Paroxysmal Afib  -Recurrence of Afib/RVR this admit in the setting of possible pyelonephritis, left hydronephrosis with multiple left renal calculi, DEON  -ce telemetry   -HR uncontrolled, -120s.   -c/w  Diltiazem to 180 mg BID. Hold on rhythm control as OAC currently held in anticipation for cystoscopy, ureteroscopy today-> Last dose early AM 6/20/25)  -Resume Eliquis post cystoscopy, ureteroscopy per Urology  -Planned for LAAO/Watchman 7/18/25 with Dr. Hermosillo in the setting of previous hematuria & desire to get off OAC.      [id number] Stroke Risk Points: 5   Values used to calculate this score:    Points  Metrics       1        Has Congestive Heart Failure: Yes       1        Has Hypertension: Yes       2        Age: 79       0        Has Diabetes: No       0        Had Stroke: No                 Had TIA: No                 Had Thromboembolism: No       0        Has Vascular Disease: No       1        Clinically Relevant Sex: Female            Plan/Recs:     -- SANTOS Guided Cardioversion unsuccessful.    -- Will reduce diltiazem to 120 mg PO BID + load amiodarone  ++ Diltiazem has been reduced  ++ Po Amiodarone Load Ordered, 400 mg PO BID X 25 doses, followed by 200 mg PO Daily thereafter until seen by Cardiology.      -- Continue on Oral anticoagulation at this time with further recommendations as per Structural heart as she gets close to Watchman date.     -Follow up with Dr. Gillombardo in the next 4-6 weeks as outpatient.     -Outpatient LAAO/Watchman 7/18/25 as scheduled with Dr. Hermosillo in the setting of previous hematuria & desire to get off OAC.     - NO further Cardiac Work  up is currently planned. No Cardiac barriers to discharge.       Peripheral IV 06/21/25 22 G Anterior;Right Forearm (Active)   Site Assessment Clean;Dry;Intact 06/25/25 0900   Dressing Status Clean;Dry;Occlusive 06/25/25 0900   Number of days: 4       Code Status:  Full Code      Agustinbrie Kramer DO   Division of Cardiovascular Medicine  [facility] [place]                 [1]   Scheduled medications   Medication Dose Route Frequency    amiodarone  400 mg oral BID    Followed by    [START ON 7/9/2025] amiodarone  200 mg oral Daily    apixaban  5 mg oral q12h HELGA    atorvastatin  40 mg oral Daily    cefTRIAXone  1 g intravenous q24h    cholecalciferol  50 mcg oral Daily    dilTIAZem CD  180 mg oral BID    DULoxetine  60 mg oral Daily    levothyroxine  25 mcg oral Daily before breakfast    pantoprazole  40 mg oral Daily before breakfast    Or    pantoprazole  40 mg intravenous Daily before breakfast    sennosides  2 tablet oral BID    tamsulosin  0.4 mg oral Daily    triamcinolone  1 Application Topical BID   [2]   PRN medications   Medication    acetaminophen    Or    acetaminophen    Or    acetaminophen    acetaminophen    Or    acetaminophen    Or    acetaminophen    butamben-tetracaine-benzocaine    guaiFENesin    lidocaine    melatonin    ondansetron    Or    ondansetron    oxygen   [3]   Continuous Medications   Medication Dose Last Rate    sodium chloride 0.9%  100 mL/hr

## 2025-06-27 ENCOUNTER — PHARMACY VISIT (OUTPATIENT)
Dept: PHARMACY | Facility: CLINIC | Age: 80
End: 2025-06-27
Payer: COMMERCIAL

## 2025-06-27 VITALS
HEART RATE: 75 BPM | TEMPERATURE: 97.7 F | OXYGEN SATURATION: 97 % | SYSTOLIC BLOOD PRESSURE: 105 MMHG | WEIGHT: 250 LBS | RESPIRATION RATE: 15 BRPM | DIASTOLIC BLOOD PRESSURE: 53 MMHG | HEIGHT: 65 IN | BODY MASS INDEX: 41.65 KG/M2

## 2025-06-27 LAB
ANION GAP SERPL CALC-SCNC: 15 MMOL/L (ref 10–20)
BUN SERPL-MCNC: 32 MG/DL (ref 6–23)
CALCIUM SERPL-MCNC: 8.5 MG/DL (ref 8.6–10.3)
CHLORIDE SERPL-SCNC: 104 MMOL/L (ref 98–107)
CO2 SERPL-SCNC: 23 MMOL/L (ref 21–32)
CREAT SERPL-MCNC: 1.13 MG/DL (ref 0.5–1.05)
EGFRCR SERPLBLD CKD-EPI 2021: 50 ML/MIN/1.73M*2
EJECTION FRACTION: 53 %
ERYTHROCYTE [DISTWIDTH] IN BLOOD BY AUTOMATED COUNT: 13.2 % (ref 11.5–14.5)
GLUCOSE SERPL-MCNC: 104 MG/DL (ref 74–99)
HCT VFR BLD AUTO: 33.3 % (ref 36–46)
HGB BLD-MCNC: 11.2 G/DL (ref 12–16)
MCH RBC QN AUTO: 29.6 PG (ref 26–34)
MCHC RBC AUTO-ENTMCNC: 33.6 G/DL (ref 32–36)
MCV RBC AUTO: 88 FL (ref 80–100)
NRBC BLD-RTO: 0 /100 WBCS (ref 0–0)
PLATELET # BLD AUTO: 288 X10*3/UL (ref 150–450)
POTASSIUM SERPL-SCNC: 4.3 MMOL/L (ref 3.5–5.3)
RBC # BLD AUTO: 3.79 X10*6/UL (ref 4–5.2)
SODIUM SERPL-SCNC: 138 MMOL/L (ref 136–145)
WBC # BLD AUTO: 6.8 X10*3/UL (ref 4.4–11.3)

## 2025-06-27 PROCEDURE — RXMED WILLOW AMBULATORY MEDICATION CHARGE

## 2025-06-27 PROCEDURE — 36415 COLL VENOUS BLD VENIPUNCTURE: CPT | Performed by: NURSE PRACTITIONER

## 2025-06-27 PROCEDURE — 2500000001 HC RX 250 WO HCPCS SELF ADMINISTERED DRUGS (ALT 637 FOR MEDICARE OP): Performed by: INTERNAL MEDICINE

## 2025-06-27 PROCEDURE — 2500000001 HC RX 250 WO HCPCS SELF ADMINISTERED DRUGS (ALT 637 FOR MEDICARE OP): Performed by: NURSE PRACTITIONER

## 2025-06-27 PROCEDURE — 99239 HOSP IP/OBS DSCHRG MGMT >30: CPT | Performed by: INTERNAL MEDICINE

## 2025-06-27 PROCEDURE — 80048 BASIC METABOLIC PNL TOTAL CA: CPT | Performed by: NURSE PRACTITIONER

## 2025-06-27 PROCEDURE — 2500000002 HC RX 250 W HCPCS SELF ADMINISTERED DRUGS (ALT 637 FOR MEDICARE OP, ALT 636 FOR OP/ED): Performed by: INTERNAL MEDICINE

## 2025-06-27 PROCEDURE — 85027 COMPLETE CBC AUTOMATED: CPT | Performed by: NURSE PRACTITIONER

## 2025-06-27 PROCEDURE — 2500000002 HC RX 250 W HCPCS SELF ADMINISTERED DRUGS (ALT 637 FOR MEDICARE OP, ALT 636 FOR OP/ED): Performed by: NURSE PRACTITIONER

## 2025-06-27 RX ORDER — DILTIAZEM HYDROCHLORIDE 120 MG/1
120 CAPSULE, COATED, EXTENDED RELEASE ORAL 2 TIMES DAILY
Qty: 60 CAPSULE | Refills: 0 | Status: SHIPPED | OUTPATIENT
Start: 2025-06-27

## 2025-06-27 RX ORDER — AMIODARONE HYDROCHLORIDE 200 MG/1
TABLET ORAL
Qty: 78 TABLET | Refills: 0 | Status: SHIPPED | OUTPATIENT
Start: 2025-06-27 | End: 2025-08-08

## 2025-06-27 RX ORDER — DILTIAZEM HYDROCHLORIDE 120 MG/1
120 CAPSULE, EXTENDED RELEASE ORAL 2 TIMES DAILY
Qty: 60 CAPSULE | Refills: 0 | Status: SHIPPED | OUTPATIENT
Start: 2025-06-27 | End: 2025-06-27

## 2025-06-27 RX ORDER — AMIODARONE HYDROCHLORIDE 200 MG/1
TABLET ORAL
Qty: 82 TABLET | Refills: 0 | Status: SHIPPED | OUTPATIENT
Start: 2025-06-27 | End: 2025-06-27

## 2025-06-27 RX ADMIN — AMIODARONE HYDROCHLORIDE 400 MG: 200 TABLET ORAL at 08:54

## 2025-06-27 RX ADMIN — PANTOPRAZOLE SODIUM 40 MG: 40 TABLET, DELAYED RELEASE ORAL at 06:26

## 2025-06-27 RX ADMIN — DULOXETINE 60 MG: 30 CAPSULE, DELAYED RELEASE ORAL at 08:55

## 2025-06-27 RX ADMIN — TAMSULOSIN HYDROCHLORIDE 0.4 MG: 0.4 CAPSULE ORAL at 08:55

## 2025-06-27 RX ADMIN — TRIAMCINOLONE ACETONIDE 1 APPLICATION: 1 OINTMENT TOPICAL at 08:55

## 2025-06-27 RX ADMIN — DILTIAZEM HYDROCHLORIDE 120 MG: 120 CAPSULE, COATED, EXTENDED RELEASE ORAL at 08:55

## 2025-06-27 RX ADMIN — LEVOTHYROXINE SODIUM 25 MCG: 0.03 TABLET ORAL at 06:26

## 2025-06-27 RX ADMIN — ATORVASTATIN CALCIUM 40 MG: 40 TABLET, FILM COATED ORAL at 08:54

## 2025-06-27 RX ADMIN — APIXABAN 5 MG: 5 TABLET, FILM COATED ORAL at 08:55

## 2025-06-27 RX ADMIN — Medication 50 MCG: at 08:55

## 2025-06-27 ASSESSMENT — PAIN SCALES - GENERAL: PAINLEVEL_OUTOF10: 0 - NO PAIN

## 2025-06-27 NOTE — PROGRESS NOTES
06/27/25 0825   Discharge Planning   Assistance Needed Patient has cysto on 6/24, then cardioversion on 6/26- failed cardioversion- cards adjusting meds, plan remains home at discharge alone.   Expected Discharge Disposition Home

## 2025-06-27 NOTE — CARE PLAN
The patient's goals for the shift include  remain free from injury    The clinical goals for the shift include patient will remain safe and stable throughout shift      Problem: Pain  Goal: Takes deep breaths with improved pain control throughout the shift  Outcome: Progressing  Goal: Turns in bed with improved pain control throughout the shift  Outcome: Progressing  Goal: Walks with improved pain control throughout the shift  Outcome: Progressing  Goal: Performs ADL's with improved pain control throughout shift  Outcome: Progressing  Goal: Participates in PT with improved pain control throughout the shift  Outcome: Progressing  Goal: Free from opioid side effects throughout the shift  Outcome: Progressing  Goal: Free from acute confusion related to pain meds throughout the shift  Outcome: Progressing     Problem: Pain - Adult  Goal: Verbalizes/displays adequate comfort level or baseline comfort level  Outcome: Progressing     Problem: Safety - Adult  Goal: Free from fall injury  Outcome: Progressing     Problem: Discharge Planning  Goal: Discharge to home or other facility with appropriate resources  Outcome: Progressing     Problem: Chronic Conditions and Co-morbidities  Goal: Patient's chronic conditions and co-morbidity symptoms are monitored and maintained or improved  Outcome: Progressing     Problem: Nutrition  Goal: Nutrient intake appropriate for maintaining nutritional needs  Outcome: Progressing     Problem: Fall/Injury  Goal: Not fall by end of shift  Outcome: Progressing  Goal: Be free from injury by end of the shift  Outcome: Progressing  Goal: Verbalize understanding of personal risk factors for fall in the hospital  Outcome: Progressing  Goal: Verbalize understanding of risk factor reduction measures to prevent injury from fall in the home  Outcome: Progressing  Goal: Use assistive devices by end of the shift  Outcome: Progressing  Goal: Pace activities to prevent fatigue by end of the  shift  Outcome: Progressing

## 2025-06-27 NOTE — DISCHARGE SUMMARY
Discharge Diagnosis  Pyelonephritis           Issues Requiring Follow-Up  Follow-up with cardiology  Holding diuretics until blood pressure stabilizes  Follow-up with PCP  Once blood pressures have improved can resume diuretics    Discharge Meds     Medication List      PAUSE taking these medications     spironolactone 25 mg tablet; Wait to take this until your doctor or   other care provider tells you to start again.; Commonly known as:   Aldactone; Take 1 tablet (25 mg) by mouth once daily.   torsemide 40 mg tablet; Wait to take this until your doctor or other   care provider tells you to start again.; Take 40 mg by mouth once daily.     START taking these medications     amiodarone 200 mg tablet; Commonly known as: Pacerone; Take 2 tablets   (400 mg) by mouth 2 times a day for 12 days, THEN 1 tablet (200 mg) once   daily.; Start taking on: June 27, 2025   tamsulosin 0.4 mg 24 hr capsule; Commonly known as: Flomax; Take 1   capsule (0.4 mg) by mouth once daily. Do not crush, chew, or split.     CHANGE how you take these medications     dilTIAZem  mg 24 hr capsule; Commonly known as: Cardizem CD; Take   1 capsule (120 mg) by mouth 2 times a day.; What changed: medication   strength, how much to take, when to take this     CONTINUE taking these medications     apixaban 5 mg tablet; Commonly known as: Eliquis; Take 1 tablet (5 mg)   by mouth every 12 hours.   ascorbic acid (vitamin C) 500 mg capsule   atorvastatin 40 mg tablet; Commonly known as: Lipitor; Take 1 tablet (40   mg) by mouth once daily.   cholecalciferol 50 mcg (2,000 units) tablet; Commonly known as: Vitamin   D-3   Daily Multi-Vitamin tablet; Generic drug: multivitamin   DULoxetine 60 mg DR capsule; Commonly known as: Cymbalta; TAKE 1 CAPSULE   BY MOUTH ONCE DAILY.   levothyroxine 25 mcg tablet; Commonly known as: Synthroid, Levoxyl; TAKE   1 TABLET BY MOUTH EVERY DAY   triamcinolone 0.1 % cream; Commonly known as: Kenalog; Apply topically 2    times a day. Apply to affected area 1-2 times daily as needed.       Test Results Pending At Discharge  Pending Labs       Order Current Status    Extra Urine Felix Tube Collected (06/23/25 0940)    Urinalysis with Reflex Culture and Microscopic In process            Hospital Course  Patient with a past medical history significant for atrial fibrillation on Eliquis hypertension chronic diastolic congestive heart failure dyslipidemia hypothyroidism asthma and osteoarthritis presented to the hospital with sudden onset of left flank pain  Workup showed pyelonephritis/kidney stone/hydronephrosis  Started antibiotics for the infection  Urology was consulted and she underwent accessible stent placement with rapid resolution of pain  Patient remained in A-fib during the stay in the hospital and a DCCV was attempted  Unfortunately the cardioversion did not work  She has been started on amiodarone instead  On the day of discharge the patient was normal sinus rhythm    Her blood pressures are on the lower side so we held the patient spironolactone and torsemide  She will follow with her primary care doctor in a week and if the blood pressures are better she can resume the diuretics  She will also follow with cardiology for planned watchman's procedure versus another cardioversion attempt    Pertinent Physical Exam At Time of Discharge  Physical Exam    Constitutional   General appearance: Alert and in no acute distress.     Pulmonary   Respiratory assessment: No respiratory distress, normal respiratory rhythm and effort.    Auscultation of Lungs: Clear bilateral breath sounds.   Cardiovascular   Auscultation of heart: Apical pulse normal, heart rate and rhythm normal, normal S1 and S2, no murmurs and no pericardial rub.    Exam for edema: edema  Abdomen   Abdominal Exam: No bruits, normal bowel sounds, soft, non-tender, no abdominal mass palpated.    Liver and Spleen exam: No hepato-splenomegaly.   Musculoskeletal    Examination of gait: Normal.    Inspection of digits and nails: No clubbing or cyanosis of the fingernails.    Inspection/palpation of joints, bones and muscles: No joint swelling. Normal movement of all extremities.   Skin   Skin inspection: Normal skin color and pigmentation, normal skin turgor and no visible rash.   Neurologic   Cranial nerves: Nerves 2-12 were intact, no focal neuro defects.    Outpatient Follow-Up  Future Appointments   Date Time Provider Department Center   7/18/2025 12:00 PM [facility] CT 1 [facility]   7/21/2025 10:45 AM Dilshad Wright MD [facility]   8/1/2025  3:30 PM Carl B Gillombardo, MD UCR1 [facility]   10/17/2025 10:30 AM [facility] CT 1 [facility] Rad     Patient seen at bedside. Events from the last visit reviewed. Discussed with staff. Results of tests and investigations from last visit reviewed and discussed with patient/Family. Electronic chart on [facility] reviewed. Input / Recommendations  from consultants  appreciated and reviewed and agreed with.     discharge summary and profile completed. medications reviewed and discussed with patient and family.  scripts completed and signed.     total discharge time in excess of 30 minutes.      Aliyah Xavier MD

## 2025-06-27 NOTE — CARE PLAN
Problem: Pain  Goal: Takes deep breaths with improved pain control throughout the shift  6/27/2025 0315 by Derrell Chan RN  Outcome: Progressing  6/27/2025 0313 by Derrell Chan RN  Outcome: Progressing  Goal: Turns in bed with improved pain control throughout the shift  6/27/2025 0315 by Derrell Chan RN  Outcome: Progressing  6/27/2025 0313 by Derrell Chan RN  Outcome: Progressing  Goal: Walks with improved pain control throughout the shift  6/27/2025 0315 by Derrell Chan RN  Outcome: Progressing  6/27/2025 0313 by Derrell Chan RN  Outcome: Progressing  Goal: Performs ADL's with improved pain control throughout shift  6/27/2025 0315 by Derrell Chan RN  Outcome: Progressing  6/27/2025 0313 by Derrell Chan RN  Outcome: Progressing  Goal: Participates in PT with improved pain control throughout the shift  6/27/2025 0315 by Derrell Chan RN  Outcome: Progressing  6/27/2025 0313 by Derrell Chan RN  Outcome: Progressing  Goal: Free from opioid side effects throughout the shift  6/27/2025 0315 by Derrell Chan RN  Outcome: Progressing  6/27/2025 0313 by Derrell Chna RN  Outcome: Progressing  Goal: Free from acute confusion related to pain meds throughout the shift  6/27/2025 0315 by Derrell Chan RN  Outcome: Progressing  6/27/2025 0313 by Derrell Chan RN  Outcome: Progressing     Problem: Pain - Adult  Goal: Verbalizes/displays adequate comfort level or baseline comfort level  6/27/2025 0315 by Derrell Chan RN  Outcome: Progressing  Flowsheets (Taken 6/27/2025 0315)  Verbalizes/displays adequate comfort level or baseline comfort level:   Encourage patient to monitor pain and request assistance   Assess pain using appropriate pain scale   Administer analgesics based on type and severity of pain and evaluate response   Implement non-pharmacological measures as appropriate and evaluate response   Consider cultural and  social influences on pain and pain management   Notify Licensed Independent Practitioner if interventions unsuccessful or patient reports new pain  6/27/2025 0313 by Derrell Chan RN  Outcome: Progressing     Problem: Safety - Adult  Goal: Free from fall injury  6/27/2025 0315 by Derrell Chan RN  Outcome: Progressing  6/27/2025 0313 by Derrell Chan RN  Outcome: Progressing     Problem: Discharge Planning  Goal: Discharge to home or other facility with appropriate resources  6/27/2025 0315 by Derrell Chan RN  Outcome: Progressing  Flowsheets (Taken 6/27/2025 0315)  Discharge to home or other facility with appropriate resources:   Identify barriers to discharge with patient and caregiver   Arrange for needed discharge resources and transportation as appropriate   Identify discharge learning needs (meds, wound care, etc)   Arrange for interpreters to assist at discharge as needed   Refer to discharge planning if patient needs post-hospital services based on physician order or complex needs related to functional status, cognitive ability or social support system  6/27/2025 0313 by Derrell Chan RN  Outcome: Progressing     Problem: Chronic Conditions and Co-morbidities  Goal: Patient's chronic conditions and co-morbidity symptoms are monitored and maintained or improved  6/27/2025 0315 by Derrell Chan RN  Outcome: Progressing  Flowsheets (Taken 6/27/2025 0315)  Care Plan - Patient's Chronic Conditions and Co-Morbidity Symptoms are Monitored and Maintained or Improved:   Monitor and assess patient's chronic conditions and comorbid symptoms for stability, deterioration, or improvement   Collaborate with multidisciplinary team to address chronic and comorbid conditions and prevent exacerbation or deterioration   Update acute care plan with appropriate goals if chronic or comorbid symptoms are exacerbated and prevent overall improvement and discharge  6/27/2025 0313 by Derrell  MAGI Chan  Outcome: Progressing     Problem: Nutrition  Goal: Nutrient intake appropriate for maintaining nutritional needs  6/27/2025 0315 by Derrell Chan RN  Outcome: Progressing  6/27/2025 0313 by Derrell Chan RN  Outcome: Progressing     Problem: Fall/Injury  Goal: Not fall by end of shift  6/27/2025 0315 by Derrell Chan RN  Outcome: Progressing  6/27/2025 0313 by Derrell Chan RN  Outcome: Progressing  Goal: Be free from injury by end of the shift  6/27/2025 0315 by Derrell Chan RN  Outcome: Progressing  6/27/2025 0313 by Derrell Chan RN  Outcome: Progressing  Goal: Verbalize understanding of personal risk factors for fall in the hospital  6/27/2025 0315 by Derrell Chan RN  Outcome: Progressing  6/27/2025 0313 by Derrell Chan RN  Outcome: Progressing  Goal: Verbalize understanding of risk factor reduction measures to prevent injury from fall in the home  6/27/2025 0315 by Derrell Chan RN  Outcome: Progressing  6/27/2025 0313 by Derrell Chan RN  Outcome: Progressing  Goal: Use assistive devices by end of the shift  6/27/2025 0315 by Derrell Chan RN  Outcome: Progressing  6/27/2025 0313 by Derrell Chan RN  Outcome: Progressing  Goal: Pace activities to prevent fatigue by end of the shift  6/27/2025 0315 by Derrell Chan RN  Outcome: Progressing  6/27/2025 0313 by Derrell Chan RN  Outcome: Progressing   The patient's goals for the shift include      The clinical goals for the shift include patient will remain safe and stable throughout shift

## 2025-06-27 NOTE — CARE PLAN
Problem: Pain  Goal: Takes deep breaths with improved pain control throughout the shift  6/27/2025 1803 by Rosalva Lu RN  Outcome: Met  6/27/2025 1030 by Rosalva Lu RN  Outcome: Progressing  Goal: Turns in bed with improved pain control throughout the shift  6/27/2025 1803 by Rosalva Lu RN  Outcome: Met  6/27/2025 1030 by Rosalva Lu RN  Outcome: Progressing  Goal: Walks with improved pain control throughout the shift  6/27/2025 1803 by Rosalva Lu RN  Outcome: Met  6/27/2025 1030 by Rosalva Lu RN  Outcome: Progressing  Goal: Performs ADL's with improved pain control throughout shift  6/27/2025 1803 by Rosalva Lu RN  Outcome: Met  6/27/2025 1030 by Rosalva Lu RN  Outcome: Progressing  Goal: Participates in PT with improved pain control throughout the shift  6/27/2025 1803 by Rosalva Lu RN  Outcome: Met  6/27/2025 1030 by Rosalva Lu RN  Outcome: Progressing  Goal: Free from opioid side effects throughout the shift  6/27/2025 1803 by Rosalva Lu RN  Outcome: Met  6/27/2025 1030 by Rosalva Lu RN  Outcome: Progressing  Goal: Free from acute confusion related to pain meds throughout the shift  6/27/2025 1803 by Rosalva Lu RN  Outcome: Met  6/27/2025 1030 by Rosalva Lu RN  Outcome: Progressing   The patient's goals for the shift include      The clinical goals for the shift include Patient will be HD stable

## 2025-06-28 LAB
Q ONSET: 214 MS
QRS COUNT: 17 BEATS
QRS DURATION: 80 MS
QT INTERVAL: 340 MS
QTC CALCULATION(BAZETT): 440 MS
QTC FREDERICIA: 404 MS
R AXIS: -56 DEGREES
T AXIS: 98 DEGREES
T OFFSET: 384 MS
VENTRICULAR RATE: 101 BPM

## 2025-06-30 ENCOUNTER — PATIENT OUTREACH (OUTPATIENT)
Dept: PRIMARY CARE | Facility: CLINIC | Age: 80
End: 2025-06-30
Payer: MEDICARE

## 2025-06-30 NOTE — PROGRESS NOTES
Discharge Facility: [facility]  Discharge Diagnosis: Pyelonephritis, A-fib, Hypotension  Admission Date: 6/20/2025  Procedure Date: 6/24/2025 Cystoscopy, Left ureteral stent insertion; 6/26/2025 Transesophageal echo (brock) w/ possible cardioversion   Discharge Date: 6/27/2025    Issues Requiring Follow-Up   Holding diuretics until blood pressure stabilizes     PCP Appointment Date: Appointment with Azalia Way MD (07/14/2025)   Specialist Appointment Date: Procedure with Elmer Hermosillo MD (07/18/2025) LAAO (left atrial appendage occlusion) - Watchman device  Appointment with Dilshad Wright MD (07/21/2025) Urology,  Appointment with Carl B Gillombardo, MD (08/01/2025) Cardiology    Hospital Encounter and Summary Linked: Yes  ED to Hosp-Admission (Discharged) with Aliyah Xavier MD; Burke Henderson MD (06/20/2025)     See discharge assessment below for further details:    Wrap Up  Wrap Up Additional Comments: 80yoF presented to the hospital with sudden onset of left flank pain. Workup showed pyelonephritis/kidney stone/hydronephrosis. Urology consulted and patient started on abx and had a cystoscopy with successful stent placement. Patient in a-fib and cardiology consulted. Cardioversion attempted, but unsuccessful. Patient treated with amiodarone and converted to NSR. BP low so diuretics on hold until outpatient follow up. At time of outreach, other than weakness, the patient felt improved. Denied any pain from stent or difficulty with urination. Also denied CP or SOB. Patient verablized understanding of medication changes made. Urology and cardiology follow ups scheduled. (6/30/2025 10:07 AM)    Medications  Medications reviewed with patient/caregiver?: Yes (Patient) (6/30/2025 10:07 AM)  Is the patient having any side effects they believe may be caused by any medication additions or changes?: No (6/30/2025 10:07 AM)  Does the patient have all medications ordered at discharge?: Yes (6/30/2025  10:07 AM)  Care Management Interventions: No intervention needed (6/30/2025 10:07 AM)  Prescription Comments: NEW: amiiodarone and tamsulosin. CHANGED: dilitiazem decreased. PAUSED: spironolactone and torsemide. (6/30/2025 10:07 AM)  Is the patient taking all medications as directed (includes completed medication regime)?: Yes (6/30/2025 10:07 AM)  Care Management Interventions: Provided patient education (6/30/2025 10:07 AM)  Medication Comments: Verbalized understanding of medication changes. (6/30/2025 10:07 AM)    Appointments  Does the patient have a primary care provider?: Yes (6/30/2025 10:07 AM)  Care Management Interventions: Verified appointment date/time/provider (6/30/2025 10:07 AM)  Has the patient kept scheduled appointments due by today?: Not applicable (6/30/2025 10:07 AM)  Care Management Interventions: Advised to schedule with specialist (Urology and Cardiology scheduled.) (6/30/2025 10:07 AM)    Self Management  What is the home health agency?: N/A (6/30/2025 10:07 AM)  What Durable Medical Equipment (DME) was ordered?: N/A (6/30/2025 10:07 AM)    Patient Teaching  Does the patient have access to their discharge instructions?: Yes (6/30/2025 10:07 AM)  Care Management Interventions: Reviewed instructions with patient (6/30/2025 10:07 AM)  What is the patient's perception of their health status since discharge?: Improving (6/30/2025 10:07 AM)  Is the patient/caregiver able to teach back the hierarchy of who to call/visit for symptoms/problems? PCP, Specialist, Home Health nurse, Urgent Care, ED, 911: Yes (6/30/2025 10:07 AM)  Patient/Caregiver Education Comments: Patient verbalized understanding of discharge instructions. Provided contact information for nonurgent questions or concerns. (6/30/2025 10:07 AM)

## 2025-07-01 ENCOUNTER — TELEPHONE (OUTPATIENT)
Dept: CARDIOLOGY | Facility: CLINIC | Age: 80
End: 2025-07-01
Payer: MEDICARE

## 2025-07-09 ENCOUNTER — APPOINTMENT (OUTPATIENT)
Dept: PRIMARY CARE | Facility: CLINIC | Age: 80
End: 2025-07-09
Payer: MEDICARE

## 2025-07-09 VITALS
SYSTOLIC BLOOD PRESSURE: 110 MMHG | DIASTOLIC BLOOD PRESSURE: 60 MMHG | WEIGHT: 256 LBS | BODY MASS INDEX: 42.65 KG/M2 | HEIGHT: 65 IN

## 2025-07-09 DIAGNOSIS — E78.49 OTHER HYPERLIPIDEMIA: ICD-10-CM

## 2025-07-09 DIAGNOSIS — E03.9 ACQUIRED HYPOTHYROIDISM: ICD-10-CM

## 2025-07-09 DIAGNOSIS — N18.31 STAGE 3A CHRONIC KIDNEY DISEASE (MULTI): ICD-10-CM

## 2025-07-09 DIAGNOSIS — Z09 HOSPITAL DISCHARGE FOLLOW-UP: Primary | ICD-10-CM

## 2025-07-09 DIAGNOSIS — R60.0 PERIPHERAL EDEMA: ICD-10-CM

## 2025-07-09 DIAGNOSIS — N12 PYELONEPHRITIS: ICD-10-CM

## 2025-07-09 DIAGNOSIS — I10 BENIGN ESSENTIAL HTN: ICD-10-CM

## 2025-07-09 DIAGNOSIS — N20.0 KIDNEY STONES: ICD-10-CM

## 2025-07-09 DIAGNOSIS — R73.03 PREDIABETES: ICD-10-CM

## 2025-07-09 DIAGNOSIS — M79.7 FIBROMYALGIA: ICD-10-CM

## 2025-07-09 DIAGNOSIS — F41.9 ANXIETY: ICD-10-CM

## 2025-07-09 DIAGNOSIS — I48.0 PAROXYSMAL ATRIAL FIBRILLATION (MULTI): ICD-10-CM

## 2025-07-09 PROCEDURE — 1111F DSCHRG MED/CURRENT MED MERGE: CPT | Performed by: INTERNAL MEDICINE

## 2025-07-09 PROCEDURE — 3078F DIAST BP <80 MM HG: CPT | Performed by: INTERNAL MEDICINE

## 2025-07-09 PROCEDURE — 1159F MED LIST DOCD IN RCRD: CPT | Performed by: INTERNAL MEDICINE

## 2025-07-09 PROCEDURE — 3074F SYST BP LT 130 MM HG: CPT | Performed by: INTERNAL MEDICINE

## 2025-07-09 PROCEDURE — 1036F TOBACCO NON-USER: CPT | Performed by: INTERNAL MEDICINE

## 2025-07-09 PROCEDURE — 1160F RVW MEDS BY RX/DR IN RCRD: CPT | Performed by: INTERNAL MEDICINE

## 2025-07-09 PROCEDURE — 99495 TRANSJ CARE MGMT MOD F2F 14D: CPT | Performed by: INTERNAL MEDICINE

## 2025-07-09 RX ORDER — TORSEMIDE 20 MG/1
1 TABLET ORAL
COMMUNITY
Start: 2025-04-17 | End: 2025-07-09 | Stop reason: ALTCHOICE

## 2025-07-09 ASSESSMENT — PATIENT HEALTH QUESTIONNAIRE - PHQ9
1. LITTLE INTEREST OR PLEASURE IN DOING THINGS: NOT AT ALL
SUM OF ALL RESPONSES TO PHQ9 QUESTIONS 1 AND 2: 0
2. FEELING DOWN, DEPRESSED OR HOPELESS: NOT AT ALL

## 2025-07-09 NOTE — PROGRESS NOTES
"Subjective   Patient ID: Millie Riley is a 80 y.o. female who presents for Hospital Follow-up (pylonephritis).  Patient is accompanied by her daughter-in-law Nisha who is in the room with patient's consent to provide support and coordinate care.  HPI   Patient is an 80-year-old  female who comes today for follow-up after recent hospitalization from 6/21 - 6/27 for left flank pain-patient was diagnosed with pyelonephritis/kidney stones,she was evaluated by urology and underwent stent placement.  She was treated with antibiotics and subsequently discharged.  Of note is that during the hospitalization patient's blood pressure was noted to be low, diuretic therapy was held and she was also noted to be in atrial fibrillation.  Patient has since consulted her cardiologist who has advised her to resume diuretic therapy due to increased leg swelling.  Patient is scheduled for the Watchman procedure in the near future.  Labs done in the hospitalization were reviewed and noted-BUN/creatinine were elevated at 32/1.1, hemoglobin/hematocrit were slightly decreased at 11.8/34.5, hemoglobin A1c was slightly up at 5.8.  Medications were reviewed and updated in the medical record, patient is compliant with them and reports no significant side effects.    Since discharge, patient has been feeling fine and she denies any fever, chills, chest pain, shortness of breath, palpitations, dizziness, syncope, flank pain, dysuria, hematuria, abdominal pain, vomiting, diarrhea, melena, rectal bleeding.  Review of Systems  As per HPI.  Objective   /60 (BP Location: Right arm, Patient Position: Sitting, BP Cuff Size: Large adult)   Ht 1.651 m (5' 5\")   Wt 116 kg (256 lb)   BMI 42.60 kg/m²     Physical Exam  General - well developed, well appearing, morbidly obese, elderly  female in no acute respiratory distress  Eyes - normal sclera and conjunctiva with no pallor or icterus, normal extraocular movements  Neck - " No JVD, thyromegaly or lymphadenopathy  Lungs - no respiratory distress and lungs clear to auscultation bilaterally  Heart - normal S1, S2 with irregular rhythm,  and systolic murmur heard in the left sternal border  Abdomen - soft, nontender with no masses or organomegaly  Extremities -bilateral lower extremity edema, chronic  Neuro - grossly normal neuro exam with no focal neuro deficits  Psych - normal mental status, mood and affect   Skin -erythematous and scaly skin noted on bilateral shins but no open areas of discharge  MSK - normal gait with grossly normal ROM of major joints  Assessment/Plan       1.  Hospital discharge follow-up-patient was recently hospitalized for flank pain attributed to pyelonephritis/kidney stones, she was evaluated by urology and underwent stent placement, patient is completing antibiotic therapy and does not have any symptoms currently  2.  Pyelonephritis, kidney stones-status post stent placement, patient advised to follow-up with urology  3.  Hypertension-blood pressure is fine currently, patient has started both diuretics including spironolactone and torsemide 5 days ago per cardiology advice  4.  Paroxysmal atrial fibrillation-patient is on amiodarone, she is scheduled to undergo the Watchman procedure soon  5.  Hypothyroidism-patient will continue current dose of levothyroxine  6.  Anxiety, fibromyalgia-patient will continue duloxetine as prescribed  7.  Peripheral edema, chronic-patient is resume diuretic therapy with spironolactone and torsemide  8.  Stage IIIa CKD-stable  9.  Hyperlipidemia-patient will continue atorvastatin as prescribed  Follow-up in the near future for Medicare annual wellness exam and rest of labs.  Plans discussed with patient and daughter-in-law Nisha in detail.  40 minutes spent rooming the patient, reviewing records, eliciting history, examining patient, counseling, coordination of care and in documentation.  This note was partially generated using  the Dragon voice recognition system. There may be some incorrect words, spelling and punctuation errors that were not corrected prior to committing the note to the patient's medical record.  She is on anticoagulation with Eliquis

## 2025-07-11 DIAGNOSIS — I48.91 ATRIAL FIBRILLATION, UNSPECIFIED TYPE (MULTI): ICD-10-CM

## 2025-07-12 DIAGNOSIS — E78.49 OTHER HYPERLIPIDEMIA: ICD-10-CM

## 2025-07-14 ENCOUNTER — APPOINTMENT (OUTPATIENT)
Dept: PRIMARY CARE | Facility: CLINIC | Age: 80
End: 2025-07-14
Payer: MEDICARE

## 2025-07-14 PROBLEM — Z95.818 PRESENCE OF WATCHMAN LEFT ATRIAL APPENDAGE CLOSURE DEVICE: Status: ACTIVE | Noted: 2025-07-14

## 2025-07-14 PROBLEM — I48.91 ATRIAL FIBRILLATION, UNSPECIFIED TYPE (MULTI): Status: ACTIVE | Noted: 2025-07-14

## 2025-07-14 RX ORDER — NAPROXEN SODIUM 220 MG/1
81 TABLET, FILM COATED ORAL DAILY
Status: CANCELLED | OUTPATIENT
Start: 2025-07-18

## 2025-07-14 RX ORDER — ONDANSETRON 4 MG/1
4 TABLET, FILM COATED ORAL EVERY 8 HOURS PRN
Status: CANCELLED | OUTPATIENT
Start: 2025-07-18

## 2025-07-14 RX ORDER — LIDOCAINE HYDROCHLORIDE AND EPINEPHRINE 10; 20 UG/ML; MG/ML
5 INJECTION, SOLUTION INFILTRATION; PERINEURAL ONCE
Status: CANCELLED | OUTPATIENT
Start: 2025-07-18

## 2025-07-14 RX ORDER — ONDANSETRON HYDROCHLORIDE 2 MG/ML
4 INJECTION, SOLUTION INTRAVENOUS EVERY 8 HOURS PRN
Status: CANCELLED | OUTPATIENT
Start: 2025-07-18

## 2025-07-14 RX ORDER — ATORVASTATIN CALCIUM 20 MG/1
20 TABLET, FILM COATED ORAL DAILY
Qty: 90 TABLET | Refills: 1 | OUTPATIENT
Start: 2025-07-14

## 2025-07-14 RX ORDER — CLOPIDOGREL BISULFATE 75 MG/1
75 TABLET ORAL DAILY
Status: CANCELLED | OUTPATIENT
Start: 2025-07-18 | End: 2026-07-18

## 2025-07-14 RX ORDER — ACETAMINOPHEN 160 MG/5ML
650 SOLUTION ORAL EVERY 6 HOURS PRN
Status: CANCELLED | OUTPATIENT
Start: 2025-07-18

## 2025-07-14 RX ORDER — SENNOSIDES 8.6 MG/1
2 TABLET ORAL 2 TIMES DAILY
Status: CANCELLED | OUTPATIENT
Start: 2025-07-18

## 2025-07-14 RX ORDER — ACETAMINOPHEN 650 MG/1
650 SUPPOSITORY RECTAL EVERY 6 HOURS PRN
Status: CANCELLED | OUTPATIENT
Start: 2025-07-18

## 2025-07-14 RX ORDER — ACETAMINOPHEN 325 MG/1
650 TABLET ORAL EVERY 6 HOURS PRN
Status: CANCELLED | OUTPATIENT
Start: 2025-07-18

## 2025-07-14 NOTE — DISCHARGE INSTRUCTIONS
Watchman Discharge Instructions    Anticoagulation Plan:    ***You are to resume a half dose of your DOAC (ie: Eliquis, Xarelto, or Pradaxa) for 3 months post Watchman, then stop.  Then start 81mg Aspirin daily, for life, the day after your last dose of blood thinner.  ***You are to take 81mg baby Aspirin and Plavix for 6 months (Plavix will be stopped after 6 months and you will remain on 81mg Aspirin for life).  You will have a 4 month CTA to assess the effectiveness of the Watchman Device.     General Instructions:  DO NOT drink any alcoholic drinks or take any non-prescriptive medications that contain alcohol for the first 24 hours.   DO NOT make any important decisions for the first 24 hours.  Activity:  You are advised to go directly home from the hospital.   DO NOT lift anything heavier than 10 pounds for one week, this allows for proper healing of the groin.   No excessive exercise or treadmill use for one week. You may walk and do stairs, slowly.   No sexual activities for 24 hours after you arrive home.    Wound Care:  If slight bleeding should occur at site, lie down and have someone apply firm pressure just above the puncture site for 5 minutes.  If it continues or is profuse, call 911. Always notify your doctor if bleeding occurs.   Keep site clean and dry. Let air dry or you may use a simple bandaid.   Gently cleanse the puncture site in your groin with soap and water only.   You may experience some tenderness, bruising or minimal inflammation.  If you have any concerns, you may contact the Cath Lab or if any of these symptoms become excessive, contact your cardiologist or go to the emergency room.   No tub baths, soaking, or swimming for one week.   May shower the next day after your procedure.    Diet:  You may resume your normal diet. However it is better to start with liquids such as juices then soup and crackers, and gradually work up to solid foods.    Other Instructions:  If you develop  difficulty breathing, rash, hives, severe nausea, vomiting, light-headedness or any signs of infection, immediately contact your doctor and go to the nearest emergency room.   You must take your aspirin, clopidogrel (Plavix), prasugrel (Effient), or ticagrelor (Brilinta) every day without missing a single dose.  If you are getting low on these medications, contact your physician immediately for a refill.  - CALL 911 IF YOU HAVE ANY OF THE SIGNS AND SYMPTOMS OF HEART FAILURE: 1. Chest pain 2. Significant Shortness of breath 3. Fainting.     Call Provider If (Home-going Patients):  Breathing faster than normal.   Breathing harder than normal or having retractions.   Fever of 100.4 F (38 C) or higher.   Chills.   Drinking less than normal.   Urinating less than normal, over 1 day.   Acting very sleepy and difficult to awaken.   Vomiting (throwing up) and not able to eat or drink for 12 hours.   3 or more loose, watery bowel movements in 24 hours (diarrhea).   Any new concerning symptoms.    Please call the STRUCTURAL HEART TEAM LINE if you have any questions or concerns - [phone]   [facility] nurse coordinator Luanne Crawford's phone is [phone].

## 2025-07-16 NOTE — PROGRESS NOTES
Pharmacy Medication History Review    Millie Riley is a 80 y.o. female who is planned to be admitted for Encounter for examination for normal comparison and control in clinical research program. Pharmacy called the patient prior to their scheduled procedure and reviewed the patient's [id number] medications for accuracy.    Medications ADDED:  none  Medications CHANGED:  Torsemide 40mg TO torsemide 20mg  Medications REMOVED:   none    Please review updated prior to admission medication list and comments regarding how patient may be taking medications differently by going to Admission tab --> Admission Orders --> Admit Orders / Review prior to admission medications.     Preferred pharmacy, last doses of medications, and allergies to be confirmed with patient by nursing the day of procedure.     Sources used to complete the med history include:  [facility]  Pharmacy dispense history  Patient Interview Good historian  Chart Review  Care Everywhere     Below are additional concerns with the patient's PTA list.  Patient states they are taking #1 capsule of diltiazem CD 120mg twice daily. L.F. 06/27/25 #60/30d. States they are not taking 180mg L.F.07/11/25 #90/90d  Patient states they are taking #1 tablet of torsemide 20mg once daily. L.F. 04/17/25 #90/90d. Patient states their pharmacy never got the prescription of 40mg, so never started that dose.   Patient confirmed they are taking #1 tablet of amiodarone 200mg once daily    Katie Watson Ashley  Please reach out via Secure Chat for questions

## 2025-07-18 ENCOUNTER — HOSPITAL ENCOUNTER (OUTPATIENT)
Dept: RADIOLOGY | Facility: HOSPITAL | Age: 80
Discharge: HOME | End: 2025-07-18
Payer: MEDICARE

## 2025-07-18 ENCOUNTER — HOSPITAL ENCOUNTER (OUTPATIENT)
Facility: HOSPITAL | Age: 80
Setting detail: SURGERY ADMIT
Discharge: HOME | DRG: 274 | End: 2025-07-18
Attending: INTERNAL MEDICINE | Admitting: INTERNAL MEDICINE
Payer: MEDICARE

## 2025-07-18 ENCOUNTER — APPOINTMENT (OUTPATIENT)
Dept: CARDIOLOGY | Facility: HOSPITAL | Age: 80
DRG: 274 | End: 2025-07-18
Payer: MEDICARE

## 2025-07-18 ENCOUNTER — HOSPITAL ENCOUNTER (INPATIENT)
Dept: CARDIOLOGY | Facility: HOSPITAL | Age: 80
Discharge: HOME | DRG: 274 | End: 2025-07-18
Payer: MEDICARE

## 2025-07-18 VITALS
HEIGHT: 65 IN | DIASTOLIC BLOOD PRESSURE: 77 MMHG | RESPIRATION RATE: 16 BRPM | SYSTOLIC BLOOD PRESSURE: 133 MMHG | BODY MASS INDEX: 42.61 KG/M2 | OXYGEN SATURATION: 95 % | HEART RATE: 90 BPM | WEIGHT: 255.73 LBS

## 2025-07-18 DIAGNOSIS — Z95.818 PRESENCE OF WATCHMAN LEFT ATRIAL APPENDAGE CLOSURE DEVICE: ICD-10-CM

## 2025-07-18 DIAGNOSIS — Z09 POSTOP CHECK: ICD-10-CM

## 2025-07-18 DIAGNOSIS — Z01.810 PREOP CARDIOVASCULAR EXAM: ICD-10-CM

## 2025-07-18 DIAGNOSIS — Z00.6 ENCOUNTER FOR EXAMINATION FOR NORMAL COMPARISON AND CONTROL IN CLINICAL RESEARCH PROGRAM: ICD-10-CM

## 2025-07-18 DIAGNOSIS — I48.91 ATRIAL FIBRILLATION, UNSPECIFIED TYPE (MULTI): ICD-10-CM

## 2025-07-18 DIAGNOSIS — I48.91 ATRIAL FIBRILLATION, UNSPECIFIED TYPE (MULTI): Primary | ICD-10-CM

## 2025-07-18 DIAGNOSIS — Z98.890 OTHER SPECIFIED POSTPROCEDURAL STATES: ICD-10-CM

## 2025-07-18 DIAGNOSIS — I48.91 ATRIAL FIBRILLATION (MULTI): ICD-10-CM

## 2025-07-18 LAB
ABO GROUP (TYPE) IN BLOOD: NORMAL
ACT BLD: 306 SEC (ref 83–199)
ANTIBODY SCREEN: NORMAL
EJECTION FRACTION: 63 %
RH FACTOR (ANTIGEN D): NORMAL

## 2025-07-18 PROCEDURE — 93010 ELECTROCARDIOGRAM REPORT: CPT | Performed by: INTERNAL MEDICINE

## 2025-07-18 PROCEDURE — 2500000004 HC RX 250 GENERAL PHARMACY W/ HCPCS (ALT 636 FOR OP/ED): Performed by: INTERNAL MEDICINE

## 2025-07-18 PROCEDURE — 2720000007 HC OR 272 NO HCPCS: Performed by: INTERNAL MEDICINE

## 2025-07-18 PROCEDURE — 02L73DK OCCLUSION OF LEFT ATRIAL APPENDAGE WITH INTRALUMINAL DEVICE, PERCUTANEOUS APPROACH: ICD-10-PCS | Performed by: INTERNAL MEDICINE

## 2025-07-18 PROCEDURE — C1894 INTRO/SHEATH, NON-LASER: HCPCS | Performed by: INTERNAL MEDICINE

## 2025-07-18 PROCEDURE — 7100000009 HC PHASE TWO TIME - INITIAL BASE CHARGE: Performed by: INTERNAL MEDICINE

## 2025-07-18 PROCEDURE — 93662 INTRACARDIAC ECG (ICE): CPT | Performed by: INTERNAL MEDICINE

## 2025-07-18 PROCEDURE — 1210000006 HC SEMI PRIVATE ROOM - IP ONLY PROCEDURE WITH INTENT

## 2025-07-18 PROCEDURE — 93308 TTE F-UP OR LMTD: CPT | Performed by: SPECIALIST

## 2025-07-18 PROCEDURE — C1760 CLOSURE DEV, VASC: HCPCS | Performed by: INTERNAL MEDICINE

## 2025-07-18 PROCEDURE — 76937 US GUIDE VASCULAR ACCESS: CPT | Performed by: INTERNAL MEDICINE

## 2025-07-18 PROCEDURE — 33340 PERQ CLSR TCAT L ATR APNDGE: CPT | Performed by: INTERNAL MEDICINE

## 2025-07-18 PROCEDURE — 36415 COLL VENOUS BLD VENIPUNCTURE: CPT | Performed by: NURSE PRACTITIONER

## 2025-07-18 PROCEDURE — 99152 MOD SED SAME PHYS/QHP 5/>YRS: CPT | Performed by: INTERNAL MEDICINE

## 2025-07-18 PROCEDURE — 99153 MOD SED SAME PHYS/QHP EA: CPT | Performed by: INTERNAL MEDICINE

## 2025-07-18 PROCEDURE — 2550000001 HC RX 255 CONTRASTS: Performed by: INTERNAL MEDICINE

## 2025-07-18 PROCEDURE — 1210000001 HC SEMI-PRIVATE ROOM DAILY

## 2025-07-18 PROCEDURE — 75572 CT HRT W/3D IMAGE: CPT

## 2025-07-18 PROCEDURE — 93005 ELECTROCARDIOGRAM TRACING: CPT

## 2025-07-18 PROCEDURE — 2500000004 HC RX 250 GENERAL PHARMACY W/ HCPCS (ALT 636 FOR OP/ED): Performed by: NURSE PRACTITIONER

## 2025-07-18 PROCEDURE — 93308 TTE F-UP OR LMTD: CPT

## 2025-07-18 PROCEDURE — C1889 IMPLANT/INSERT DEVICE, NOC: HCPCS | Performed by: INTERNAL MEDICINE

## 2025-07-18 PROCEDURE — 2500000005 HC RX 250 GENERAL PHARMACY W/O HCPCS: Performed by: INTERNAL MEDICINE

## 2025-07-18 PROCEDURE — 85347 COAGULATION TIME ACTIVATED: CPT

## 2025-07-18 PROCEDURE — 2500000001 HC RX 250 WO HCPCS SELF ADMINISTERED DRUGS (ALT 637 FOR MEDICARE OP): Performed by: NURSE PRACTITIONER

## 2025-07-18 PROCEDURE — 2550000001 HC RX 255 CONTRASTS: Mod: JW | Performed by: INTERNAL MEDICINE

## 2025-07-18 PROCEDURE — 85347 COAGULATION TIME ACTIVATED: CPT | Performed by: INTERNAL MEDICINE

## 2025-07-18 PROCEDURE — 86901 BLOOD TYPING SEROLOGIC RH(D): CPT | Performed by: NURSE PRACTITIONER

## 2025-07-18 PROCEDURE — 2780000003 HC OR 278 NO HCPCS: Performed by: INTERNAL MEDICINE

## 2025-07-18 PROCEDURE — 7100000010 HC PHASE TWO TIME - EACH INCREMENTAL 1 MINUTE: Performed by: INTERNAL MEDICINE

## 2025-07-18 PROCEDURE — C1759 CATH, INTRA ECHOCARDIOGRAPHY: HCPCS | Performed by: INTERNAL MEDICINE

## 2025-07-18 DEVICE — IMPLANTABLE DEVICE: Type: IMPLANTABLE DEVICE | Site: HEART | Status: FUNCTIONAL

## 2025-07-18 RX ORDER — LIDOCAINE HYDROCHLORIDE 10 MG/ML
INJECTION, SOLUTION EPIDURAL; INFILTRATION; INTRACAUDAL; PERINEURAL AS NEEDED
Status: DISCONTINUED | OUTPATIENT
Start: 2025-07-18 | End: 2025-07-18 | Stop reason: HOSPADM

## 2025-07-18 RX ORDER — FENTANYL CITRATE 50 UG/ML
INJECTION, SOLUTION INTRAMUSCULAR; INTRAVENOUS AS NEEDED
Status: DISCONTINUED | OUTPATIENT
Start: 2025-07-18 | End: 2025-07-18 | Stop reason: HOSPADM

## 2025-07-18 RX ORDER — CEFAZOLIN SODIUM 2 G/50ML
2 SOLUTION INTRAVENOUS ONCE
Status: COMPLETED | OUTPATIENT
Start: 2025-07-18 | End: 2025-07-18

## 2025-07-18 RX ORDER — NAPROXEN SODIUM 220 MG/1
324 TABLET, FILM COATED ORAL ONCE
Status: COMPLETED | OUTPATIENT
Start: 2025-07-18 | End: 2025-07-18

## 2025-07-18 RX ORDER — MIDAZOLAM HYDROCHLORIDE 1 MG/ML
INJECTION, SOLUTION INTRAMUSCULAR; INTRAVENOUS AS NEEDED
Status: DISCONTINUED | OUTPATIENT
Start: 2025-07-18 | End: 2025-07-18 | Stop reason: HOSPADM

## 2025-07-18 RX ORDER — PROTAMINE SULFATE 10 MG/ML
INJECTION, SOLUTION INTRAVENOUS CONTINUOUS PRN
Status: COMPLETED | OUTPATIENT
Start: 2025-07-18 | End: 2025-07-18

## 2025-07-18 RX ORDER — HEPARIN SODIUM 1000 [USP'U]/ML
INJECTION, SOLUTION INTRAVENOUS; SUBCUTANEOUS AS NEEDED
Status: DISCONTINUED | OUTPATIENT
Start: 2025-07-18 | End: 2025-07-18 | Stop reason: HOSPADM

## 2025-07-18 RX ADMIN — IOHEXOL 70 ML: 350 INJECTION, SOLUTION INTRAVENOUS at 11:51

## 2025-07-18 RX ADMIN — CEFAZOLIN SODIUM 2 G: 2 SOLUTION INTRAVENOUS at 13:15

## 2025-07-18 RX ADMIN — ASPIRIN 81 MG CHEWABLE TABLET 324 MG: 81 TABLET CHEWABLE at 12:52

## 2025-07-18 NOTE — PRE-SEDATION DOCUMENTATION
Patient: Millie Riley  MRN: [id number]  NPO guidelines met: Yes    Physical Exam    Airway  Mallampati: III     Cardiovascular    Dental    Pulmonary        Plan    ASA 3     Mild

## 2025-07-18 NOTE — H&P
History Of Present Illness   This is a 79 y.o. female with hypertension, hyperlipidemia, lymphedema, heart failure with preserved ejection fraction and paroxysmal atrial fibrillation.  The patient has a history of mechanical fall, hematuria and noncompliance.  All of these reasons are substantial indications to consider nonpharmacological approach to stroke risk reduction.        The CHADS-VASC score is 5 and HAS-BLED score is 4.     Past Medical History  Medical History[1]    Surgical History  Surgical History[2]     Social History  She reports that she has never smoked. She has never used smokeless tobacco. She reports current alcohol use. She reports that she does not use drugs.    Family History  Family History[3]     Allergies  Patient has no known allergies.    Review of Systems   Full 14 point ROS complete and negative except as noted above.    Physical Exam   AO x 3  CVS- normal s1, s2, no murmurs  Resp -CTAB  Abd- Soft, NT, ND  Neuro  No gross motor. Sensory deficits   No LE edema      Last Recorded Vitals  There were no vitals taken for this visit.    Relevant Results    Last I/O:  No intake/output data recorded.    Last Labs:  eGFR Cre: 49 at 6/27/2025  5:59 AM  Calculated from:  Serum Creatinine: 1.13 mg/dL at 6/27/2025  5:59 AM  Age: 79 years  Sex: Female at 6/27/2025  5:59 AM  Calculated using the CKD-EPI Creatinine Equation (2021)    CBC - 6/27/2025:  5:59 AM  6.8 11.2 288    33.3      BMP  138  104  32                  ----------------<104     4.3  23  1.13     CMP - 6/27/2025:  5:59 AM  8.5 7.6 23 --- 0.6   3.9 4.2 29 94      PTT - No results in last year.  1.3   14.4 _     BNP   Date/Time Value Ref Range Status   06/13/2022 11:59 AM 26 0 - 99 pg/mL Final     Comment:     .  <100 pg/mL - Heart failure unlikely  100-299 pg/mL - Intermediate probability of acute heart  .               failure exacerbation. Correlate with clinical  .               context and patient history.    >=300 pg/mL - Heart  Failure likely. Correlate with clinical  .               context and patient history.   Biotin interference may cause falsely decreased results.   Patients taking a Biotin dose of up to 5 mg/day should   refrain from taking Biotin for 24 hours before sample   collection. Providers may contact their local laboratory   for further information.          Ejection Fractions:  EF   Date/Time Value Ref Range Status   06/26/2025 03:06 PM 53 %    01/02/2025 10:03 AM 58 %    01/02/2025 09:26 AM 58 %          Assessment & Plan  Encounter for examination for normal comparison and control in clinical research program    Atrial fibrillation (Multi)    Presence of Watchman left atrial appendage closure device    Atrial fibrillation, unspecified type (Multi)      LAAO procedure today    I spent 30 minutes in the professional and overall care of this patient.      Dameon Edmond MD         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 06/27/2023    Hyperlipidemia     Hypertension     Other conditions influencing health status     Skin Abscess Of Toes    Pain in right axilla 03/27/2024    Personal history of COVID-19 06/15/2022    Personal history of other diseases of the musculoskeletal system and connective tissue     History of bursitis    Personal history of other diseases of the nervous system and sense organs     History of sciatica    Ulcer of ankle (Multi) 03/27/2024   [2]   Past Surgical History:  Procedure Laterality Date    CYSTOSCOPY W/ URETERAL STENT PLACEMENT Left 06/24/2025    TOTAL KNEE ARTHROPLASTY  06/27/2013    Knee Replacement    US GUIDED THYROID BIOPSY  03/10/2014    US GUIDED THYROID BIOPSY 3/10/2014 [facility]  ANCILLARY LEGACY   [3] No family history on file.

## 2025-07-18 NOTE — DISCHARGE SUMMARY
Discharge Diagnosis  Encounter for examination for normal comparison and control in clinical research program    Issues Requiring Follow-Up  None    Test Results Pending At Discharge  Pending Labs       No current pending labs.            Hospital Course   STRUCTURAL HEART INPATIENT DISCHARGE SUMMARY - [facility]     BRIEF OVERVIEW  Admitting Provider: Elmer Hermosillo MD  Discharge Provider: Elmer Hermosillo MD  Primary Care Physician at Discharge: [address] [phone]     Admission Date: 7/18/2025     Discharge Date: 7/18/2025    Primary Discharge Diagnosis  Encounter for examination for normal comparison and control in clinical research program    Discharge Disposition  Home  Code Status at Discharge: Full    Active Issues Requiring Follow-up  None    Outpatient Follow-Up  Future Appointments   Date Time Provider Department Center   7/21/2025  2:30 PM Dilshad Wright MD [facility]   8/29/2025  4:00 PM Carl B Gillombardo, MD [facility]   10/17/2025 10:30 AM AHU CT 1 [facility] Rad       Recent Results (from the past 48 hours)   Type And Screen    Collection Time: 07/18/25 12:50 PM   Result Value Ref Range    ABO TYPE O     Rh TYPE POS     ANTIBODY SCREEN NEG    ACTIVATED CLOTTING TIME LOW    Collection Time: 07/18/25  1:32 PM   Result Value Ref Range    POCT Activated Clotting Time Low Range 306 (H) 83 - 199 sec   Transthoracic Echo (TTE) Limited    Collection Time: 07/18/25  3:18 PM   Result Value Ref Range    BSA 2.31 m2       Test Results Pending at Discharge  Pending Labs       No current pending labs.                 Your medication list        CHANGE how you take these medications        Instructions Last Dose Given Next Dose Due   amiodarone 200 mg tablet  Commonly known as: Pacerone  Start taking on: June 27, 2025  What changed: See the new instructions.      Take 2 tablets (400 mg) by mouth 2 times a day for 12 days, THEN 1 tablet (200 mg) once daily.       apixaban 5 mg tablet  Commonly known  as: Eliquis  What changed: how much to take      Take 0.5 tablets (2.5 mg) by mouth every 12 hours.              CONTINUE taking these medications        Instructions Last Dose Given Next Dose Due   ascorbic acid (vitamin C) 500 mg capsule           atorvastatin 40 mg tablet  Commonly known as: Lipitor      Take 1 tablet (40 mg) by mouth once daily.       cholecalciferol 50 mcg (2,000 units) tablet  Commonly known as: Vitamin D-3           Daily Multi-Vitamin tablet  Generic drug: multivitamin           dilTIAZem  mg 24 hr capsule  Commonly known as: Cardizem CD      Take 1 capsule (120 mg) by mouth 2 times a day.       DULoxetine 60 mg DR capsule  Commonly known as: Cymbalta      TAKE 1 CAPSULE BY MOUTH ONCE DAILY.       levothyroxine 25 mcg tablet  Commonly known as: Synthroid, Levoxyl      TAKE 1 TABLET BY MOUTH EVERY DAY       spironolactone 25 mg tablet  Commonly known as: Aldactone      Take 1 tablet (25 mg) by mouth once daily.       tamsulosin 0.4 mg 24 hr capsule  Commonly known as: Flomax      Take 1 capsule (0.4 mg) by mouth once daily. Do not crush, chew, or split.       torsemide 40 mg tablet      Take 40 mg by mouth once daily.       triamcinolone 0.1 % cream  Commonly known as: Kenalog      Apply topically 2 times a day. Apply to affected area 1-2 times daily as needed.                 Where to Get Your Medications        These medications were sent to [facility]/pharmacy #[id number] [address] AT [place] [id number]      Phone: [phone]   apixaban 5 mg tablet            DETAILS OF HOSPITAL STAY    Presenting Problem  Patient Active Problem List    Diagnosis Date Noted    Presence of Watchman left atrial appendage closure device 07/14/2025    Atrial fibrillation, unspecified type (Multi) 07/14/2025    Chronic renal insufficiency 06/24/2025    Anticoagulant long-term use 06/24/2025    Anxiety 06/24/2025    Depression 06/24/2025     Hydronephrosis 06/24/2025    Pyelonephritis 06/20/2025    Atrial fibrillation (Multi) 12/31/2024    Abnormal chest xray 03/27/2024    Abnormal weight gain 03/27/2024    Asthmatic bronchitis (HHS-HCC) 03/27/2024    Dysmetabolic syndrome 03/27/2024    Edema 03/27/2024    Obesity 03/27/2024    Hyperlipidemia 03/27/2024    Hypothyroidism 03/27/2024    Insomnia 03/27/2024    Lymphedema 03/27/2024    Monoclonal gammopathy 03/27/2024    Murmur 03/27/2024    Fibromyalgia 03/27/2024    Osteoarthritis 03/27/2024    Prediabetes 03/27/2024    Herpes zoster 03/27/2024    Skin lesion 03/27/2024    SOB (shortness of breath) on exertion 03/27/2024    AMD (age related macular degeneration) 06/27/2023    Astigmatism of both eyes 06/27/2023    Dry eye syndrome of both eyes 06/27/2023    Epiretinal membrane, both eyes 06/27/2023    Pseudophakia of both eyes 06/27/2023    Benign essential HTN 09/19/2022    Obesity, Class II, BMI 35-39.9 10/09/2019    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 female with a PMH of AF and high bleeding risk. Pt presented on 7/18/25 for elective LAAO - Watchman procedure    Hospital Course     Millie Riley is now s/p LAAO - Watchman 27mm device via RVF x2 (Perclose device x2) on 7/18/25 with Dr. Filby.  Device deployed after position confirmed using fluoroscopy and ICE, anchor with a tug test, compression and seal tests.   "Procedure successful and uneventful.    Post-op course:  Vitals, rhythm, and physical assessment remained stable.  Right groin access sites without signs of active bleeding/hematoma.  Pt denies CP, SOB, abd pain, groin pain, new numbness/tingling in the extremities.   Pt ambulated after mandatory bedrest period without issue.  Post-op ECHO shows a stable EF and no significant change of the pericardial space.      Plan:  - \"Ok\" to d/c home today 7/18/2025 per Dr. Hermosillo  - Resumption of home DOAC Eliquis 2.5mg PO BID x3 months, then just 81mg ASA daily for life  - continue home medications otherwise   - follow up with PCP/Primary Cardiology in 2 weeks  - Repeat Watchman CTA in 4 months to reassess the device  - Watchman RN coordinator to follow-up per protocol      D/w Dr. Bay Rosario MD     Visit Vitals  /77   Pulse 90   Resp 16     Vitals:    07/18/25 1248   Weight: 116 kg (255 lb 11.7 oz)       Immunization History   Administered Date(s) Administered    COVID-19, mRNA, LNP-S, PF, 30 mcg/0.3 mL dose 03/26/2021, 04/19/2021    Pneumococcal conjugate vaccine, 13-valent (PREVNAR 13) 06/06/2016       Results        Pertinent Physical Exam At Time of Discharge  Physical Exam  AO x 3  CVS- normal s1, s2, no murmurs  Resp -CTAB  Abd- Soft, NT, ND  Neuro  No gross motor. Sensory deficits   Groin access sites no hematoma, swelling   No LE edema    Home Medications     Medication List      CHANGE how you take these medications     amiodarone 200 mg tablet; Commonly known as: Pacerone; Take 2 tablets   (400 mg) by mouth 2 times a day for 12 days, THEN 1 tablet (200 mg) once   daily.; Start taking on: June 27, 2025; What changed: See the new   instructions.   apixaban 5 mg tablet; Commonly known as: Eliquis; Take 0.5 tablets (2.5   mg) by mouth every 12 hours.; What changed: how much to take     CONTINUE taking these medications     ascorbic acid (vitamin C) 500 mg capsule   atorvastatin 40 mg tablet; Commonly " known as: Lipitor; Take 1 tablet (40   mg) by mouth once daily.   cholecalciferol 50 mcg (2,000 units) tablet; Commonly known as: Vitamin   D-3   Daily Multi-Vitamin tablet; Generic drug: multivitamin   dilTIAZem  mg 24 hr capsule; Commonly known as: Cardizem CD; Take   1 capsule (120 mg) by mouth 2 times a day.   DULoxetine 60 mg DR capsule; Commonly known as: Cymbalta; TAKE 1 CAPSULE   BY MOUTH ONCE DAILY.   levothyroxine 25 mcg tablet; Commonly known as: Synthroid, Levoxyl; TAKE   1 TABLET BY MOUTH EVERY DAY   spironolactone 25 mg tablet; Commonly known as: Aldactone; Take 1 tablet   (25 mg) by mouth once daily.   tamsulosin 0.4 mg 24 hr capsule; Commonly known as: Flomax; Take 1   capsule (0.4 mg) by mouth once daily. Do not crush, chew, or split.   torsemide 40 mg tablet; Take 40 mg by mouth once daily.   triamcinolone 0.1 % cream; Commonly known as: Kenalog; Apply topically 2   times a day. Apply to affected area 1-2 times daily as needed.       Outpatient Follow-Up  Future Appointments   Date Time Provider Department Center   7/21/2025  2:30 PM MD SHAMIKA Barrios [facility]   8/29/2025  4:00 PM Carl B Gillombardo, MD [facility]   10/17/2025 10:30 AM [facility] CT 1 [facility] Rad       Ana Rosario MD

## 2025-07-18 NOTE — POST-PROCEDURE NOTE
Physician Transition of Care Summary  Invasive Cardiovascular Lab    Procedure Date: 7/18/2025  Attending:    * Elmer Hermosillo - Primary  Resident/Fellow/Other Assistant: Surgeons and Role:     * Dameon Edmond MD - Fellow     * Ana Rosario MD - Fellow    Indications:   Pre-op Diagnosis      * Atrial fibrillation, unspecified type (Multi) [I48.91]     * Encounter for examination for normal comparison and control in clinical research program [Z00.6]    Post-procedure diagnosis:   Post-op Diagnosis     * Atrial fibrillation, unspecified type (Multi) [I48.91]     * Encounter for examination for normal comparison and control in clinical research program [Z00.6]    Procedure(s):   LAAO (Left Atrial Appendage Occlusion)  76276 - ME PERQ CLSR TCAT L ATR APNDGE W/ENDOCARDIAL IMPLNT    ME PERQ CLSR TCAT L ATR APNDGE W/ENDOCARDIAL IMPLNT [86832]    Description of the Procedure:   S/p REBECCA closure    Access: Dual right femoral vein access s/p 2 proglide closure devices.     Device: After confirmation of the device position on fluoroscopy and ICE, anchor with a tug test, compression and seal a 27 mm Watchman was successfully deployed without any immediate complications.     No effusion on ICE was present pre and post watchman deployment.     Complications:   None    Stents/Implants:   Implants          Other Cardiac Implant          Device, Closure, 27mm Watchman Flx Pro Laac - [id number] - Implanted        Inventory item: DEVICE, CLOSURE, 27MM WATCHMAN FLX PRO LAAC Model/Cat number: [id number]    : [employer] Lot number: [id number]    Device identifier: [id number]        GUDID Information       Request status Successful        Brand name: WATCHMAN FLX™ Pro Version/Model: [id number]    Company name: [employer] MRI safety info as of 7/18/25: MR Conditional    Contains dry or latex rubber: No      GMDN P.T. name: Cardiac defect occluder                As of 7/18/2025        Status: Implanted                              Anticoagulation/Antiplatelet Plan:   Eliquis 2.5mg PO BID for 3 months followed by ASA 81mg PO daily indefinitely    Estimated Blood Loss:   10 mL    Anesthesia: Moderate Sedation Anesthesia Staff: No anesthesia staff entered.    Any Specimen(s) Removed:   Order Name Source Comment Collection Info Order Time   TYPE AND SCREEN Blood, Venous  Collected By: Gabriel Flor RN 7/18/2025 12:48 PM     [facility]   Immediate            Disposition:   Recommendations:   Eliquis for 3 months followed by ASA for life   CT in 3 months  Access site monitoring.   TTE today  Likely discharge home today once recovery and monitoring period is complete.       Electronically signed by: Ana Rosario MD, 7/18/2025 2:25 PM

## 2025-07-19 DIAGNOSIS — M79.7 FIBROMYALGIA: ICD-10-CM

## 2025-07-21 ENCOUNTER — OFFICE VISIT (OUTPATIENT)
Dept: UROLOGY | Facility: HOSPITAL | Age: 80
End: 2025-07-21
Payer: MEDICARE

## 2025-07-21 DIAGNOSIS — Z79.01 ANTICOAGULANT LONG-TERM USE: ICD-10-CM

## 2025-07-21 DIAGNOSIS — N18.9 CHRONIC RENAL IMPAIRMENT, UNSPECIFIED CKD STAGE: ICD-10-CM

## 2025-07-21 DIAGNOSIS — R39.9 UTI SYMPTOMS: ICD-10-CM

## 2025-07-21 DIAGNOSIS — N20.0 KIDNEY STONE ON LEFT SIDE: Primary | ICD-10-CM

## 2025-07-21 LAB
ATRIAL RATE: 394 BPM
Q ONSET: 228 MS
QRS COUNT: 13 BEATS
QRS DURATION: 86 MS
QT INTERVAL: 372 MS
QTC CALCULATION(BAZETT): 434 MS
QTC FREDERICIA: 412 MS
R AXIS: -21 DEGREES
T AXIS: 71 DEGREES
T OFFSET: 414 MS
VENTRICULAR RATE: 82 BPM

## 2025-07-21 PROCEDURE — 1111F DSCHRG MED/CURRENT MED MERGE: CPT

## 2025-07-21 PROCEDURE — 99214 OFFICE O/P EST MOD 30 MIN: CPT

## 2025-07-21 PROCEDURE — G2211 COMPLEX E/M VISIT ADD ON: HCPCS

## 2025-07-21 PROCEDURE — 1159F MED LIST DOCD IN RCRD: CPT

## 2025-07-21 NOTE — PROGRESS NOTES
Chief complaint:  No chief complaint on file.  Referring physician:  No ref. 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   Surgical history:   has a past surgical history that includes Total knee arthroplasty (06/27/2013); US guided thyroid biopsy (03/10/2014); and Cystoscopy w/ ureteral stent placement (Left, 06/24/2025).  Family history:  family history is not on file.  Social history:   reports that she has never smoked. She has never used smokeless tobacco. She reports current alcohol use. She reports that she does not use drugs.    Medications:    Current Outpatient Medications   Medication Instructions    amiodarone (Pacerone) 200 mg tablet Take 2 tablets (400 mg) by mouth 2 times a day for 12 days, THEN 1 tablet (200 mg) once daily.    apixaban (ELIQUIS) 2.5 mg, oral, Every 12 hours    ascorbic acid, vitamin C, 500 mg capsule 1 capsule, Daily RT    atorvastatin (LIPITOR) 40 mg, oral, Daily    cholecalciferol (VITAMIN D-3) 2,000 Units, Daily RT    dilTIAZem CD (CARDIZEM CD) 120 mg, oral, 2 times daily    DULoxetine (CYMBALTA) 60 mg, oral, Daily    levothyroxine (SYNTHROID, LEVOXYL) 25 mcg, oral, Daily    multivitamin (Daily Multi-Vitamin) tablet 1 tablet, Daily    spironolactone (ALDACTONE) 25 mg, oral, Daily     tamsulosin (FLOMAX) 0.4 mg, oral, Daily, Do not crush, chew, or split.    torsemide 40 mg, oral, Daily    triamcinolone (Kenalog) 0.1 % cream Topical, 2 times daily, Apply to affected area 1-2 times daily as needed.      Allergies:    RX Allergies[1]     ROS:  14-point review of systems negative except as noted above.      HPI:  Millie Riley is a 80 y.o. female with a history of urolithiasis who presents for initial evaluation of left kidney infected stone  The patient was recently admitted due to an infected left pelvic stone in the context of anticoagulation. A pigtail catheter was placed, and later a Watchman device was inserted by cardiology, allowing for a reduction in the dose of anticoagulants. She now presents seeking a definitive solution for the stone  She does not have a follow-up urine culture and is currently asymptomatic.       CT ANGIO ABDOMEN PELVIS W AND/OR WO IV IV CONTRAST;  6/20/2025 7:19 pm          KIDNEYS, URETERS, BLADDER: There is a 0.6 cm nonobstructing right  renal calculus. There has been interval appearance of numerous new  left renal calculi and enlargement of the pre-existing renal pelvic  calculus. For example, a 2.1 cm x 1.1 cm renal pelvic calculus  previously measured 1.6 cm x 0.9 cm. There is a new 1 cm x 1.7 cm  renal pelvis calculus MX innumerable small calculi lying in the  pelvis and within the calices. There is moderate left hydronephrosis,  increased from prior study, as well as a delayed nephrogram. However,  dominant renal pelvic calculus does not appear to be resulting in  direct obstruction at the ureteral pelvic junction. There is interval  worsening of left perinephric fat stranding. 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       OBJECTIVE:  There were no vitals taken for this visit.There is no height or weight on file to calculate BMI.    Physical exam  General:  No acute  "distress  HEENT:  EOMI  CV:  Regular rate  Pulm:  Nonlabored respirations  Abd:  Soft, non-distended  :  No suprapubic or CVA tenderness  MSK:  No contractures  Neuro:  Motor intact  Psych:  Appropriate affect    Labs:    I have personally reviewed your lab tests  Lab Results   Component Value Date    WBC 6.8 06/27/2025    HGB 11.2 (L) 06/27/2025    HCT 33.3 (L) 06/27/2025     06/27/2025    ALT 29 06/20/2025    AST 23 06/20/2025     06/27/2025    K 4.3 06/27/2025     06/27/2025    CREATININE 1.13 (H) 06/27/2025    BUN 32 (H) 06/27/2025    CO2 23 06/27/2025    INR 1.3 (H) 06/20/2025    HGBA1C 5.8 (H) 06/25/2025     Urine Culture (no units)   Date Value   06/23/2025 No growth    No results found for: \"PSA\"    Imaging:    I have personally reviewed images    ASSESSMENT:   Millie Riley is a 80 y.o. female presenting with  left kidney stone  I explained the need not to delay stone surgery for too long due to the risk of infection and encrustation of the catheter. Ideally, the patient should be off anticoagulation, or at least have it suspended 3 days prior to the procedure. A new urine culture is now being requested.  She must obtain cardiac clearance prior to surgery  Symptoms: infection  previous stent: Yes previous procedure: no  stone characteristics: left kidney stone multiples (11 mm pylic stone) 500 HU  [employer] alternative: RIRS left side, suction access sheath  risk: infection, bledding  PLAN:  Left side RIRS  Problem List Items Addressed This Visit    None       Follow-up OR    Dilshad Wright MD    Problem List Items Addressed This Visit    None            [1] No Known Allergies    "

## 2025-07-22 LAB
ATRIAL RATE: 101 BPM
ATRIAL RATE: 104 BPM
Q ONSET: 210 MS
Q ONSET: 225 MS
QRS COUNT: 15 BEATS
QRS COUNT: 16 BEATS
QRS DURATION: 92 MS
QRS DURATION: 98 MS
QT INTERVAL: 354 MS
QT INTERVAL: 368 MS
QTC CALCULATION(BAZETT): 450 MS
QTC CALCULATION(BAZETT): 456 MS
QTC FREDERICIA: 420 MS
QTC FREDERICIA: 421 MS
R AXIS: -13 DEGREES
R AXIS: -24 DEGREES
T AXIS: 79 DEGREES
T AXIS: 83 DEGREES
T OFFSET: 387 MS
T OFFSET: 409 MS
VENTRICULAR RATE: 100 BPM
VENTRICULAR RATE: 90 BPM

## 2025-07-22 RX ORDER — DULOXETIN HYDROCHLORIDE 60 MG/1
60 CAPSULE, DELAYED RELEASE ORAL DAILY
Qty: 90 CAPSULE | Refills: 0 | Status: SHIPPED | OUTPATIENT
Start: 2025-07-22

## 2025-07-23 RX ORDER — CEFAZOLIN SODIUM 2 G/100ML
2 INJECTION, SOLUTION INTRAVENOUS ONCE
OUTPATIENT
Start: 2025-07-23 | End: 2025-07-23

## 2025-07-24 ENCOUNTER — PATIENT OUTREACH (OUTPATIENT)
Dept: PRIMARY CARE | Facility: CLINIC | Age: 80
End: 2025-07-24
Payer: MEDICARE

## 2025-07-24 ENCOUNTER — TELEPHONE (OUTPATIENT)
Dept: UROLOGY | Facility: HOSPITAL | Age: 80
End: 2025-07-24
Payer: MEDICARE

## 2025-07-24 LAB — BACTERIA UR CULT: ABNORMAL

## 2025-07-24 NOTE — PROGRESS NOTES
Confirmation of at least 2 patient identifiers.    Completed telephonic follow-up with patient after recent visit with Dr. Cruz.   Spoke to patient during outreach call.    Patient reports feeling: Back to baseline. Patient stated she is doing very well. She had a Watchman Device placed and is preparing to have her urinary stent removed next month.     Patient has questions or concerns about medications: No    Have all prescribed medications been filled? Yes    Patient has necessary resources to manage their care? Yes    Patient has questions or concerns? No    Next care management follow-up approximately within one month.  Care  information provided to patient.

## 2025-07-25 LAB
ATRIAL RATE: 394 BPM
Q ONSET: 210 MS
QRS COUNT: 15 BEATS
QRS DURATION: 92 MS
QT INTERVAL: 380 MS
QTC CALCULATION(BAZETT): 462 MS
QTC FREDERICIA: 433 MS
R AXIS: -27 DEGREES
T AXIS: 80 DEGREES
T OFFSET: 400 MS
VENTRICULAR RATE: 89 BPM

## 2025-07-28 DIAGNOSIS — N12 PYELONEPHRITIS: ICD-10-CM

## 2025-07-28 DIAGNOSIS — N20.0 KIDNEY STONE ON LEFT SIDE: ICD-10-CM

## 2025-07-28 RX ORDER — TAMSULOSIN HYDROCHLORIDE 0.4 MG/1
0.4 CAPSULE ORAL DAILY
Qty: 90 CAPSULE | Refills: 0 | Status: SHIPPED | OUTPATIENT
Start: 2025-07-28 | End: 2025-08-27

## 2025-08-01 ENCOUNTER — APPOINTMENT (OUTPATIENT)
Dept: CARDIOLOGY | Facility: HOSPITAL | Age: 80
End: 2025-08-01
Payer: MEDICARE

## 2025-08-06 ENCOUNTER — PRE-ADMISSION TESTING (OUTPATIENT)
Dept: PREADMISSION TESTING | Facility: HOSPITAL | Age: 80
End: 2025-08-06
Payer: MEDICARE

## 2025-08-06 VITALS
OXYGEN SATURATION: 95 % | RESPIRATION RATE: 20 BRPM | HEIGHT: 65 IN | DIASTOLIC BLOOD PRESSURE: 74 MMHG | TEMPERATURE: 98.6 F | WEIGHT: 254.8 LBS | HEART RATE: 105 BPM | SYSTOLIC BLOOD PRESSURE: 113 MMHG | BODY MASS INDEX: 42.45 KG/M2

## 2025-08-06 PROCEDURE — 99204 OFFICE O/P NEW MOD 45 MIN: CPT | Performed by: NURSE PRACTITIONER

## 2025-08-06 ASSESSMENT — DUKE ACTIVITY SCORE INDEX (DASI)
CAN YOU DO LIGHT WORK AROUND THE HOUSE LIKE DUSTING OR WASHING DISHES: YES
DASI METS SCORE: 5
CAN YOU DO MODERATE WORK AROUND THE HOUSE LIKE VACUUMING, SWEEPING FLOORS OR CARRYING GROCERIES: YES
CAN YOU WALK A BLOCK OR TWO ON LEVEL GROUND: YES
CAN YOU PARTICIPATE IN MODERATE RECREATIONAL ACTIVITIES LIKE GOLF, BOWLING, DANCING, DOUBLES TENNIS OR THROWING A BASEBALL OR FOOTBALL: NO
TOTAL_SCORE: 18.7
CAN YOU CLIMB A FLIGHT OF STAIRS OR WALK UP A HILL: NO
CAN YOU DO YARD WORK LIKE RAKING LEAVES, WEEDING OR PUSHING A MOWER: NO
CAN YOU WALK INDOORS, SUCH AS AROUND YOUR HOUSE: YES
CAN YOU RUN A SHORT DISTANCE: NO
CAN YOU HAVE SEXUAL RELATIONS: YES
CAN YOU PARTICIPATE IN STRENOUS SPORTS LIKE SWIMMING, SINGLES TENNIS, FOOTBALL, BASKETBALL, OR SKIING: NO
CAN YOU TAKE CARE OF YOURSELF (EAT, DRESS, BATHE, OR USE TOILET): YES
CAN YOU DO HEAVY WORK AROUND THE HOUSE LIKE SCRUBBING FLOORS OR LIFTING AND MOVING HEAVY FURNITURE: NO

## 2025-08-06 ASSESSMENT — PAIN - FUNCTIONAL ASSESSMENT: PAIN_FUNCTIONAL_ASSESSMENT: 0-10

## 2025-08-06 ASSESSMENT — PAIN SCALES - GENERAL: PAINLEVEL_OUTOF10: 0 - NO PAIN

## 2025-08-06 NOTE — PREPROCEDURE INSTRUCTIONS
Medication List            Accurate as of August 6, 2025  4:10 PM. Always use your most recent med list.                amiodarone 200 mg tablet  Commonly known as: Pacerone  Take 2 tablets (400 mg) by mouth 2 times a day for 12 days, THEN 1 tablet (200 mg) once daily.  Start taking on: June 27, 2025  Medication Adjustments for Surgery: Take/Use as prescribed     apixaban 5 mg tablet  Commonly known as: Eliquis  Take 0.5 tablets (2.5 mg) by mouth every 12 hours.  Additional Medication Adjustments for Surgery: Other (Comment)  Notes to patient: Will need instructions from your provider     ascorbic acid (vitamin C) 500 mg capsule  Additional Medication Adjustments for Surgery: Take last dose 7 days before surgery  Notes to patient: last dose preoperatively on 8/14/25     atorvastatin 40 mg tablet  Commonly known as: Lipitor  Take 1 tablet (40 mg) by mouth once daily.  Medication Adjustments for Surgery: Take/Use as prescribed     cholecalciferol 50 mcg (2,000 units) tablet  Commonly known as: Vitamin D-3  Additional Medication Adjustments for Surgery: Take last dose 7 days before surgery  Notes to patient: last dose preoperatively on 8/14/25     Daily Multi-Vitamin tablet  Generic drug: multivitamin  Additional Medication Adjustments for Surgery: Take last dose 7 days before surgery  Notes to patient: last dose preoperatively on 8/14/25     dilTIAZem  mg 24 hr capsule  Commonly known as: Cardizem CD  Take 1 capsule (120 mg) by mouth 2 times a day.  Medication Adjustments for Surgery: Take/Use as prescribed     DULoxetine 60 mg DR capsule  Commonly known as: Cymbalta  TAKE 1 CAPSULE BY MOUTH ONCE DAILY.  Medication Adjustments for Surgery: Take/Use as prescribed     levothyroxine 25 mcg tablet  Commonly known as: Synthroid, Levoxyl  TAKE 1 TABLET BY MOUTH EVERY DAY  Medication Adjustments for Surgery: Take/Use as prescribed     spironolactone 25 mg tablet  Commonly known as: Aldactone  Take 1 tablet (25 mg)  by mouth once daily.  Medication Adjustments for Surgery: Take/Use as prescribed     tamsulosin 0.4 mg 24 hr capsule  Commonly known as: Flomax  Take 1 capsule (0.4 mg) by mouth once daily. Do not crush, chew, or split.  Medication Adjustments for Surgery: Take/Use as prescribed     torsemide 40 mg tablet  Take 40 mg by mouth once daily.  Medication Adjustments for Surgery: Take/Use as prescribed     triamcinolone 0.1 % cream  Commonly known as: Kenalog  Apply topically 2 times a day. Apply to affected area 1-2 times daily as needed.  Medication Adjustments for Surgery: Do Not take on the morning of surgery            NPO Instructions:     Do not eat any food after midnight the night before your surgery/procedure.  You may have clear liquids until TWO hours before surgery/procedure. This includes water, black tea/coffee, (no milk or cream) apple juice and electrolyte drinks (Gatorade).  You may chew gum up to TWO hours before your surgery/procedure.     Additional Instructions:      Seven/Six Days before Surgery:  Review your medication instructions, stop indicated medications  Five Days before Surgery:  Review your medication instructions, stop indicated medications  Three Days before Surgery:  Review your medication instructions, stop indicated medications  The Day before Surgery:  No smoking or alcohol use 24 hours before surgery  Review your medication instructions, stop indicated medications  You will be contacted regarding the time of your arrival to facility and surgery time  Do not eat any food after Midnight  Day of Surgery:  Review your medication instructions, take indicated medications  If you have diabetes, please check your fasting blood sugar upon awakening.  If fasting blood sugar is <80 mg/dl, drink 100 ml of apple juice, time limit of 2 hours before  You may have clear liquids until TWO hours before surgery/procedure.  This includes water, black tea/coffee, (no milk or cream) apple juice and  electrolyte drinks (Gatorade)  You may chew gum up to TWO hours before your surgery/procedure  Wear  comfortable loose fitting clothing  Do not use moisturizers, creams, lotions or perfume  All jewelry and valuables should be left at home     CONTACT SURGEON'S OFFICE IF YOU DEVELOP:  * Fever = 100.4 F   * New respiratory symptoms (e.g. cough, shortness of breath, respiratory distress, sore throat)  * Recent loss of taste or smell  *Flu like symptoms such as headache, fatigue or gastrointestinal symptoms  * You develop any open sores, shingles, burning or painful urination   AND/OR:  * You no longer wish to have the surgery.  * Any other personal circumstances change that may lead to the need to cancel or defer this surgery.  *You were admitted to any hospital within one week of your planned procedure.     SMOKING:  *Quitting smoking can make a huge difference to your health and recovery from surgery.    *If you need help with quitting, call 3-325-QUIT-NOW.     THE DAY BEFORE SURGERY:  *Do not eat any food after midnight the night before your surgery.   *You may have up to TEN OUNCES of clear liquids until TWO hours before surgery/procedure.. This includes water, black tea/coffee, (no milk or cream) apple juice, and electrolyte drinks (Gatorade). Please avoid clear liquids that are red in color.   *You may chew gum/mints up to TWO hours before your surgery/procedure.     SURGICAL TIME:  *You will be contacted between 2 p.m. and 3 p.m. the business day before your surgery with your arrival time.  *If you haven't received a call by 3pm, call [phone]  *Scheduled surgery times may change and you will be notified if this occurs-check your personal voicemail for any updates.     ON THE MORNING OF SURGERY:  *Wear comfortable, loose fitting clothing.   *Do not use moisturizers, creams, lotions or perfume.  *All jewelry and valuables should be left at home.  *Prosthetic devices such as contact lenses, hearing aids,  dentures, eyelash extensions, hairpins and body piercing must be removed before surgery.     BRING WITH YOU:  *Photo ID and insurance card  *Current list of medications and allergies  *Pacemaker/Defibrillator/Heart stent cards  *CPAP machine and mask  *Slings/splints/crutches  *Copy of your complete Advanced Directive/DHPOA-if applicable  *Neurostimulator implant remote     PARKING AND ARRIVAL:  *Check in at the Main Entrance desk and let them know you are here for surgery.     IF YOU ARE HAVING OUTPATIENT/SAME DAY SURGERY:  *A responsible adult MUST accompany you at the time of discharge and stay with you for 24 hours after your surgery.  *You may NOT drive yourself home after surgery.  *You may use a taxi or ride sharing service ([employer], [employer]) to return home ONLY if you are accompanied by a friend or family member.  *Instructions for resuming your medications will be provided by your surgeon.     Thank you for coming to Pre Admission testing.      If I have prescribed medication please don't forget to  at your pharmacy.      Any questions about today's visit call [phone] and leave a message in the general mailbox.     Patient instructed to ambulate as soon as possible postoperatively to decrease thromboembolic risk.      Thank you for visiting [facility].  If you have any changes to your health condition, please call the surgeons office to alert them and give them details of your symptoms.        Preoperative Fasting Guidelines     Why must I stop eating and drinking near surgery time?  With sedation, food or liquid in your stomach can enter your lungs causing serious complications  Increases nausea and vomiting     When do I need to stop eating and drinking before my surgery?  Do not eat any food after midnight the night before your surgery/procedure.  You may have up to TEN ounces of clear liquid until TWO hours before your surgery/procedure.  This includes water, black  tea/coffee, (no milk or cream) apple juice, and electrolyte drinks (Gatorade)  You may chew gum until TWO hours before your surgery/procedure        Additional Instructions:      The Day before Surgery:  -Review your medication instructions, stop indicated medications  -You will be contacted in the evening regarding the time of your arrival to facility and surgery time     Day of Surgery:  -Review your medication instructions, take indicated medications  -Wear comfortable loose fitting clothing  -Do not use moisturizers, creams, lotions or perfume  -All jewelry and valuables should be left at home                   Preoperative Brain Exercises     What are brain exercises?  A brain exercise is any activity that engages your thinking (cognitive) skills.     What types of activities are considered brain exercises?  Jigsaw puzzles, crossword puzzles, word jumble, memory games, word search, and many more.  Many can be found free online or on your phone via a mobile michael.     Why should I do brain exercises before my surgery?  More recent research has shown brain exercise before surgery can lower the risk of postoperative delirium (confusion) which can be especially important for older adults.  Patients who did brain exercises for 5 to 10 minutes/day in the days before surgery, cut their risk of postoperative delirium in half up to 1 week after surgery.                         [facility]     Preoperative Deep Breathing Exercises     Why it is important to do deep breathing exercises before my surgery?  Deep breathing exercises strengthen your breathing muscles.  This helps you to recover after your surgery and decreases the chance of breathing complications.        How are the deep breathing exercises done?  Sit straight with your back supported.  Breathe in deeply and slowly through your nose. Your lower rib cage should expand and your abdomen may move forward.  Hold that breath for 3 to 5  seconds.  Breathe out through pursed lips, slowly and completely.  Rest and repeat 10 times every hour while awake.  Rest longer if you become dizzy or lightheaded.                      [facility]     Preoperative Deep Breathing Exercises     Why it is important to do deep breathing exercises before my surgery?  Deep breathing exercises strengthen your breathing muscles.  This helps you to recover after your surgery and decreases the chance of breathing complications.        How are the deep breathing exercises done?  Sit straight with your back supported.  Breathe in deeply and slowly through your nose. Your lower rib cage should expand and your abdomen may move forward.  Hold that breath for 3 to 5 seconds.  Breathe out through pursed lips, slowly and completely.  Rest and repeat 10 times every hour while awake.  Rest longer if you become dizzy or lightheaded.        Patient Information: Incentive Spirometer  What is an incentive spirometer?  An incentive spirometer is a device used before and after surgery to “exercise” your lungs.  It helps you to take deeper breaths to expand your lungs.  Below is an example of a basic incentive spirometer.  The device you receive may differ slightly but they all function the same.    Why do I need to use an incentive spirometer?  Using your incentive spirometer prepares your lungs for surgery and helps prevent lung problems after surgery.  How do I use my incentive spirometer?  When you're using your incentive spirometer, make sure to breathe through your mouth. If you breathe through your nose, the incentive spirometer won't work properly. You can hold your nose if you have trouble.  If you feel dizzy at any time, stop and rest. Try again at a later time.  Follow the steps below:  Set up your incentive spirometer, expand the flexible tubing and connect to the outlet.  Sit upright in a chair or bed. Hold the incentive spirometer at eye level.   Put  the mouthpiece in your mouth and close your lips tightly around it. Slowly breathe out (exhale) completely.  Breathe in (inhale) slowly through your mouth as deeply as you can. As you take a breath, you will see the piston rise inside the large column. While the piston rises, the indicator should move upwards. It should stay in between the 2 arrows (see Figure).  Try to get the piston as high as you can, while keeping the indicator between the arrows.   If the indicator doesn't stay between the arrows, you're breathing either too fast or too slow.  When you get it as high as you can, hold your breath for 10 seconds, or as long as possible. While you're holding your breath, the piston will slowly fall to the base of the spirometer.  Once the piston reaches the bottom of the spirometer, breathe out slowly through your mouth. Rest for a few seconds.  Repeat 10 times. Try to get the piston to the same level with each breath.  Repeat every hour while awake  You can carefully clean the outside of the mouthpiece with an alcohol wipe or soap and water.       Patient and Family Education             Ways You Can Help Prevent Blood Clots                    This handout explains some simple things you can do to help prevent blood clots.      Blood clots are blockages that can form in the body's veins. When a blood clot forms in your deep veins, it may be called a deep vein thrombosis, or DVT for short. Blood clots can happen in any part of the body where blood flows, but they are most common in the arms and legs. If a piece of a blood clot breaks free and travels to the lungs, it is called a pulmonary embolus (PE). A PE can be a very serious problem.         Being in the hospital or having surgery can raise your chances of getting a blood clot because you may not be well enough to move around as much as you normally do.         Ways you can help prevent blood clots in the hospital           Wearing SCDs. SCDs stands for  Sequential Compression Devices.   SCDs are special sleeves that wrap around your legs  They attach to a pump that fills them with air to gently squeeze your legs every few minutes.   This helps return the blood in your legs to your heart.   SCDs should only be taken off when walking or bathing.   SCDs may not be comfortable, but they can help save your life.                                            Wearing compression stockings - if your doctor orders them. These special snug fitting stockings gently squeeze your legs to help blood flow.       Walking. Walking helps move the blood in your legs.   If your doctor says it is ok, try walking the halls at least   5 times a day. Ask us to help you get up, so you don't fall.      Taking any blood thinning medicines your doctor orders.        Page 1 of 2            [facility]; 3/23   Ways you can help prevent blood clots at home         Wearing compression stockings - if your doctor orders them. ? Walking - to help move the blood in your legs.       Taking any blood thinning medicines your doctor orders.      Signs of a blood clot or PE        Tell your doctor or nurse know right away if you have of the problems listed below.    If you are at home, seek medical care right away. Call 911 for chest pain or problems breathing.                Signs of a blood clot (DVT) - such as pain,  swelling, redness or warmth in your arm or leg      Signs of a pulmonary embolism (PE) - such as chest pain or feeling short of breath    Preoperative Clearances  -If you are informed by the preadmission testing team that you need clearance for your surgery, please reach out to the provider in question to assisting accommodating obtaining your clearance.   -Please have you provider fax your clearance letter to [phone] if needed.   -A blank clearance letter will be provided to you if indicated.     Anticoagulation  -If you are on oral anticoagulation such as Coumadin, Eliquis,  Xarelto, Plavix, Brilinta, Pradaxa; we will need to obtain preoperative anticoagulation instructions from the prescribing provider.   -We will reach out to the prescriber but do encourage you to call their office as well as it will increase the chances of getting the necessary information.   -We will contact you with the instruction once we obtain them.

## 2025-08-06 NOTE — CPM/PAT H&P
CPM/PAT Evaluation       Name: Millie Riley (Millie Riley)  /Age: 1945/80 y.o.     In-Person       Chief Complaint: Kidney stone on left side     HPI  Patient is an alert and oriented 80 year old female scheduled for a  LITHOTRIPSY, CALCULUS, URETER, USING LASER on 25 with Dr. Wright for  Kidney stone on left side . PMHX includes watchman, CKD, anxiety/depression, AFIB, pyelonephritis, HLD, hypothyroidism, fibromyalgia, HTN. Presents to [facility] PAT today for perioperative risk stratification and optimization.     Medical History[1]    Surgical History[2]    Patient Sexual activity questions deferred to the physician.    Family History[3]    Allergies[4]    Prior to Admission medications   Medication Sig Start Date End Date Taking? Authorizing Provider   amiodarone (Pacerone) 200 mg tablet Take 2 tablets (400 mg) by mouth 2 times a day for 12 days, THEN 1 tablet (200 mg) once daily.  Patient taking differently: Takes #1 tablet daily 25 Yes Etta Arciniega PA-C   apixaban (Eliquis) 5 mg tablet Take 0.5 tablets (2.5 mg) by mouth every 12 hours. 7/18/25 10/16/25 Yes Ana Rosario MD   ascorbic acid, vitamin C, 500 mg capsule Take 1 capsule by mouth once daily.   Yes Historical Provider, MD   atorvastatin (Lipitor) 40 mg tablet Take 1 tablet (40 mg) by mouth once daily. 3/3/25 3/3/26 Yes Carl B Gillombardo, MD   cholecalciferol (Vitamin D-3) 50 MCG (2000 UT) tablet Take 1 tablet (2,000 Units) by mouth once daily.   Yes Historical Provider, MD   dilTIAZem CD (Cardizem CD) 120 mg 24 hr capsule Take 1 capsule (120 mg) by mouth 2 times a day. 25  Yes Etta Arciniega PA-C   DULoxetine (Cymbalta) 60 mg DR capsule TAKE 1 CAPSULE BY MOUTH ONCE DAILY. 25  Yes Azalia Way MD   levothyroxine (Synthroid, Levoxyl) 25 mcg tablet TAKE 1 TABLET BY MOUTH EVERY DAY 6/3/24  Yes Azalia Way MD   multivitamin (Daily Multi-Vitamin) tablet Take 1 tablet by mouth once daily.    "Yes Luanne Griggs MD   spironolactone (Aldactone) 25 mg tablet Take 1 tablet (25 mg) by mouth once daily. 3/3/25 3/3/26 Yes Carl B Gillombardo, MD   tamsulosin (Flomax) 0.4 mg 24 hr capsule Take 1 capsule (0.4 mg) by mouth once daily. Do not crush, chew, or split. 7/28/25 8/27/25 Yes Azalia Way MD   torsemide 40 mg tablet Take 40 mg by mouth once daily.  Patient taking differently: Take 20 mg by mouth once daily. Takes #1 tablet of torsemide 20mg once daily 4/9/25 4/9/26 Yes Carl B Gillombardo, MD   triamcinolone (Kenalog) 0.1 % cream Apply topically 2 times a day. Apply to affected area 1-2 times daily as needed. 6/19/25  Yes Azalia Way MD          Visit Vitals  /74   Pulse 105   Temp 37 °C (98.6 °F) (Temporal)   Resp 20   Ht 1.651 m (5' 5\")   Wt 116 kg (254 lb 12.8 oz)   SpO2 95%   BMI 42.40 kg/m²   Smoking Status Never   BSA 2.31 m²      Review of Systems   Constitutional: Negative for chills, decreased appetite, diaphoresis, fever and malaise/fatigue.   Eyes:  Negative for blurred vision and double vision.   Cardiovascular:  Negative for chest pain, claudication, cyanosis, dyspnea on exertion, irregular heartbeat, leg swelling, near-syncope and palpitations.   Respiratory:  Negative for cough, hemoptysis, shortness of breath and wheezing.    Endocrine: Negative for cold intolerance, heat intolerance, polydipsia, polyphagia and polyuria.   Gastrointestinal:  Negative for abdominal pain, constipation, diarrhea, dysphagia, nausea and vomiting.   Genitourinary:  Negative for bladder incontinence, dysuria, hematuria, incomplete emptying, nocturia, frequency, pelvic pain and urgency.   Neurological:  Negative for headaches, light-headedness, paresthesias, sensory change and weakness.   Psychiatric/Behavioral:  Negative for altered mental status.    Musculoskeletal: Negative for myalgias, arthralgias     Vitals and nursing note reviewed.     Physical exam  Constitutional:       " Appearance: Normal appearance. She is Morbidly Obese.   HENT:      Head: Normocephalic and atraumatic.      Mouth/Throat:      Mouth: Mucous membranes are moist.      Pharynx: Oropharynx is clear.   Eyes:      Extraocular Movements: Extraocular movements intact.      Conjunctiva/sclera: Conjunctivae normal.      Pupils: Pupils are equal, round, and reactive to light.   Cardiovascular:      PMI at left midclavicular line. Normal rate. Regular rhythm. Normal S1. Normal S2.       Murmurs: There is no murmur.      No gallop.  No click. No rub.       No audible carotid bruit     No lower extremity edema on exam  Pulmonary:      Effort: Pulmonary effort is normal.      Breath sounds: Normal breath sounds.   Abdominal:      General: Abdomen is flat. Bowel sounds are normal.      Palpations: Abdomen is soft and non-tender  Musculoskeletal:      Cervical back: Normal range of motion and neck supple.   Skin:     General: Skin is warm and dry.      Capillary Refill: Capillary refill takes less than 2 seconds.   Neurological:      General: No focal deficit present.      Mental Status: She is alert and oriented to person, place, and time. Mental status is at baseline.   Psychiatric:         Mood and Affect: Mood normal.         Behavior: Behavior normal.         Thought Content: Thought content normal.         Judgment: Judgment normal.     Vitals and nursing note reviewed. Physical exam within normal limits.     DASI Risk Score      Flowsheet Row Pre-Admission Testing from 8/6/20[facility] in [facility]   Can you take care of yourself (eat, dress, bathe, or use toilet)?  2.75 filed at 08/06/2025 1640   Can you walk indoors, such as around your house? 1.75 filed at 08/06/2025 1640   Can you walk a block or two on level ground?  2.75 filed at 08/06/2025 1640   Can you climb a flight of stairs or walk up a hill? 0 filed at 08/06/2025 1640   Can you run a short distance? 0 filed at 08/06/2025 1640   Can you do light work  around the house like dusting or washing dishes? 2.7 filed at 08/06/2025 1640   Can you do moderate work around the house like vacuuming, sweeping floors or carrying groceries? 3.5 filed at 08/06/2025 1640   Can you do heavy work around the house like scrubbing floors or lifting and moving heavy furniture?  0 filed at 08/06/2025 1640   Can you do yard work like raking leaves, weeding or pushing a mower? 0 filed at 08/06/2025 1640   Can you have sexual relations? 5.25 filed at 08/06/2025 1640   Can you participate in moderate recreational activities like golf, bowling, dancing, doubles tennis or throwing a baseball or football? 0 filed at 08/06/2025 1640   Can you participate in strenous sports like swimming, singles tennis, football, basketball, or skiing? 0 filed at 08/06/2025 1640   DASI SCORE 18.7 filed at 08/06/2025 1640   METS Score (Will be calculated only when all the questions are answered) 5 filed at 08/06/2025 1640     Caprini DVT Assessment      Flowsheet Row Pre-Admission Testing from 8/6/2025 in [facility] Admission (Discharged) from 7/18/2025 in [facility] with Elmer Hermosillo MD   DVT Score (IF A SCORE IS NOT CALCULATING, MUST SELECT A BMI TO COMPLETE) 9 filed at 08/06/2025 1639 11 filed at 07/14/2025 1250   Medical Factors Swollen legs filed at 08/06/2025 1639 Central venous access filed at 07/14/2025 1250   Surgical Factors Major surgery planned, including arthroscopic and laproscopic (1-2 hours) filed at 08/06/2025 1639 Major surgery planned, lasting 2-3 hours filed at 07/14/2025 1250   BMI (BMI MUST BE CHOSEN) 41-50 (Morbid obesity) filed at 08/06/2025 1639 31-40 (Obesity) filed at 07/14/2025 1250     Modified Frailty Index    No data to display  [id number] Stroke Risk Points  Current as of 14 minutes ago        5 0 to 9 Points:      No Change          The [id number] risk score (Alda PATTERSON, et al. 2009. © 2010 [employer])  quantifies the risk of stroke for a patient with atrial fibrillation. For patients without atrial fibrillation or under the age of 18 this score appears as N/A. Higher score values generally indicate higher risk of stroke.          Points Metrics   1 Has Congestive Heart Failure:  Yes     Patients with congestive heart failure get 1 point.    Current as of 14 minutes ago   1 Has Hypertension:  Yes     Patients with hypertension get 1 point.    Current as of 14 minutes ago   2 Age:  80     Patients 65 to 74 years old get 1 point, or patients 75 years and older get 2 points.    Current as of 14 minutes ago   0 Has Diabetes:  No     Patients with diabetes get 1 point.    Current as of 14 minutes ago   0 Had Stroke:  No  Had TIA:  No  Had Thromboembolism:  No     Patients who have had a stroke, TIA, or thromboembolism get 2 points.    Current as of 14 minutes ago   0 Has Vascular Disease:  No     Patients with vascular disease get 1 point.    Current as of 14 minutes ago   1 Clinically Relevant Sex:  Female     Patients with a clinically relevant sex of Female get 1 point.    Current as of 14 minutes ago             Revised Cardiac Risk Index      Flowsheet Row Pre-Admission Testing from 8/6/2025 in [facility]   High-Risk Surgery (Intraperitoneal, Intrathoracic,Suprainguinal vascular) 0 filed at 08/06/2025 1640   History of ischemic heart disease (History of MI, History of positive exercuse test, Current chest paint considered due to myocardial ischemia, Use of nitrate therapy, ECG with pathological Q Waves) 0 filed at 08/06/2025 1640   History of congestive heart failure (pulmonary edemia, bilateral rales or S3 gallop, Paroxysmal nocturnal dyspnea, CXR showing pulmonary vascular redistribution) 0 filed at 08/06/2025 1640   History of cerebrovascular disease (Prior TIA or stroke) 0 filed at 08/06/2025 1640   Pre-operative insulin treatment 0 filed at 08/06/2025 1640   Pre-operative creatinine>2 mg/dl 0  filed at 08/06/2025 1640   Revised Cardiac Risk Calculator 0 filed at 08/06/2025 1640     Apfel Simplified Score    No data to display  Risk Analysis Index Results This Encounter    No data found in the last 10 encounters.       Stop Bang Score      Flowsheet Row Pre-Admission Testing from 8/6/2025 in [facility]   Do you snore loudly? 0 filed at 08/06/2025 1556   Do you often feel tired or fatigued after your sleep? 0 filed at 08/06/2025 1556   Has anyone ever observed you stop breathing in your sleep? 0 filed at 08/06/2025 1556   Do you have or are you being treated for high blood pressure? 1 filed at 08/06/2025 1556   Recent BMI (Calculated) 42.4 filed at 08/06/2025 1556   Is BMI greater than 35 kg/m2? 1=Yes filed at 08/06/2025 1556   Age older than 50 years old? 1=Yes filed at 08/06/2025 1556   Is your neck circumference greater than 17 inches (Male) or 16 inches (Female)? 1 filed at 08/06/2025 1556   Gender - Male 0=No filed at 08/06/2025 1556   STOP-BANG Total Score 4 filed at 08/06/2025 1556     Prodigy: High Risk  Total Score: 16              Prodigy Age Score           ARISCAT Score for Postoperative Pulmonary Complications      Flowsheet Row Pre-Admission Testing from 8/6/2025 in [facility]   Age Calculated Score 3 filed at 08/06/2025 1641   Preoperative SpO2 0 filed at 08/06/2025 1641   Respiratory infection in the last month Either upper or lower (i.e., URI, bronchitis, pneumonia), with fever and antibiotic treatment 0 filed at 08/06/2025 1641   Preoperative anemia (Hgb less than 10 g/dl) 0 filed at 08/06/2025 1641   Surgical incision  0 filed at 08/06/2025 1641   Duration of surgery  0 filed at 08/06/2025 1641   Emergency Procedure  0 filed at 08/06/2025 1641   ARISCAT Total Score  3 filed at 08/06/2025 1641     Ren Perioperative Risk for Myocardial Infarction or Cardiac Arrest (OBIE)    No data to display    Assessment & Plan:    Neuro:  Anxiety/depression on  duloxetine    HEENT/Airway:  No diagnosis or significant findings on chart review or clinical presentation and evaluation.   STOP-BANG Score-4 points low risk for OG    Mallampati::  II    TM distance::  >3 FB    Neck ROM::  Full  Dentures-denies  Crowns-denies  Implants-denies    Cardiovascular:  HTN, HLD on atorvastatin, (also has chronic bilateral edema uses spironolactone, torsemide)   AFIB recent watchman procedure  on amiodarone, apixaban,   METS: 5  RCRI: 0 points, 3.9%  risk for postoperative MACE   OBIE: 0.29% risk for postoperative MACE  EKG -Media Information        ECHO 7/18/25  PHYSICIAN INTERPRETATION:  Left Ventricle: The left ventricular systolic function is normal with a visually estimated ejection fraction of 60-65%. There are no regional left ventricular wall motion abnormalities. The left ventricular cavity size is normal. Left ventricular diastolic filling was not assessed.  Left Atrium: The left atrial size was not assessed.  Right Ventricle: The right ventricle is normal in size. There is normal right ventricular global systolic function.  Right Atrium: The right atrial size was not well visualized.  Aortic Valve: The aortic valve is probably trileaflet. There is moderate aortic valve cusp calcification. Aortic valve regurgitation was not assessed.  Mitral Valve: The mitral valve is normal in structure. There is mild mitral annular calcification. Mitral valve regurgitation was not assessed.  Tricuspid Valve: The tricuspid valve is structurally normal. Tricuspid regurgitation was not assessed.  Pulmonic Valve: The pulmonic valve is not well visualized. Pulmonic valve regurgitation was not assessed.  Pericardium: Trivial pericardial effusion. There is an anterior clear space.  Aorta: The aortic root is normal.  Systemic Veins: The inferior vena cava appears normal in size, with IVC inspiratory collapse greater than 50%.  In comparison to the previous echocardiogram(s): Compared with study dated  1/2/2025, the patient is now s/p REBECCA occlusion device. Otherwise, no significant change.      CONCLUSIONS:   1. The left ventricular systolic function is normal with a visually estimated ejection fraction of 60-65%.   2. There is normal right ventricular global systolic function.   3. There is moderate aortic valve cusp calcification.   4. Compared with study dated 1/2/2025, the patient is now s/p REBECCA occlusion device. Otherwise, no significant change.     Pulmonary:  Asthmatic bronchitis  SOB on exertion   ARISCAT: <26 points, 1.6% risk of in-hospital postoperative pulmonary complication  PRODIGY: Moderate risk for opioid induced respiratory depression  Smoking History-she quit smoking >40 years ago   Deep breathing handout given    Renal/Urinary:   Pyelonephritis on tamsulosin  CKD stage III   the patient is at increased risk of perioperative renal complications secondary to HTN. Preventative measures include BP monitoring, medication compliance, and hydration management.   CMP-Pending  Creatinine-  GFR-    Endocrine:  Hypothyroidism on levothyroxine    Hematologic/Immunology:  No diagnosis or significant findings on chart review or clinical presentation and evaluation.  The patient is not a Jehovah’s witness and will accept blood and blood products if medically indicated.   History of previous blood transfusions No  CBC-Pending  HGB-  Caprini Score 9, patient at Moderate for postoperative DVT. Pt supplied education/VTE handout  Anticoagulation use: Yes     Gastrointestinal:   No diagnosis or significant findings on chart review or clinical presentation and evaluation.   Recreational drug use: none  Alcohol use 1 glasses of wine per month    Infectious disease:   No diagnosis or significant findings on chart review or clinical presentation and evaluation.     Musculoskeletal:   Fibromyalgia  OA  JHFRAT score-10 points. moderate risk for falls  Morbid obesity BMI 42.56    Anesthesia:  ASA 3 - Patient with moderate  systemic disease with functional limitations  Anticipated anesthesia-general  History of General anesthesia- yes  Complications- No anesthesia complications  No family history of anesthesia complications    Labs & Imaging ordered:  CBC, CMP   Nickel/metal allergy-negative  Shellfish allergy-negative     Discussed with patient medication instructions, NPO guidelines, and any questions or concerns.   Will request clearance and Eliquis instructions from Cardiology Dr. Gillombardo    time spent 45 minutes  All resulted testing from PAT was forwarded to the surgeon and the patient's PCP if applicable.           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 06/27/2023    Hyperlipidemia     Hypertension     Other conditions influencing health status     Skin Abscess Of Toes    Pain in right axilla 03/27/2024    Personal history of COVID-19 06/15/2022    Personal history of other diseases of the musculoskeletal system and connective tissue     History of bursitis    Personal history of other diseases of the nervous system and sense organs     History of sciatica    Ulcer of ankle (Multi) 03/27/2024   [2]   Past Surgical History:  Procedure Laterality Date    CARDIAC CATHETERIZATION N/A 7/18/2025    Procedure: LAAO (Left Atrial Appendage Occlusion);  Surgeon: Elmer Hermosillo MD;  Location: [place] [id number] Cardiac Cath Lab;  Service: Cardiovascular;  Laterality: N/A;  Same day CT 1200    CYSTOSCOPY W/ URETERAL STENT PLACEMENT Left 06/24/2025    TOTAL KNEE ARTHROPLASTY  06/27/2013    Knee Replacement    US GUIDED THYROID BIOPSY  03/10/2014    US GUIDED THYROID BIOPSY 3/10/2014 [facility]   [3] No family history on  file.  [4] No Known Allergies

## 2025-08-11 ENCOUNTER — LAB (OUTPATIENT)
Dept: LAB | Facility: HOSPITAL | Age: 80
End: 2025-08-11
Payer: MEDICARE

## 2025-08-11 DIAGNOSIS — Z79.01 LONG TERM (CURRENT) USE OF ANTICOAGULANTS: ICD-10-CM

## 2025-08-11 DIAGNOSIS — N18.9 CHRONIC KIDNEY DISEASE, UNSPECIFIED: ICD-10-CM

## 2025-08-11 DIAGNOSIS — R39.9 UNSPECIFIED SYMPTOMS AND SIGNS INVOLVING THE GENITOURINARY SYSTEM: ICD-10-CM

## 2025-08-11 DIAGNOSIS — N20.0 CALCULUS OF KIDNEY: Primary | ICD-10-CM

## 2025-08-11 LAB
ALBUMIN SERPL BCP-MCNC: 4.5 G/DL (ref 3.4–5)
ALP SERPL-CCNC: 97 U/L (ref 33–136)
ALT SERPL W P-5'-P-CCNC: 26 U/L (ref 7–45)
ANION GAP SERPL CALC-SCNC: 21 MMOL/L (ref 10–20)
AST SERPL W P-5'-P-CCNC: 22 U/L (ref 9–39)
BILIRUB SERPL-MCNC: 0.5 MG/DL (ref 0–1.2)
BUN SERPL-MCNC: 33 MG/DL (ref 6–23)
CALCIUM SERPL-MCNC: 9.5 MG/DL (ref 8.6–10.6)
CHLORIDE SERPL-SCNC: 99 MMOL/L (ref 98–107)
CO2 SERPL-SCNC: 27 MMOL/L (ref 21–32)
CREAT SERPL-MCNC: 1.39 MG/DL (ref 0.5–1.05)
EGFRCR SERPLBLD CKD-EPI 2021: 38 ML/MIN/1.73M*2
ERYTHROCYTE [DISTWIDTH] IN BLOOD BY AUTOMATED COUNT: 14.2 % (ref 11.5–14.5)
GLUCOSE SERPL-MCNC: 68 MG/DL (ref 74–99)
HCT VFR BLD AUTO: 43.9 % (ref 36–46)
HGB BLD-MCNC: 13 G/DL (ref 12–16)
MCH RBC QN AUTO: 28.8 PG (ref 26–34)
MCHC RBC AUTO-ENTMCNC: 29.6 G/DL (ref 32–36)
MCV RBC AUTO: 97 FL (ref 80–100)
NRBC BLD-RTO: 0 /100 WBCS (ref 0–0)
PLATELET # BLD AUTO: 266 X10*3/UL (ref 150–450)
POTASSIUM SERPL-SCNC: 4.2 MMOL/L (ref 3.5–5.3)
PROT SERPL-MCNC: 7.4 G/DL (ref 6.4–8.2)
RBC # BLD AUTO: 4.52 X10*6/UL (ref 4–5.2)
SODIUM SERPL-SCNC: 143 MMOL/L (ref 136–145)
WBC # BLD AUTO: 5.2 X10*3/UL (ref 4.4–11.3)

## 2025-08-11 PROCEDURE — 87086 URINE CULTURE/COLONY COUNT: CPT

## 2025-08-11 PROCEDURE — 36415 COLL VENOUS BLD VENIPUNCTURE: CPT

## 2025-08-11 PROCEDURE — 85027 COMPLETE CBC AUTOMATED: CPT

## 2025-08-11 PROCEDURE — 80053 COMPREHEN METABOLIC PANEL: CPT

## 2025-08-12 ENCOUNTER — TELEPHONE (OUTPATIENT)
Dept: UROLOGY | Facility: CLINIC | Age: 80
End: 2025-08-12
Payer: MEDICARE

## 2025-08-12 DIAGNOSIS — N20.0 KIDNEY STONES: ICD-10-CM

## 2025-08-13 LAB — BACTERIA UR CULT: NORMAL

## 2025-08-15 DIAGNOSIS — E78.49 OTHER HYPERLIPIDEMIA: ICD-10-CM

## 2025-08-15 RX ORDER — ATORVASTATIN CALCIUM 40 MG/1
40 TABLET, FILM COATED ORAL DAILY
Qty: 90 TABLET | Refills: 3 | Status: SHIPPED | OUTPATIENT
Start: 2025-08-15 | End: 2026-08-15

## 2025-08-16 LAB — BACTERIA UR CULT: NORMAL

## 2025-08-19 DIAGNOSIS — E78.49 OTHER HYPERLIPIDEMIA: ICD-10-CM

## 2025-08-19 RX ORDER — ATORVASTATIN CALCIUM 40 MG/1
40 TABLET, FILM COATED ORAL DAILY
Qty: 90 TABLET | Refills: 3 | Status: SHIPPED | OUTPATIENT
Start: 2025-08-19 | End: 2026-08-19

## 2025-08-22 ENCOUNTER — ANESTHESIA (OUTPATIENT)
Dept: OPERATING ROOM | Facility: HOSPITAL | Age: 80
End: 2025-08-22
Payer: MEDICARE

## 2025-08-22 ENCOUNTER — APPOINTMENT (OUTPATIENT)
Dept: RADIOLOGY | Facility: HOSPITAL | Age: 80
End: 2025-08-22
Payer: MEDICARE

## 2025-08-22 ENCOUNTER — ANESTHESIA EVENT (OUTPATIENT)
Dept: OPERATING ROOM | Facility: HOSPITAL | Age: 80
End: 2025-08-22
Payer: MEDICARE

## 2025-08-22 ENCOUNTER — HOSPITAL ENCOUNTER (OUTPATIENT)
Facility: HOSPITAL | Age: 80
Setting detail: OUTPATIENT SURGERY
Discharge: HOME | End: 2025-08-22
Payer: MEDICARE

## 2025-08-22 VITALS
WEIGHT: 260.8 LBS | DIASTOLIC BLOOD PRESSURE: 74 MMHG | TEMPERATURE: 97.7 F | RESPIRATION RATE: 16 BRPM | SYSTOLIC BLOOD PRESSURE: 126 MMHG | BODY MASS INDEX: 43.45 KG/M2 | HEART RATE: 91 BPM | OXYGEN SATURATION: 92 % | HEIGHT: 65 IN

## 2025-08-22 DIAGNOSIS — Z00.6 ENCOUNTER FOR EXAMINATION FOR NORMAL COMPARISON AND CONTROL IN CLINICAL RESEARCH PROGRAM: ICD-10-CM

## 2025-08-22 DIAGNOSIS — Z79.01 ANTICOAGULANT LONG-TERM USE: ICD-10-CM

## 2025-08-22 DIAGNOSIS — N20.0 KIDNEY STONE ON LEFT SIDE: Primary | ICD-10-CM

## 2025-08-22 PROCEDURE — 2500000004 HC RX 250 GENERAL PHARMACY W/ HCPCS (ALT 636 FOR OP/ED)

## 2025-08-22 PROCEDURE — C2617 STENT, NON-COR, TEM W/O DEL: HCPCS

## 2025-08-22 PROCEDURE — 76000 FLUOROSCOPY <1 HR PHYS/QHP: CPT

## 2025-08-22 PROCEDURE — 2500000004 HC RX 250 GENERAL PHARMACY W/ HCPCS (ALT 636 FOR OP/ED): Performed by: ANESTHESIOLOGIST ASSISTANT

## 2025-08-22 PROCEDURE — 52356 CYSTO/URETERO W/LITHOTRIPSY: CPT

## 2025-08-22 PROCEDURE — 7100000002 HC RECOVERY ROOM TIME - EACH INCREMENTAL 1 MINUTE

## 2025-08-22 PROCEDURE — 7100000001 HC RECOVERY ROOM TIME - INITIAL BASE CHARGE

## 2025-08-22 PROCEDURE — 3600000004 HC OR TIME - INITIAL BASE CHARGE - PROCEDURE LEVEL FOUR

## 2025-08-22 PROCEDURE — 7100000010 HC PHASE TWO TIME - EACH INCREMENTAL 1 MINUTE

## 2025-08-22 PROCEDURE — 3700000002 HC GENERAL ANESTHESIA TIME - EACH INCREMENTAL 1 MINUTE

## 2025-08-22 PROCEDURE — 7100000009 HC PHASE TWO TIME - INITIAL BASE CHARGE

## 2025-08-22 PROCEDURE — 3700000001 HC GENERAL ANESTHESIA TIME - INITIAL BASE CHARGE

## 2025-08-22 PROCEDURE — 2780000003 HC OR 278 NO HCPCS

## 2025-08-22 PROCEDURE — C1894 INTRO/SHEATH, NON-LASER: HCPCS

## 2025-08-22 PROCEDURE — C1758 CATHETER, URETERAL: HCPCS

## 2025-08-22 PROCEDURE — 82365 CALCULUS SPECTROSCOPY: CPT

## 2025-08-22 PROCEDURE — 2720000007 HC OR 272 NO HCPCS

## 2025-08-22 PROCEDURE — 74420 UROGRAPHY RTRGR +-KUB: CPT

## 2025-08-22 PROCEDURE — 3600000009 HC OR TIME - EACH INCREMENTAL 1 MINUTE - PROCEDURE LEVEL FOUR

## 2025-08-22 DEVICE — STENT, TRIA URETHERAL, SOFT, 6 X  26: Type: IMPLANTABLE DEVICE | Site: URETER | Status: FUNCTIONAL

## 2025-08-22 RX ORDER — LABETALOL HYDROCHLORIDE 5 MG/ML
5 INJECTION, SOLUTION INTRAVENOUS ONCE AS NEEDED
Status: DISCONTINUED | OUTPATIENT
Start: 2025-08-22 | End: 2025-08-22 | Stop reason: HOSPADM

## 2025-08-22 RX ORDER — ALBUTEROL SULFATE 0.83 MG/ML
2.5 SOLUTION RESPIRATORY (INHALATION) ONCE AS NEEDED
Status: DISCONTINUED | OUTPATIENT
Start: 2025-08-22 | End: 2025-08-22 | Stop reason: HOSPADM

## 2025-08-22 RX ORDER — LIDOCAINE HYDROCHLORIDE 10 MG/ML
INJECTION, SOLUTION EPIDURAL; INFILTRATION; INTRACAUDAL; PERINEURAL AS NEEDED
Status: DISCONTINUED | OUTPATIENT
Start: 2025-08-22 | End: 2025-08-22

## 2025-08-22 RX ORDER — OXYCODONE AND ACETAMINOPHEN 5; 325 MG/1; MG/1
1 TABLET ORAL EVERY 4 HOURS PRN
Status: DISCONTINUED | OUTPATIENT
Start: 2025-08-22 | End: 2025-08-22 | Stop reason: HOSPADM

## 2025-08-22 RX ORDER — SODIUM CHLORIDE, SODIUM LACTATE, POTASSIUM CHLORIDE, CALCIUM CHLORIDE 600; 310; 30; 20 MG/100ML; MG/100ML; MG/100ML; MG/100ML
50 INJECTION, SOLUTION INTRAVENOUS CONTINUOUS
Status: DISCONTINUED | OUTPATIENT
Start: 2025-08-22 | End: 2025-08-22 | Stop reason: HOSPADM

## 2025-08-22 RX ORDER — FENTANYL CITRATE 50 UG/ML
INJECTION, SOLUTION INTRAMUSCULAR; INTRAVENOUS AS NEEDED
Status: DISCONTINUED | OUTPATIENT
Start: 2025-08-22 | End: 2025-08-22

## 2025-08-22 RX ORDER — PHENAZOPYRIDINE HYDROCHLORIDE 200 MG/1
200 TABLET, FILM COATED ORAL 3 TIMES DAILY
Qty: 9 TABLET | Refills: 0 | Status: SHIPPED | OUTPATIENT
Start: 2025-08-22 | End: 2025-08-29 | Stop reason: ALTCHOICE

## 2025-08-22 RX ORDER — CEFAZOLIN SODIUM 2 G/100ML
2 INJECTION, SOLUTION INTRAVENOUS ONCE
Status: COMPLETED | OUTPATIENT
Start: 2025-08-22 | End: 2025-08-22

## 2025-08-22 RX ORDER — SODIUM CHLORIDE, SODIUM LACTATE, POTASSIUM CHLORIDE, CALCIUM CHLORIDE 600; 310; 30; 20 MG/100ML; MG/100ML; MG/100ML; MG/100ML
INJECTION, SOLUTION INTRAVENOUS CONTINUOUS PRN
Status: DISCONTINUED | OUTPATIENT
Start: 2025-08-22 | End: 2025-08-22

## 2025-08-22 RX ORDER — ONDANSETRON HYDROCHLORIDE 2 MG/ML
4 INJECTION, SOLUTION INTRAVENOUS ONCE AS NEEDED
Status: DISCONTINUED | OUTPATIENT
Start: 2025-08-22 | End: 2025-08-22 | Stop reason: HOSPADM

## 2025-08-22 RX ORDER — HYDROMORPHONE HYDROCHLORIDE 0.2 MG/ML
0.2 INJECTION INTRAMUSCULAR; INTRAVENOUS; SUBCUTANEOUS EVERY 5 MIN PRN
Status: DISCONTINUED | OUTPATIENT
Start: 2025-08-22 | End: 2025-08-22 | Stop reason: HOSPADM

## 2025-08-22 RX ORDER — POTASSIUM CITRATE 1080 MG/1
10 TABLET, EXTENDED RELEASE ORAL
Qty: 90 TABLET | Refills: 0 | Status: SHIPPED | OUTPATIENT
Start: 2025-08-22 | End: 2025-09-21

## 2025-08-22 RX ORDER — CIPROFLOXACIN 500 MG/1
500 TABLET, FILM COATED ORAL 2 TIMES DAILY
Qty: 10 TABLET | Refills: 0 | Status: SHIPPED | OUTPATIENT
Start: 2025-08-22 | End: 2025-08-29 | Stop reason: ALTCHOICE

## 2025-08-22 RX ORDER — TAMSULOSIN HYDROCHLORIDE 0.4 MG/1
0.4 CAPSULE ORAL DAILY
Qty: 30 CAPSULE | Refills: 0 | Status: SHIPPED | OUTPATIENT
Start: 2025-08-22 | End: 2025-08-29 | Stop reason: WASHOUT

## 2025-08-22 RX ORDER — DROPERIDOL 2.5 MG/ML
0.62 INJECTION, SOLUTION INTRAMUSCULAR; INTRAVENOUS ONCE AS NEEDED
Status: DISCONTINUED | OUTPATIENT
Start: 2025-08-22 | End: 2025-08-22 | Stop reason: HOSPADM

## 2025-08-22 RX ORDER — FENTANYL CITRATE 50 UG/ML
12.5 INJECTION, SOLUTION INTRAMUSCULAR; INTRAVENOUS EVERY 5 MIN PRN
Status: DISCONTINUED | OUTPATIENT
Start: 2025-08-22 | End: 2025-08-22 | Stop reason: HOSPADM

## 2025-08-22 RX ORDER — LIDOCAINE HYDROCHLORIDE 10 MG/ML
0.1 INJECTION, SOLUTION EPIDURAL; INFILTRATION; INTRACAUDAL; PERINEURAL ONCE
Status: DISCONTINUED | OUTPATIENT
Start: 2025-08-22 | End: 2025-08-22 | Stop reason: HOSPADM

## 2025-08-22 RX ORDER — PROPOFOL 10 MG/ML
INJECTION, EMULSION INTRAVENOUS AS NEEDED
Status: DISCONTINUED | OUTPATIENT
Start: 2025-08-22 | End: 2025-08-22

## 2025-08-22 RX ORDER — ACETAMINOPHEN 500 MG
1000 TABLET ORAL EVERY 6 HOURS PRN
Qty: 30 TABLET | Refills: 0 | Status: SHIPPED | OUTPATIENT
Start: 2025-08-22

## 2025-08-22 RX ORDER — ONDANSETRON HYDROCHLORIDE 2 MG/ML
INJECTION, SOLUTION INTRAVENOUS AS NEEDED
Status: DISCONTINUED | OUTPATIENT
Start: 2025-08-22 | End: 2025-08-22

## 2025-08-22 RX ADMIN — SODIUM CHLORIDE, POTASSIUM CHLORIDE, SODIUM LACTATE AND CALCIUM CHLORIDE: 600; 310; 30; 20 INJECTION, SOLUTION INTRAVENOUS at 08:25

## 2025-08-22 RX ADMIN — DEXAMETHASONE SODIUM PHOSPHATE 4 MG: 4 INJECTION INTRA-ARTICULAR; INTRALESIONAL; INTRAMUSCULAR; INTRAVENOUS; SOFT TISSUE at 09:11

## 2025-08-22 RX ADMIN — CEFAZOLIN SODIUM 2 G: 2 INJECTION, SOLUTION INTRAVENOUS at 08:36

## 2025-08-22 RX ADMIN — FENTANYL CITRATE 50 MCG: 0.05 INJECTION, SOLUTION INTRAMUSCULAR; INTRAVENOUS at 09:44

## 2025-08-22 RX ADMIN — FENTANYL CITRATE 25 MCG: 0.05 INJECTION, SOLUTION INTRAMUSCULAR; INTRAVENOUS at 08:45

## 2025-08-22 RX ADMIN — ONDANSETRON 4 MG: 2 INJECTION, SOLUTION INTRAMUSCULAR; INTRAVENOUS at 09:09

## 2025-08-22 RX ADMIN — PROPOFOL 200 MG: 10 INJECTION, EMULSION INTRAVENOUS at 08:36

## 2025-08-22 RX ADMIN — LIDOCAINE HYDROCHLORIDE 5 ML: 10 INJECTION, SOLUTION EPIDURAL; INFILTRATION; INTRACAUDAL; PERINEURAL at 08:36

## 2025-08-22 RX ADMIN — FENTANYL CITRATE 50 MCG: 0.05 INJECTION, SOLUTION INTRAMUSCULAR; INTRAVENOUS at 08:35

## 2025-08-22 RX ADMIN — FENTANYL CITRATE 25 MCG: 0.05 INJECTION, SOLUTION INTRAMUSCULAR; INTRAVENOUS at 09:09

## 2025-08-22 ASSESSMENT — PAIN SCALES - GENERAL
PAINLEVEL_OUTOF10: 0 - NO PAIN

## 2025-08-22 ASSESSMENT — PAIN - FUNCTIONAL ASSESSMENT
PAIN_FUNCTIONAL_ASSESSMENT: 0-10

## 2025-08-29 ENCOUNTER — OFFICE VISIT (OUTPATIENT)
Dept: CARDIOLOGY | Facility: HOSPITAL | Age: 80
End: 2025-08-29
Payer: MEDICARE

## 2025-08-29 LAB
APPEARANCE STONE: NORMAL
COMPN STONE: NORMAL
SPECIMEN WT: 545 MG

## 2025-08-29 PROCEDURE — 93010 ELECTROCARDIOGRAM REPORT: CPT | Performed by: INTERNAL MEDICINE

## 2025-09-02 ENCOUNTER — HOSPITAL ENCOUNTER (OUTPATIENT)
Dept: CARDIOLOGY | Facility: HOSPITAL | Age: 80
Discharge: HOME | End: 2025-09-02
Payer: MEDICARE

## 2025-09-02 DIAGNOSIS — I48.0 PAROXYSMAL ATRIAL FIBRILLATION (MULTI): ICD-10-CM

## 2025-09-02 PROCEDURE — 93005 ELECTROCARDIOGRAM TRACING: CPT

## 2025-09-02 RX ORDER — AMIODARONE HYDROCHLORIDE 200 MG/1
TABLET ORAL
Qty: 9 TABLET | Refills: 3 | Status: SHIPPED | OUTPATIENT
Start: 2025-09-02 | End: 2025-09-02 | Stop reason: SDUPTHER

## 2025-09-03 RX ORDER — AMIODARONE HYDROCHLORIDE 200 MG/1
TABLET ORAL
Qty: 90 TABLET | Refills: 3 | Status: SHIPPED | OUTPATIENT
Start: 2025-09-03

## 2025-09-05 ENCOUNTER — APPOINTMENT (OUTPATIENT)
Dept: UROLOGY | Facility: CLINIC | Age: 80
End: 2025-09-05
Payer: MEDICARE

## 2025-09-05 ASSESSMENT — PAIN SCALES - GENERAL: PAINLEVEL_OUTOF10: 0-NO PAIN

## 2025-09-22 ENCOUNTER — APPOINTMENT (OUTPATIENT)
Dept: UROLOGY | Facility: CLINIC | Age: 80
End: 2025-09-22
Payer: MEDICARE

## 2025-09-29 ENCOUNTER — APPOINTMENT (OUTPATIENT)
Dept: UROLOGY | Facility: HOSPITAL | Age: 80
End: 2025-09-29
Payer: MEDICARE

## (undated) DEVICE — SYRINGE, 50 CC, LUER LOCK

## (undated) DEVICE — STONE COLLECTION BOTTLE

## (undated) DEVICE — SHEATH, URETERAL ACCESS, NAVIGATOR 11/13FR X 36CM

## (undated) DEVICE — GLOVES, SURG BIOGEL, SZ-7.0, PF, LF

## (undated) DEVICE — SHEATH, PINNACLE, 10 CM,  8FR INTRODUCER, 8FR DIA, 2.5 CM DIALATOR

## (undated) DEVICE — CATHETER, ACUSON ACUNAV ULTRASOUND, 8FR 90CM

## (undated) DEVICE — COLLECTION BAG, FLUID, F/STERIS LOOP, STERILE

## (undated) DEVICE — Device

## (undated) DEVICE — ACCESS KIT, S-MAK MINI, 4FR 10CM 0.018IN 40CM, NT/PT, ECHO ENHANCE NEEDLE

## (undated) DEVICE — TUBING, SUCTION, NON-CONDUCTIVE, W/CONNECT,.25 IN X 12 FT, STERILE, LF

## (undated) DEVICE — SYSTEM, ACCESS, FXD DBL CURVE, WATCHMAN

## (undated) DEVICE — GLOVE, SURGICAL, PROTEXIS PI ORTHO, 8.5, PF, LF

## (undated) DEVICE — GUIDEWIRE, INQWIRE, 3MM J, .035, 150

## (undated) DEVICE — SHEATH, PINNACLE, W/O GUIDEWIRE, 8FR INTRODUCER,  8FR DIA, 2.5 CM DILATOR

## (undated) DEVICE — SHEATH, PINNACLE, 10 CM,  7FR INTRODUCER, 7FR DIA, 2.5 CM DIALATOR

## (undated) DEVICE — CATHETER, ANGIO, IMPULSE, PIG 155, 6 FR X 110 CM

## (undated) DEVICE — PAD, ELECTRODE DEFIB PADPRO ADULT STRL W/ADAPTER

## (undated) DEVICE — COLLECTION BAG, FLUID, NON-STERILE

## (undated) DEVICE — CATHETER, URETERAL, OPEN END, 5 FR, 70 CM

## (undated) DEVICE — CATHETER, DUAL LUMEN, UDC/10/50 M

## (undated) DEVICE — TOWEL, SURGICAL, NEURO, O/R, 16 X 26, BLUE, STERILE

## (undated) DEVICE — GUIDEWIRE, DUAL SENSOR, .035 X 150 STRAIGHT,  3CM

## (undated) DEVICE — DEVICE, CLOSURE, PERCLOSE, PROSTYLE